# Patient Record
Sex: MALE | Race: WHITE | NOT HISPANIC OR LATINO | Employment: FULL TIME | URBAN - METROPOLITAN AREA
[De-identification: names, ages, dates, MRNs, and addresses within clinical notes are randomized per-mention and may not be internally consistent; named-entity substitution may affect disease eponyms.]

---

## 2018-04-05 ENCOUNTER — HOSPITAL ENCOUNTER (OUTPATIENT)
Facility: MEDICAL CENTER | Age: 23
End: 2018-04-05
Attending: PREVENTIVE MEDICINE
Payer: COMMERCIAL

## 2018-04-05 ENCOUNTER — EMPLOYEE HEALTH (OUTPATIENT)
Dept: OCCUPATIONAL MEDICINE | Facility: CLINIC | Age: 23
End: 2018-04-05

## 2018-04-05 ENCOUNTER — EH NON-PROVIDER (OUTPATIENT)
Dept: OCCUPATIONAL MEDICINE | Facility: CLINIC | Age: 23
End: 2018-04-05

## 2018-04-05 DIAGNOSIS — Z02.1 PRE-EMPLOYMENT DRUG SCREENING: ICD-10-CM

## 2018-04-05 DIAGNOSIS — Z02.1 PRE-EMPLOYMENT DRUG SCREENING: Primary | ICD-10-CM

## 2018-04-05 DIAGNOSIS — Z02.1 PRE-EMPLOYMENT EXAMINATION: ICD-10-CM

## 2018-04-05 DIAGNOSIS — Z02.89 VISIT FOR OCCUPATIONAL HEALTH EXAMINATION: ICD-10-CM

## 2018-04-05 LAB
AMP AMPHETAMINE: NORMAL
BAR BARBITURATES: NORMAL
BZO BENZODIAZEPINES: NORMAL
COC COCAINE: NORMAL
INT CON NEG: NORMAL
INT CON POS: NORMAL
MDMA ECSTASY: NORMAL
MET METHAMPHETAMINES: NORMAL
MTD METHADONE: NORMAL
OPI OPIATES: NORMAL
OXY OXYCODONE: NORMAL
PCP PHENCYCLIDINE: NORMAL
POC URINE DRUG SCREEN OCDRS: NEGATIVE
RUBV AB SER QL: 5.5 IU/ML
THC: NORMAL

## 2018-04-05 PROCEDURE — 86480 TB TEST CELL IMMUN MEASURE: CPT | Performed by: PREVENTIVE MEDICINE

## 2018-04-05 PROCEDURE — 8915 PR COMPREHENSIVE PHYSICAL: Performed by: PREVENTIVE MEDICINE

## 2018-04-05 PROCEDURE — 90715 TDAP VACCINE 7 YRS/> IM: CPT | Performed by: PREVENTIVE MEDICINE

## 2018-04-05 PROCEDURE — 94375 RESPIRATORY FLOW VOLUME LOOP: CPT | Performed by: PREVENTIVE MEDICINE

## 2018-04-05 PROCEDURE — 86735 MUMPS ANTIBODY: CPT | Performed by: PREVENTIVE MEDICINE

## 2018-04-05 PROCEDURE — 90716 VAR VACCINE LIVE SUBQ: CPT | Performed by: PREVENTIVE MEDICINE

## 2018-04-05 PROCEDURE — 86765 RUBEOLA ANTIBODY: CPT | Performed by: PREVENTIVE MEDICINE

## 2018-04-05 PROCEDURE — 90686 IIV4 VACC NO PRSV 0.5 ML IM: CPT | Performed by: PREVENTIVE MEDICINE

## 2018-04-05 PROCEDURE — 80305 DRUG TEST PRSMV DIR OPT OBS: CPT | Performed by: PREVENTIVE MEDICINE

## 2018-04-05 PROCEDURE — 86762 RUBELLA ANTIBODY: CPT | Performed by: PREVENTIVE MEDICINE

## 2018-04-06 LAB
MEV IGG SER IA-ACNC: NEGATIVE
MUV IGG SER IA-ACNC: POSITIVE

## 2018-04-09 LAB
M TB TUBERC IFN-G BLD QL: NEGATIVE
M TB TUBERC IFN-G/MITOGEN IGNF BLD: -0.02
M TB TUBERC IGNF/MITOGEN IGNF CONTROL: 52.23 [IU]/ML
MITOGEN IGNF BCKGRD COR BLD-ACNC: 0.06 [IU]/ML

## 2018-09-19 ENCOUNTER — DOCUMENTATION (OUTPATIENT)
Dept: OCCUPATIONAL MEDICINE | Facility: CLINIC | Age: 23
End: 2018-09-19

## 2018-09-20 NOTE — PROGRESS NOTES
Pre-employment medical surveillance reviewed. Rubella IgG AB result equivocal and Rubeola IgG AB result negative. MMR vaccine series required.   Employee to be contacted for appointment. MA informed. Quantiferon result documented in immunizations.  See scanned documents.     Clearance pending.

## 2018-09-22 ENCOUNTER — TELEPHONE (OUTPATIENT)
Dept: OCCUPATIONAL MEDICINE | Facility: CLINIC | Age: 23
End: 2018-09-22

## 2018-09-22 NOTE — TELEPHONE ENCOUNTER
Phone Number Called: [phone] (home)       Message: patient needs to get MMR vaccines.     Left Message for patient to call back: yes

## 2018-09-24 ENCOUNTER — IMMUNIZATION (OUTPATIENT)
Dept: OCCUPATIONAL MEDICINE | Facility: CLINIC | Age: 23
End: 2018-09-24
Payer: COMMERCIAL

## 2018-09-24 DIAGNOSIS — Z23 NEED FOR VACCINATION: ICD-10-CM

## 2018-09-24 PROCEDURE — 90686 IIV4 VACC NO PRSV 0.5 ML IM: CPT | Performed by: PREVENTIVE MEDICINE

## 2018-10-11 ENCOUNTER — HOSPITAL ENCOUNTER (EMERGENCY)
Facility: MEDICAL CENTER | Age: 23
End: 2018-10-11
Attending: EMERGENCY MEDICINE
Payer: COMMERCIAL

## 2018-10-11 ENCOUNTER — APPOINTMENT (OUTPATIENT)
Dept: RADIOLOGY | Facility: MEDICAL CENTER | Age: 23
End: 2018-10-11
Attending: EMERGENCY MEDICINE
Payer: COMMERCIAL

## 2018-10-11 VITALS
SYSTOLIC BLOOD PRESSURE: 110 MMHG | WEIGHT: 192 LBS | HEIGHT: 68 IN | TEMPERATURE: 97.7 F | HEART RATE: 83 BPM | BODY MASS INDEX: 29.1 KG/M2 | OXYGEN SATURATION: 98 % | RESPIRATION RATE: 17 BRPM | DIASTOLIC BLOOD PRESSURE: 79 MMHG

## 2018-10-11 DIAGNOSIS — N20.1 URETERAL STONE: ICD-10-CM

## 2018-10-11 LAB
ALBUMIN SERPL BCP-MCNC: 4.5 G/DL (ref 3.2–4.9)
ALBUMIN/GLOB SERPL: 1.7 G/DL
ALP SERPL-CCNC: 59 U/L (ref 30–99)
ALT SERPL-CCNC: 11 U/L (ref 2–50)
ANION GAP SERPL CALC-SCNC: 11 MMOL/L (ref 0–11.9)
APPEARANCE UR: ABNORMAL
AST SERPL-CCNC: 17 U/L (ref 12–45)
BACTERIA #/AREA URNS HPF: NEGATIVE /HPF
BASOPHILS # BLD AUTO: 0.2 % (ref 0–1.8)
BASOPHILS # BLD: 0.02 K/UL (ref 0–0.12)
BILIRUB SERPL-MCNC: 0.8 MG/DL (ref 0.1–1.5)
BILIRUB UR QL STRIP.AUTO: NEGATIVE
BUN SERPL-MCNC: 12 MG/DL (ref 8–22)
CALCIUM SERPL-MCNC: 9 MG/DL (ref 8.5–10.5)
CHLORIDE SERPL-SCNC: 107 MMOL/L (ref 96–112)
CO2 SERPL-SCNC: 20 MMOL/L (ref 20–33)
COLOR UR: ABNORMAL
CREAT SERPL-MCNC: 1.14 MG/DL (ref 0.5–1.4)
EOSINOPHIL # BLD AUTO: 0.03 K/UL (ref 0–0.51)
EOSINOPHIL NFR BLD: 0.3 % (ref 0–6.9)
EPI CELLS #/AREA URNS HPF: NEGATIVE /HPF
ERYTHROCYTE [DISTWIDTH] IN BLOOD BY AUTOMATED COUNT: 40 FL (ref 35.9–50)
GLOBULIN SER CALC-MCNC: 2.7 G/DL (ref 1.9–3.5)
GLUCOSE SERPL-MCNC: 128 MG/DL (ref 65–99)
GLUCOSE UR STRIP.AUTO-MCNC: NEGATIVE MG/DL
HCT VFR BLD AUTO: 48.5 % (ref 42–52)
HGB BLD-MCNC: 16.7 G/DL (ref 14–18)
HYALINE CASTS #/AREA URNS LPF: ABNORMAL /LPF
IMM GRANULOCYTES # BLD AUTO: 0.18 K/UL (ref 0–0.11)
IMM GRANULOCYTES NFR BLD AUTO: 1.9 % (ref 0–0.9)
KETONES UR STRIP.AUTO-MCNC: ABNORMAL MG/DL
LEUKOCYTE ESTERASE UR QL STRIP.AUTO: NEGATIVE
LIPASE SERPL-CCNC: 21 U/L (ref 11–82)
LYMPHOCYTES # BLD AUTO: 1.18 K/UL (ref 1–4.8)
LYMPHOCYTES NFR BLD: 12.7 % (ref 22–41)
MCH RBC QN AUTO: 30.6 PG (ref 27–33)
MCHC RBC AUTO-ENTMCNC: 34.4 G/DL (ref 33.7–35.3)
MCV RBC AUTO: 88.8 FL (ref 81.4–97.8)
MICRO URNS: ABNORMAL
MONOCYTES # BLD AUTO: 0.46 K/UL (ref 0–0.85)
MONOCYTES NFR BLD AUTO: 5 % (ref 0–13.4)
NEUTROPHILS # BLD AUTO: 7.39 K/UL (ref 1.82–7.42)
NEUTROPHILS NFR BLD: 79.9 % (ref 44–72)
NITRITE UR QL STRIP.AUTO: NEGATIVE
NRBC # BLD AUTO: 0 K/UL
NRBC BLD-RTO: 0 /100 WBC
PH UR STRIP.AUTO: 5 [PH]
PLATELET # BLD AUTO: 288 K/UL (ref 164–446)
PMV BLD AUTO: 10 FL (ref 9–12.9)
POTASSIUM SERPL-SCNC: 3.9 MMOL/L (ref 3.6–5.5)
PROT SERPL-MCNC: 7.2 G/DL (ref 6–8.2)
PROT UR QL STRIP: 30 MG/DL
RBC # BLD AUTO: 5.46 M/UL (ref 4.7–6.1)
RBC # URNS HPF: ABNORMAL /HPF
RBC UR QL AUTO: ABNORMAL
SODIUM SERPL-SCNC: 138 MMOL/L (ref 135–145)
SP GR UR STRIP.AUTO: 1.02
UROBILINOGEN UR STRIP.AUTO-MCNC: 0.2 MG/DL
WBC # BLD AUTO: 9.3 K/UL (ref 4.8–10.8)
WBC #/AREA URNS HPF: ABNORMAL /HPF

## 2018-10-11 PROCEDURE — 85025 COMPLETE CBC W/AUTO DIFF WBC: CPT

## 2018-10-11 PROCEDURE — 83690 ASSAY OF LIPASE: CPT

## 2018-10-11 PROCEDURE — 80053 COMPREHEN METABOLIC PANEL: CPT

## 2018-10-11 PROCEDURE — 99285 EMERGENCY DEPT VISIT HI MDM: CPT

## 2018-10-11 PROCEDURE — 700105 HCHG RX REV CODE 258: Performed by: EMERGENCY MEDICINE

## 2018-10-11 PROCEDURE — 96375 TX/PRO/DX INJ NEW DRUG ADDON: CPT

## 2018-10-11 PROCEDURE — 96374 THER/PROPH/DIAG INJ IV PUSH: CPT

## 2018-10-11 PROCEDURE — 700111 HCHG RX REV CODE 636 W/ 250 OVERRIDE (IP): Performed by: EMERGENCY MEDICINE

## 2018-10-11 PROCEDURE — 74176 CT ABD & PELVIS W/O CONTRAST: CPT

## 2018-10-11 PROCEDURE — 87086 URINE CULTURE/COLONY COUNT: CPT

## 2018-10-11 PROCEDURE — 81001 URINALYSIS AUTO W/SCOPE: CPT

## 2018-10-11 PROCEDURE — 36415 COLL VENOUS BLD VENIPUNCTURE: CPT

## 2018-10-11 RX ORDER — KETOROLAC TROMETHAMINE 30 MG/ML
30 INJECTION, SOLUTION INTRAMUSCULAR; INTRAVENOUS ONCE
Status: COMPLETED | OUTPATIENT
Start: 2018-10-11 | End: 2018-10-11

## 2018-10-11 RX ORDER — ONDANSETRON 2 MG/ML
4 INJECTION INTRAMUSCULAR; INTRAVENOUS ONCE
Status: COMPLETED | OUTPATIENT
Start: 2018-10-11 | End: 2018-10-11

## 2018-10-11 RX ORDER — SODIUM CHLORIDE, SODIUM LACTATE, POTASSIUM CHLORIDE, CALCIUM CHLORIDE 600; 310; 30; 20 MG/100ML; MG/100ML; MG/100ML; MG/100ML
1000 INJECTION, SOLUTION INTRAVENOUS ONCE
Status: COMPLETED | OUTPATIENT
Start: 2018-10-11 | End: 2018-10-11

## 2018-10-11 RX ORDER — HYDROCODONE BITARTRATE AND ACETAMINOPHEN 5; 325 MG/1; MG/1
1-2 TABLET ORAL EVERY 6 HOURS PRN
Qty: 15 TAB | Refills: 0 | Status: SHIPPED | OUTPATIENT
Start: 2018-10-11 | End: 2018-10-14

## 2018-10-11 RX ORDER — TAMSULOSIN HYDROCHLORIDE 0.4 MG/1
0.4 CAPSULE ORAL DAILY
Qty: 7 CAP | Refills: 0 | Status: SHIPPED | OUTPATIENT
Start: 2018-10-11 | End: 2020-05-14

## 2018-10-11 RX ORDER — ONDANSETRON 4 MG/1
4 TABLET, ORALLY DISINTEGRATING ORAL EVERY 6 HOURS PRN
Qty: 20 TAB | Refills: 0 | Status: SHIPPED | OUTPATIENT
Start: 2018-10-11 | End: 2020-05-14

## 2018-10-11 RX ADMIN — KETOROLAC TROMETHAMINE 30 MG: 30 INJECTION, SOLUTION INTRAMUSCULAR at 18:59

## 2018-10-11 RX ADMIN — SODIUM CHLORIDE, POTASSIUM CHLORIDE, SODIUM LACTATE AND CALCIUM CHLORIDE 1000 ML: 600; 310; 30; 20 INJECTION, SOLUTION INTRAVENOUS at 18:59

## 2018-10-11 RX ADMIN — ONDANSETRON 4 MG: 2 INJECTION INTRAMUSCULAR; INTRAVENOUS at 18:59

## 2018-10-11 ASSESSMENT — PAIN SCALES - GENERAL
PAINLEVEL_OUTOF10: 7
PAINLEVEL_OUTOF10: 7

## 2018-10-11 ASSESSMENT — PAIN DESCRIPTION - DESCRIPTORS: DESCRIPTORS: SHARP

## 2018-10-12 NOTE — ED PROVIDER NOTES
ED Provider Note      ER PROVIDER NOTE        CHIEF COMPLAINT  Chief Complaint   Patient presents with   • Flank Pain     Pt BIB EMS/ symptoms since 1500 today after waking/ hx of kidney stone/ ODT Zofran PTA   • Back Pain   • Nausea       HPI  Jayden Nielsen is a 23 y.o. male who presents to the emergency department complaining of right-sided flank pain.  Patient reports that today around 3 when he woke up he had acute onset of right flank pain, colicky, sharp, some radiation to the front, feels like his past kidney stone.  He denies any other radiation, no alleviating or aggravated factors.  He denies any dysuria or hematuria.  He has had some nausea and dry heaves from the pain as well.  He denies any other abdominal pain    REVIEW OF SYSTEMS  Pertinent positives include flank pain. Pertinent negatives include no fever or chills. See HPI for details. All other systems reviewed and are negative.    PAST MEDICAL HISTORY   has a past medical history of Kidney stone and Kidney stone.    SURGICAL HISTORY  patient denies any surgical history    FAMILY HISTORY  History reviewed. No pertinent family history.    SOCIAL HISTORY  Social History     Social History   • Marital status: Single     Spouse name: N/A   • Number of children: N/A   • Years of education: N/A     Social History Main Topics   • Smoking status: Never Smoker   • Smokeless tobacco: Never Used   • Alcohol use No   • Drug use: No   • Sexual activity: Not on file     Other Topics Concern   • Not on file     Social History Narrative   • No narrative on file      History   Drug Use No       CURRENT MEDICATIONS  Home Medications     Reviewed by Lisette Islas R.N. (Registered Nurse) on 10/11/18 at 1835  Med List Status: Complete   Medication Last Dose Status        Patient Alfonso Taking any Medications                       ALLERGIES  No Known Allergies    PHYSICAL EXAM  VITAL SIGNS: /79   Pulse 83   Temp 36.5 °C (97.7 °F)   Resp 17   Ht 1.727 m  "(5' 8\")   Wt 87.1 kg (192 lb)   SpO2 98%   BMI 29.19 kg/m²   Pulse ox interpretation: I interpret this pulse ox as normal.    Constitutional: Alert uncomfortable  HENT: No signs of trauma, Bilateral external ears normal, Nose normal.  Mucous membranes dry  Eyes: Pupils are equal and reactive, Conjunctiva normal, Non-icteric.   Neck: Normal range of motion, No tenderness, Supple, No stridor.   Lymphatic: No lymphadenopathy noted.   Cardiovascular: Regular rate and rhythm, no murmurs.   Thorax & Lungs: Normal breath sounds, No respiratory distress, No wheezing, No chest tenderness.   Abdomen: Bowel sounds normal, Soft, No tenderness, No masses, No pulsatile masses. No peritoneal signs.  Skin: Warm, Dry, No erythema, No rash.   Back: No bony tenderness, No CVA tenderness.   Extremities: Intact distal pulses, No edema, No tenderness, No cyanosis, Musculoskeletal: Good range of motion in all major joints. No tenderness to palpation or major deformities noted.   Neurologic: Alert , Normal motor function, Normal sensory function, No focal deficits noted.   Psychiatric: Affect normal, Judgment normal, Mood normal.     DIAGNOSTIC STUDIES / PROCEDURES        LABS  Labs Reviewed   CBC WITH DIFFERENTIAL - Abnormal; Notable for the following:        Result Value    Neutrophils-Polys 79.90 (*)     Lymphocytes 12.70 (*)     Immature Granulocytes 1.90 (*)     Immature Granulocytes (abs) 0.18 (*)     All other components within normal limits   COMP METABOLIC PANEL - Abnormal; Notable for the following:     Glucose 128 (*)     All other components within normal limits   URINALYSIS,CULTURE IF INDICATED - Abnormal; Notable for the following:     Character Cloudy (*)     Ketones Trace (*)     Protein 30 (*)     Occult Blood Large (*)     All other components within normal limits   URINE MICROSCOPIC (W/UA) - Abnormal; Notable for the following:     WBC 10-20 (*)     RBC  (*)     Hyaline Cast 6-10 (*)     All other components " within normal limits   LIPASE   URINE CULTURE(NEW)   ESTIMATED GFR       All labs reviewed by me.    RADIOLOGY  CT-RENAL COLIC EVALUATION(A/P W/O)   Final Result      1.  3 mm right-sided ureteral calculus at the ureterovesicular junction causing mild right-sided hydronephrosis and hydroureter.      2.  Bilateral nephrolithiasis.        The radiologist's interpretation of all radiological studies have been reviewed by me.    COURSE & MEDICAL DECISION MAKING  Nursing notes, VS, PMSFHx reviewed in chart.    6:39 PM Patient seen and examined at bedside. Patient will be treated with IV fluids, ketorolac, Zofran. Ordered for labs, CT to evaluate his symptoms.     HYDRATION: Based on the patient's presentation of Acute Vomiting and Dehydration the patient was given IV fluids. IV Hydration was used becasue oral hydration failed due to Patient not tolerating p.o.. Upon recheck following hydration, the patient was improved.     8:10 PM  Patient reevaluated, he reports pain is much improved, no CT, pending labs    8:55 PM  Patient reevaluated again, he states he is still feeling much better, updated on results we will plan for discharge      Decision Making:  This is a 23 y.o. male presenting with flank pain.  He does have a ureteral stone, no evidence of complication, significant obstruction or infectious process at this time.  He does have some white cells in his urine although no infectious symptoms leukoesterase nitrite either. Will trial conservative management as outpt with strict precautions to return if signs of obstruction or infection or complication occur.  I have prescribed Flomax, Zofran, Norco although advised him to start with ibuprofen  Also considered pyelonephritis, AAA, or intraabdominal pathology such as colitis, diverticulitis, appy but less likely given no abd pain or tenderness, non-toxic, urine results, young age suggest against AAA.    I discussed strict return precautions as well as instructed  patient on results and follow up which patient understood well and agreed with.          I reviewed prescription monitoring program for patient's narcotic use before prescribing a scheduled drug.The patient will not drink alcohol nor drive with prescribed medications. The patient will return for new or worsening symptoms and is stable at the time of discharge.    The patient is referred to a primary physician for blood pressure management, diabetic screening, and for all other preventative health concerns.    In prescribing controlled substances to this patient, I certify that I have obtained and reviewed the medical history of Jayden Nielsen. I have also made a good nader effort to obtain applicable records from other providers who have treated the patient and records did not demonstrate any increased risk of substance abuse that would prevent me from prescribing controlled substances.     I have conducted a physical exam and documented it. I have reviewed Mr. Nielsen’s prescription history as maintained by the [place] Prescription Monitoring Program.     I have assessed the patient’s risk for abuse, dependency, and addiction using the validated Opioid Risk Tool available at https://www.mdcalc.com/dwfyfr-yace-uukx-ort-narcotic-abuse.     Given the above, I believe the benefits of controlled substance therapy outweigh the risks. The reasons for prescribing controlled substances include non-narcotic, oral analgesic alternatives have been inadequate for pain control. Accordingly, I have discussed the risk and benefits, treatment plan, and alternative therapies with the patient.         DISPOSITION:  Patient will be discharged home in stable condition.    FOLLOW UP:  Ney Burton M.D.  [address] [facility] [place] [address]  [phone]    In 1 week        OUTPATIENT MEDICATIONS:  New Prescriptions    HYDROCODONE-ACETAMINOPHEN (NORCO) 5-325 MG TAB PER TABLET    Take 1-2 Tabs by mouth every 6 hours as needed (pain) for up  to 3 days.    ONDANSETRON (ZOFRAN ODT) 4 MG TABLET DISPERSIBLE    Take 1 Tab by mouth every 6 hours as needed for Nausea.    TAMSULOSIN (FLOMAX) 0.4 MG CAPSULE    Take 1 Cap by mouth every day.         FINAL IMPRESSION  1. Ureteral stone Acute        The note accurately reflects work and decisions made by me.  Gerry Marks  10/11/2018  9:14 PM

## 2018-10-12 NOTE — ED NOTES
Pt now resting on bed, respirations even/unlabored, skin warm/dry, NAD noted. In addition to triage note, pt states he is having waves of pain to R flank very similar to previous kidney stone. States he has not urinated since he awoke at 1545 this afternoon, but he relates this to not drinking anything. Reports vomiting ~300cc clear emesis. Endorses chills.

## 2018-10-12 NOTE — ED NOTES
Patient discharged home to self care, provided with paper copy of discharge instructions. Discussed discharge instructions and follow up appointments, patient verbalizes understanding. IV removed, bandage placed, Vital signs stable, escorted out to ED lobby.

## 2018-10-12 NOTE — ED TRIAGE NOTES
Chief Complaint   Patient presents with   • Flank Pain     Pt BIB EMS/ symptoms since 1500 today after waking/ hx of kidney stone/ ODT Zofran PTA   • Back Pain   • Nausea     Explained to pt triage process, made pt aware to tell this RN/staff of any changes/concerns, pt verbalized understanding of process and instructions given. Pt to ER lobby.

## 2018-10-13 LAB
BACTERIA UR CULT: NORMAL
SIGNIFICANT IND 70042: NORMAL
SITE SITE: NORMAL
SOURCE SOURCE: NORMAL

## 2018-12-18 ENCOUNTER — NON-PROVIDER VISIT (OUTPATIENT)
Dept: OCCUPATIONAL MEDICINE | Facility: CLINIC | Age: 23
End: 2018-12-18

## 2019-01-29 ENCOUNTER — EH NON-PROVIDER (OUTPATIENT)
Dept: OCCUPATIONAL MEDICINE | Facility: CLINIC | Age: 24
End: 2019-01-29

## 2019-01-29 DIAGNOSIS — Z23 NEED FOR VACCINATION: ICD-10-CM

## 2019-01-29 PROCEDURE — 90707 MMR VACCINE SC: CPT | Performed by: PREVENTIVE MEDICINE

## 2019-03-01 ENCOUNTER — EH NON-PROVIDER (OUTPATIENT)
Dept: OCCUPATIONAL MEDICINE | Facility: CLINIC | Age: 24
End: 2019-03-01

## 2019-03-01 DIAGNOSIS — Z71.85 IMMUNIZATION COUNSELING: ICD-10-CM

## 2019-09-30 ENCOUNTER — IMMUNIZATION (OUTPATIENT)
Dept: OCCUPATIONAL MEDICINE | Facility: CLINIC | Age: 24
End: 2019-09-30

## 2019-09-30 DIAGNOSIS — Z23 NEED FOR VACCINATION: ICD-10-CM

## 2019-09-30 PROCEDURE — 90686 IIV4 VACC NO PRSV 0.5 ML IM: CPT | Performed by: PREVENTIVE MEDICINE

## 2020-02-17 ENCOUNTER — OFFICE VISIT (OUTPATIENT)
Dept: URGENT CARE | Facility: CLINIC | Age: 25
End: 2020-02-17
Payer: COMMERCIAL

## 2020-02-17 VITALS
OXYGEN SATURATION: 96 % | WEIGHT: 209 LBS | TEMPERATURE: 98.1 F | HEIGHT: 69 IN | DIASTOLIC BLOOD PRESSURE: 72 MMHG | HEART RATE: 81 BPM | SYSTOLIC BLOOD PRESSURE: 126 MMHG | BODY MASS INDEX: 30.96 KG/M2 | RESPIRATION RATE: 16 BRPM

## 2020-02-17 DIAGNOSIS — J01.10 ACUTE FRONTAL SINUSITIS, RECURRENCE NOT SPECIFIED: ICD-10-CM

## 2020-02-17 DIAGNOSIS — R53.83 OTHER FATIGUE: ICD-10-CM

## 2020-02-17 DIAGNOSIS — M79.10 MYALGIA: ICD-10-CM

## 2020-02-17 PROCEDURE — 99203 OFFICE O/P NEW LOW 30 MIN: CPT | Performed by: NURSE PRACTITIONER

## 2020-02-17 RX ORDER — DOXYCYCLINE HYCLATE 100 MG
100 TABLET ORAL 2 TIMES DAILY
Qty: 14 TAB | Refills: 0 | Status: SHIPPED | OUTPATIENT
Start: 2020-02-17 | End: 2020-02-24

## 2020-02-17 ASSESSMENT — ENCOUNTER SYMPTOMS
FEVER: 0
CHILLS: 0
SINUS PAIN: 1
SINUS PRESSURE: 1

## 2020-02-17 NOTE — PROGRESS NOTES
Subjective:      Jayden Nielsen is a 25 y.o. male who presents with Sinus Problem (off/on x 1 year.  Now with Muscle pain, fatigue, sinus pain, some cough and a little bit of sore-throat x 4 days)    Past Medical History:   Diagnosis Date   • Kidney stone    • Kidney stone      Social History     Socioeconomic History   • Marital status: Single     Spouse name: Not on file   • Number of children: Not on file   • Years of education: Not on file   • Highest education level: Not on file   Occupational History   • Not on file   Social Needs   • Financial resource strain: Not on file   • Food insecurity     Worry: Not on file     Inability: Not on file   • Transportation needs     Medical: Not on file     Non-medical: Not on file   Tobacco Use   • Smoking status: Never Smoker   • Smokeless tobacco: Never Used   Substance and Sexual Activity   • Alcohol use: Yes     Comment: socially   • Drug use: No   • Sexual activity: Not on file   Lifestyle   • Physical activity     Days per week: Not on file     Minutes per session: Not on file   • Stress: Not on file   Relationships   • Social connections     Talks on phone: Not on file     Gets together: Not on file     Attends [employer] service: Not on file     Active member of club or organization: Not on file     Attends meetings of clubs or organizations: Not on file     Relationship status: Not on file   • Intimate partner violence     Fear of current or ex partner: Not on file     Emotionally abused: Not on file     Physically abused: Not on file     Forced sexual activity: Not on file   Other Topics Concern   • Not on file   Social History Narrative   • Not on file     History reviewed. No pertinent family history.    Allergies: Patient has no known allergies.    This patient is a 25-year-old male who presents today with complaint of frontal sinus pain and thick yellow postnasal drainage.  States he has had 3-4 episodes of sinus infections over the last year and is  "concerned about this.  Has had some fatigue and body aches with chills as well.  No shortness of breath or difficulty breathing.          Sinus Problem   This is a chronic problem. The problem is unchanged. There has been no fever. Associated symptoms include congestion and sinus pressure. Pertinent negatives include no chills. Past treatments include nothing. The treatment provided no relief.       Review of Systems   Constitutional: Positive for malaise/fatigue. Negative for chills and fever.   HENT: Positive for congestion, nosebleeds, sinus pressure and sinus pain.    Skin: Negative.    All other systems reviewed and are negative.         Objective:     /72 (BP Location: Left arm, Patient Position: Sitting, BP Cuff Size: Adult)   Pulse 81   Temp 36.7 °C (98.1 °F) (Temporal)   Resp 16   Ht 1.74 m (5' 8.5\")   Wt 94.8 kg (209 lb)   SpO2 96%   BMI 31.32 kg/m²      Physical Exam  Vitals signs reviewed.   Constitutional:       Appearance: Normal appearance.   HENT:      Head: Normocephalic.      Right Ear: Tympanic membrane, ear canal and external ear normal.      Left Ear: Tympanic membrane, ear canal and external ear normal.      Nose: Congestion present.      Comments: Tender over the frontal sinuses, thick yellow postnasal drainage noted.     Mouth/Throat:      Mouth: Mucous membranes are moist.   Eyes:      Extraocular Movements: Extraocular movements intact.      Conjunctiva/sclera: Conjunctivae normal.      Pupils: Pupils are equal, round, and reactive to light.   Neck:      Musculoskeletal: Normal range of motion and neck supple.   Cardiovascular:      Rate and Rhythm: Normal rate and regular rhythm.      Heart sounds: Normal heart sounds.   Pulmonary:      Effort: Pulmonary effort is normal.      Breath sounds: Normal breath sounds.   Musculoskeletal: Normal range of motion.   Skin:     General: Skin is warm and dry.      Capillary Refill: Capillary refill takes less than 2 seconds. "   Neurological:      Mental Status: He is alert and oriented to person, place, and time.   Psychiatric:         Mood and Affect: Mood normal.         Behavior: Behavior normal.         Thought Content: Thought content normal.         Judgment: Judgment normal.                 Assessment/Plan:     1. Other fatigue  2. Myalgia  3. Acute frontal sinusitis    Doxycycline  Referral given to ENT for follow-up  Humidifier  Nasal saline  Flonase as needed  Follow-up for persistent or worsening of symptoms  Note given for work

## 2020-02-17 NOTE — LETTER
February 17, 2020         Patient: Jayden Nielsen   YOB: 1995   Date of Visit: 2/17/2020           To Whom it May Concern:    Jayden Nielsen was seen in my clinic on 2/17/2020. He may return to work on 02/19/2020.    If you have any questions or concerns, please don't hesitate to call.        Sincerely,           Cathey J Hamman, A.P.N.  Electronically Signed

## 2020-04-22 ENCOUNTER — EH NON-PROVIDER (OUTPATIENT)
Dept: OCCUPATIONAL MEDICINE | Facility: CLINIC | Age: 25
End: 2020-04-22

## 2020-04-22 DIAGNOSIS — Z02.89 ENCOUNTER FOR OCCUPATIONAL HEALTH EXAMINATION INVOLVING RESPIRATOR: ICD-10-CM

## 2020-05-14 ENCOUNTER — TELEMEDICINE (OUTPATIENT)
Dept: MEDICAL GROUP | Facility: LAB | Age: 25
End: 2020-05-14
Payer: COMMERCIAL

## 2020-05-14 ENCOUNTER — TELEPHONE (OUTPATIENT)
Dept: SCHEDULING | Facility: IMAGING CENTER | Age: 25
End: 2020-05-14

## 2020-05-14 VITALS — WEIGHT: 204.37 LBS | HEIGHT: 69 IN | TEMPERATURE: 97.7 F | HEART RATE: 90 BPM | BODY MASS INDEX: 30.27 KG/M2

## 2020-05-14 DIAGNOSIS — R53.83 FATIGUE, UNSPECIFIED TYPE: ICD-10-CM

## 2020-05-14 PROCEDURE — 99214 OFFICE O/P EST MOD 30 MIN: CPT | Performed by: FAMILY MEDICINE

## 2020-05-14 SDOH — HEALTH STABILITY: MENTAL HEALTH: HOW OFTEN DO YOU HAVE 6 OR MORE DRINKS ON ONE OCCASION?: NEVER

## 2020-05-14 SDOH — HEALTH STABILITY: MENTAL HEALTH: HOW MANY STANDARD DRINKS CONTAINING ALCOHOL DO YOU HAVE ON A TYPICAL DAY?: 1 OR 2

## 2020-05-14 SDOH — HEALTH STABILITY: MENTAL HEALTH: HOW OFTEN DO YOU HAVE A DRINK CONTAINING ALCOHOL?: MONTHLY OR LESS

## 2020-05-14 ASSESSMENT — ENCOUNTER SYMPTOMS
DIZZINESS: 0
CONSTIPATION: 0
DIARRHEA: 0
HEADACHES: 0
PALPITATIONS: 0
ABDOMINAL PAIN: 0
NAUSEA: 0
VOMITING: 0
CHILLS: 0
DIAPHORESIS: 0
WHEEZING: 0
WEIGHT LOSS: 0
SHORTNESS OF BREATH: 0
FEVER: 0

## 2020-05-14 ASSESSMENT — PATIENT HEALTH QUESTIONNAIRE - PHQ9: CLINICAL INTERPRETATION OF PHQ2 SCORE: 0

## 2020-05-14 ASSESSMENT — FIBROSIS 4 INDEX: FIB4 SCORE: 0.44

## 2020-05-14 NOTE — PROGRESS NOTES
Jayden Nielsen is a 25 y.o. male here for   Chief Complaint   Patient presents with   • Establish Care   • Fatigue     loss of motivation, muscle fatigue, pretty progessive 6 month.        HPI:  Jayden is a very pleasant 25 y.o. male.   -Onset: Over 6 months   -Muscle fatigue, malasie  -Lack of energy creating lack to motivation   -Increase in sinus infections (4 sinus infections over the past year)   -Getting 6-8 hours of sleep a night. Feels rested but muscle weakness prevails.   -occasional instances of SOB.   -Denies any feelings of depression/anxiety, no history of depression/anxiety   -continues to workout, aerobic exercise daily.   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       Current medicines (including changes today)  Current Outpatient Medications   Medication Sig Dispense Refill   • HYDROcodone-Acetaminophen (NORCO PO) Take  by mouth.     • ondansetron (ZOFRAN ODT) 4 MG TABLET DISPERSIBLE Take 1 Tab by mouth every 6 hours as needed for Nausea. 20 Tab 0   • tamsulosin (FLOMAX) 0.4 MG capsule Take 1 Cap by mouth every day. 7 Cap 0     No current facility-administered medications for this visit.      He  has a past medical history of Kidney stone and Kidney stone.  He  has no past surgical history on file.  Social History     Tobacco Use   • Smoking status: Never Smoker   • Smokeless tobacco: Never Used   Substance Use Topics   • Alcohol use: Yes     Frequency: Monthly or less     Drinks per session: 1 or 2     Binge frequency: Never     Comment: socially   • Drug use: No     Social History     Social History Narrative   • Not on file     No family history on file.  No family status information on file.         ROS  Review of Systems   Constitutional: Positive for malaise/fatigue. Negative for chills, diaphoresis, fever and weight loss.   Respiratory: Negative for shortness of breath and wheezing.    Cardiovascular: Negative for chest pain and palpitations.   Gastrointestinal: Negative for  "abdominal pain, constipation, diarrhea, nausea and vomiting.   Skin: Negative for rash.   Neurological: Negative for dizziness and headaches.   All other systems reviewed and are negative.     Objective:     Pulse 90   Temp 36.5 °C (97.7 °F) (Oral)   Ht 1.74 m (5' 8.5\")   Wt 92.7 kg (204 lb 5.9 oz)  Body mass index is 30.62 kg/m².  Physical Exam:    Constitutional: Alert, no distress.  Skin: Warm, dry, good turgor, no rashes in visible areas.  Eye: Equal, round and reactive, conjunctiva clear, lids normal.  ENMT: Lips without lesions, good dentition, oropharynx clear. TM's pearly gray with normal light reflexes bilaterally  Neck: Trachea midline, no masses, no thyromegaly. No cervical or supraclavicular lymphadenopathy.  Respiratory: Unlabored respiratory effort, lungs clear to auscultation bilaterally, no wheezes, rales, or ronchi.  Cardiovascular: Normal S1, S2, RRR, no murmur, no edema.  Abdomen: Soft, non-tender, no masses, no hepatosplenomegaly.  Psych: Alert and oriented x3, normal affect and mood.  ***      Assessment and Plan:   The following treatment plan was discussed    There are no diagnoses linked to this encounter.    Records requested.  Followup: No follow-ups on file.         This note was created using voice recognition software. I have made every reasonable attempt to correct errors, however, I do anticipate some grammatical errors.   "

## 2020-05-14 NOTE — PROGRESS NOTES
Telemedicine Visit: Established Patient     This encounter was conducted via Zoom .   Verbal consent was obtained. Patient's identity was verified.    Subjective:   CC:   Chief Complaint   Patient presents with   • Establish Care   • Fatigue     loss of motivation, muscle fatigue, pretty progessive 6 month.        Jayden Nielsen is a 25 y.o. male presenting for evaluation and management of:    #Fatigue   -Onset: Over 6 months   -Muscle fatigue, malasie  -Lack of energy creating lack to motivation   -Increase in sinus infections (4 sinus infections over the past year)   -Getting 6-8 hours of sleep a night. Feels rested but muscle weakness prevails.   -occasional instances of SOB.   -Denies any feelings of depression/anxiety, no history of depression/anxiety   -continues to workout, aerobic exercise daily.   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     ROS   Denies any recent fevers or chills. No nausea or vomiting. No chest pains or shortness of breath.     No Known Allergies    Current medicines (including changes today)  No current outpatient medications on file.     No current facility-administered medications for this visit.        Patient Active Problem List    Diagnosis Date Noted   • Ureteral stone 10/11/2018       History reviewed. No pertinent family history.    He  has a past medical history of Kidney stone and Kidney stone.  He  has no past surgical history on file.       Objective:   Vitals obtained by patient:  Chief Complaint   Patient presents with   • Establish Care   • Fatigue     loss of motivation, muscle fatigue, pretty progessive 6 month.      Physical Exam:  Constitutional: Alert, no distress, well-groomed.  Skin: No rashes in visible areas.  Eye: Round. Conjunctiva clear, lids normal. No icterus.   ENMT: Lips pink without lesions, good dentition, moist mucous membranes. Phonation normal.  Neck: No masses, no thyromegaly. Moves freely without pain.  CV: Pulse as reported by  patient  Respiratory: Unlabored respiratory effort, no cough or audible wheeze  Psych: Alert and oriented x3, normal affect and mood.       Assessment and Plan:   The following treatment plan was discussed:     1. Fatigue, unspecified type  -Unknown etiology of fatigue at this time.  Does not appear to be psychological as PHQ 9 was negative, no concerns of depression.  Is currently not sexually active and denies any symptoms of low libido.  Patient is concerned it could be a vitamin D deficiency.  Given unknown etiology we will order a panel of labs at this time as below.  Encourage patient continue to work on appropriate sleep schedule, good diet, daily aerobic activity.  Will call patient with results.  Return precautions given.  - Comp Metabolic Panel; Future  - CBC WITHOUT DIFFERENTIAL; Future  - TSH WITH REFLEX TO FT4; Future  - VITAMIN D,25 HYDROXY; Future  - TESTOSTERONE F&T MALE ADULT; Future    Follow-up: Return if symptoms worsen or fail to improve.

## 2020-09-22 ENCOUNTER — IMMUNIZATION (OUTPATIENT)
Dept: OCCUPATIONAL MEDICINE | Facility: CLINIC | Age: 25
End: 2020-09-22

## 2020-09-22 DIAGNOSIS — Z23 NEED FOR VACCINATION: ICD-10-CM

## 2020-09-22 PROCEDURE — 90686 IIV4 VACC NO PRSV 0.5 ML IM: CPT | Performed by: PREVENTIVE MEDICINE

## 2020-12-15 ENCOUNTER — HOSPITAL ENCOUNTER (OUTPATIENT)
Dept: LAB | Facility: MEDICAL CENTER | Age: 25
End: 2020-12-15
Attending: EMERGENCY MEDICINE
Payer: COMMERCIAL

## 2020-12-15 LAB
COVID ORDER STATUS COVID19: NORMAL
SARS-COV-2 RNA RESP QL NAA+PROBE: NOTDETECTED
SPECIMEN SOURCE: NORMAL

## 2020-12-16 DIAGNOSIS — Z23 NEED FOR VACCINATION: ICD-10-CM

## 2020-12-19 ENCOUNTER — OFFICE VISIT (OUTPATIENT)
Dept: URGENT CARE | Facility: CLINIC | Age: 25
End: 2020-12-19
Payer: COMMERCIAL

## 2020-12-19 ENCOUNTER — HOSPITAL ENCOUNTER (OUTPATIENT)
Facility: MEDICAL CENTER | Age: 25
End: 2020-12-19
Attending: FAMILY MEDICINE
Payer: COMMERCIAL

## 2020-12-19 VITALS
WEIGHT: 230 LBS | RESPIRATION RATE: 16 BRPM | SYSTOLIC BLOOD PRESSURE: 128 MMHG | DIASTOLIC BLOOD PRESSURE: 88 MMHG | BODY MASS INDEX: 34.86 KG/M2 | HEART RATE: 100 BPM | TEMPERATURE: 97.3 F | HEIGHT: 68 IN | OXYGEN SATURATION: 96 %

## 2020-12-19 DIAGNOSIS — R53.83 FATIGUE, UNSPECIFIED TYPE: ICD-10-CM

## 2020-12-19 DIAGNOSIS — Z20.822 SUSPECTED COVID-19 VIRUS INFECTION: ICD-10-CM

## 2020-12-19 DIAGNOSIS — R06.00 DYSPNEA, UNSPECIFIED TYPE: ICD-10-CM

## 2020-12-19 PROCEDURE — U0003 INFECTIOUS AGENT DETECTION BY NUCLEIC ACID (DNA OR RNA); SEVERE ACUTE RESPIRATORY SYNDROME CORONAVIRUS 2 (SARS-COV-2) (CORONAVIRUS DISEASE [COVID-19]), AMPLIFIED PROBE TECHNIQUE, MAKING USE OF HIGH THROUGHPUT TECHNOLOGIES AS DESCRIBED BY CMS-2020-01-R: HCPCS

## 2020-12-19 PROCEDURE — 99214 OFFICE O/P EST MOD 30 MIN: CPT | Mod: CS | Performed by: FAMILY MEDICINE

## 2020-12-19 ASSESSMENT — ENCOUNTER SYMPTOMS
VOMITING: 0
FEVER: 0
MYALGIAS: 0
CHILLS: 0
SORE THROAT: 0
SHORTNESS OF BREATH: 1
COUGH: 1
HEADACHES: 1
NAUSEA: 0
DIZZINESS: 0

## 2020-12-19 NOTE — PATIENT INSTRUCTIONS
INSTRUCTIONS FOR COVID-19 OR ANY OTHER INFECTIOUS RESPIRATORY ILLNESSES    The Centers for Disease Control and Prevention (CDC) states that early indications for COVID-19 include cough, shortness of breath, difficulty breathing, or at least two of the following symptoms: chills, shaking with chills, muscle pain, headache, sore throat, and loss of taste or smell. Symptoms can range from mild to severe and may appear up to two weeks after exposure to the virus.    The practice of self-isolation and quarantine helps protect the public and your family by  preventing exposure to people who have or may have a contagious disease. Please follow the prevention steps below as based on CDC guidelines:    WHEN TO STOP ISOLATION: Persons with COVID-19 or any other infectious respiratory illness who have symptoms and were advised to care for themselves at home may discontinue home isolation under the following conditions:  · At least 24 hours have passed since recovery defined as resolution of fever without the use of fever-reducing medications; AND,  · Improvement in respiratory symptoms (e.g., cough, shortness of breath); AND,  · At least 10 days have passed since symptoms first appeared and have had no subsequent illness.    MONITOR YOUR SYMPTOMS: If your illness is worsening, seek prompt medical attention. If you have a medical emergency and need to call 911, notify the dispatch personnel that you have, or are being evaluated for confirmed or suspected COVID-19 or another infectious respiratory illness. Wear a facemask if possible.    ACTIVITY RESTRICTION: restrict activities outside your home, except for getting medical care. Do not go to work, school, or public areas. Avoid using public transportation, ride-sharing, or taxis.    SCHEDULED MEDICAL APPOINTMENTS: Notify your provider that you have, or are being evaluated for, confirmed or suspected COVID-19 or another infectious respiratory. This will help the healthcare  provider’s office safely take care of you and keep other people from getting exposed or infected.    FACEMASKS, when to wear: Anytime you are away from your home or around other people or pets. If you are unable to wear one, maintain a minimum of 6 feet distancing from others.    LIVING ENVIRONMENT: Stay in a separate room from other people and pets. If possible, use a separate bathroom, have someone else care for your pets and avoid sharing household items. Any items used should be washed thoroughly with soap and water. Clean all “high-touch” surfaces every day. Use a household cleaning spray or wipe, according to the label instructions. High touch surfaces include (but are not limited to) counters, tabletops, doorknobs, bathroom fixtures, toilets, phones, keyboards, tablets, and bedside tables.     HAND WASHING: Frequently wash hands with soap and water for at least 20 seconds,  especially after blowing your nose, coughing, or sneezing; going to the bathroom; before and after interacting with pets; and before and after eating or preparing food. If hands are visibly dirty use soap and water. If soap and water are not available, use an alcohol-based hand  with at least 60% alcohol. Avoid touching your eyes, nose, and mouth with unwashed hands. Cover your coughs and sneezes with a tissue. Throw used tissues in a lined trash can. Immediately wash your hands.    ACTIVE/FACILITATED SELF-MONITORING: Follow instructions provided by your local health department or health professionals, as appropriate. When working with your local health department check their available hours.    [facility]   Phone Number   [facility] [phone]   [facility] [place] [phone]   [place] [address]   [place] [phone]     IF YOU HAVE CONFIRMED POSITIVE COVID-19:    Those who have completely recovered from COVID-19 may have immune-boosting antibodies in their plasma--called “convalescent plasma”--that could be  used to treat critically ill COVID19 patients.    [facility] is excited to begin working with [employer] on collecting convalescent plasma from  people who have recovered from COVID-19 as part of a program to treat patients infected with the virus. This FDA-approved “emergency investigational new drug” is a special blood product containing antibodies that may give patients an extra boost to fight the virus.    To be eligible to donate convalescent plasma, you must have a prior COVID-19 diagnosis documented by a laboratory test (or a positive test result for SARS-CoV-2 antibodies) and meet additional eligibility requirements.    If you are interested in donating convalescent plasma or have any additional questions, please contact the [facility] Convalescent Plasma  at [phone] or via e-mail at [facility] [email or web address].

## 2020-12-19 NOTE — PROGRESS NOTES
"Subjective:   Jayden Nielsen is a 25 y.o. male who presents for Cough (Sob fatigue headache x 1 week)        Cough  This is a new problem. The current episode started in the past 7 days. The problem has been unchanged. The cough is non-productive. Associated symptoms include headaches and shortness of breath. Pertinent negatives include no chest pain, chills, fever, myalgias, rash or sore throat. Associated symptoms comments: There has been community-wide COVID-19 exposure, the patient denies known direct COVID-19 exposure  Initial SARS COV2 by PCR was not detected 12/15/2020. Nothing aggravates the symptoms. Treatments tried: NyQuil, DayQuil, ibuprofen. The treatment provided mild relief.     PMH:  has a past medical history of Kidney stone and Kidney stone.  MEDS: No current outpatient medications on file.  ALLERGIES: No Known Allergies  SURGHX: No past surgical history on file.  SOCHX:  reports that he has never smoked. He has never used smokeless tobacco. He reports current alcohol use. He reports that he does not use drugs.  FH: No family history on file.  Review of Systems   Constitutional: Positive for malaise/fatigue. Negative for chills and fever.   HENT: Negative for sore throat.    Respiratory: Positive for cough and shortness of breath.    Cardiovascular: Negative for chest pain.   Gastrointestinal: Negative for nausea and vomiting.   Genitourinary: Negative for dysuria.   Musculoskeletal: Negative for myalgias.   Skin: Negative for rash.   Neurological: Positive for headaches. Negative for dizziness.        Objective:   /88 (BP Location: Left arm, Patient Position: Sitting, BP Cuff Size: Adult)   Pulse 100   Temp 36.3 °C (97.3 °F) (Temporal)   Resp 16   Ht 1.727 m (5' 8\")   Wt 104.3 kg (230 lb)   SpO2 96%   BMI 34.97 kg/m²   Physical Exam  Vitals signs and nursing note reviewed.   Constitutional:       General: He is not in acute distress.     Appearance: He is well-developed.   HENT: "      Head: Normocephalic and atraumatic.      Right Ear: External ear normal.      Left Ear: External ear normal.      Nose: Nose normal.      Mouth/Throat:      Mouth: Mucous membranes are moist.      Pharynx: No oropharyngeal exudate or posterior oropharyngeal erythema.   Eyes:      Conjunctiva/sclera: Conjunctivae normal.   Cardiovascular:      Rate and Rhythm: Normal rate.   Pulmonary:      Effort: Pulmonary effort is normal. No respiratory distress.      Breath sounds: Normal breath sounds. No wheezing or rhonchi.   Abdominal:      General: There is no distension.   Musculoskeletal: Normal range of motion.   Skin:     General: Skin is warm and dry.   Neurological:      General: No focal deficit present.      Mental Status: He is alert and oriented to person, place, and time. Mental status is at baseline.      Gait: Gait (gait at baseline) normal.   Psychiatric:         Judgment: Judgment normal.           Assessment/Plan:   1. Suspected COVID-19 virus infection  - COVID/SARS COV-2 PCR; Future    2. Dyspnea, unspecified type    3. Fatigue, unspecified type      Medical decision-making/course: The patient remained afebrile, hemodynamically and neurologically stable with no evidence of respiratory compromise throughout the urgent care course.  There was no immediate clinical indication for the necessity of emergency department evaluation or inpatient admission and the patient requested a trial of outpatient management.        Advised routine CDC social distancing guidelines, symptomatic and supportive measures      Discussed close monitoring, return precautions, and supportive measures of maintaining adequate fluid hydration and caloric intake, relative rest and symptom management as needed for pain and/or fever.    Differential diagnosis, natural history, supportive care, and indications for immediate follow-up discussed.     Advised the patient to follow-up with the primary care physician for recheck,  reevaluation, and consideration of further management.    Please note that this dictation was created using voice recognition software. I have worked with consultants from the vendor as well as technical experts from [facility] to optimize the interface. I have made every reasonable attempt to correct obvious errors, but I expect that there are errors of grammar and possibly content that I did not discover before finalizing the note.

## 2020-12-20 DIAGNOSIS — Z20.822 SUSPECTED COVID-19 VIRUS INFECTION: ICD-10-CM

## 2021-02-06 ENCOUNTER — HOSPITAL ENCOUNTER (EMERGENCY)
Facility: MEDICAL CENTER | Age: 26
End: 2021-02-06
Attending: EMERGENCY MEDICINE
Payer: COMMERCIAL

## 2021-02-06 ENCOUNTER — APPOINTMENT (OUTPATIENT)
Dept: RADIOLOGY | Facility: MEDICAL CENTER | Age: 26
End: 2021-02-06
Attending: EMERGENCY MEDICINE
Payer: COMMERCIAL

## 2021-02-06 VITALS
HEART RATE: 80 BPM | OXYGEN SATURATION: 99 % | TEMPERATURE: 98.3 F | RESPIRATION RATE: 16 BRPM | WEIGHT: 225.31 LBS | DIASTOLIC BLOOD PRESSURE: 88 MMHG | BODY MASS INDEX: 34.15 KG/M2 | SYSTOLIC BLOOD PRESSURE: 124 MMHG | HEIGHT: 68 IN

## 2021-02-06 DIAGNOSIS — R10.9 FLANK PAIN: ICD-10-CM

## 2021-02-06 DIAGNOSIS — N23 RENAL COLIC: ICD-10-CM

## 2021-02-06 DIAGNOSIS — N20.0 KIDNEY STONE: ICD-10-CM

## 2021-02-06 LAB
ANION GAP SERPL CALC-SCNC: 11 MMOL/L (ref 7–16)
APPEARANCE UR: CLEAR
BACTERIA #/AREA URNS HPF: NEGATIVE /HPF
BASOPHILS # BLD AUTO: 0.6 % (ref 0–1.8)
BASOPHILS # BLD: 0.03 K/UL (ref 0–0.12)
BILIRUB UR QL STRIP.AUTO: NEGATIVE
BUN SERPL-MCNC: 12 MG/DL (ref 8–22)
CALCIUM SERPL-MCNC: 8.9 MG/DL (ref 8.5–10.5)
CHLORIDE SERPL-SCNC: 102 MMOL/L (ref 96–112)
CO2 SERPL-SCNC: 21 MMOL/L (ref 20–33)
COLOR UR: YELLOW
CREAT SERPL-MCNC: 0.99 MG/DL (ref 0.5–1.4)
EOSINOPHIL # BLD AUTO: 0.03 K/UL (ref 0–0.51)
EOSINOPHIL NFR BLD: 0.6 % (ref 0–6.9)
EPI CELLS #/AREA URNS HPF: ABNORMAL /HPF
ERYTHROCYTE [DISTWIDTH] IN BLOOD BY AUTOMATED COUNT: 40.8 FL (ref 35.9–50)
GLUCOSE SERPL-MCNC: 108 MG/DL (ref 65–99)
GLUCOSE UR STRIP.AUTO-MCNC: NEGATIVE MG/DL
HCT VFR BLD AUTO: 48.9 % (ref 42–52)
HGB BLD-MCNC: 16.6 G/DL (ref 14–18)
HYALINE CASTS #/AREA URNS LPF: ABNORMAL /LPF
IMM GRANULOCYTES # BLD AUTO: 0.02 K/UL (ref 0–0.11)
IMM GRANULOCYTES NFR BLD AUTO: 0.4 % (ref 0–0.9)
KETONES UR STRIP.AUTO-MCNC: NEGATIVE MG/DL
LEUKOCYTE ESTERASE UR QL STRIP.AUTO: NEGATIVE
LYMPHOCYTES # BLD AUTO: 1.43 K/UL (ref 1–4.8)
LYMPHOCYTES NFR BLD: 27.3 % (ref 22–41)
MCH RBC QN AUTO: 30.5 PG (ref 27–33)
MCHC RBC AUTO-ENTMCNC: 33.9 G/DL (ref 33.7–35.3)
MCV RBC AUTO: 89.7 FL (ref 81.4–97.8)
MICRO URNS: ABNORMAL
MONOCYTES # BLD AUTO: 0.32 K/UL (ref 0–0.85)
MONOCYTES NFR BLD AUTO: 6.1 % (ref 0–13.4)
NEUTROPHILS # BLD AUTO: 3.4 K/UL (ref 1.82–7.42)
NEUTROPHILS NFR BLD: 65 % (ref 44–72)
NITRITE UR QL STRIP.AUTO: NEGATIVE
NRBC # BLD AUTO: 0 K/UL
NRBC BLD-RTO: 0 /100 WBC
PH UR STRIP.AUTO: 6.5 [PH] (ref 5–8)
PLATELET # BLD AUTO: 313 K/UL (ref 164–446)
PMV BLD AUTO: 9.4 FL (ref 9–12.9)
POTASSIUM SERPL-SCNC: 3.8 MMOL/L (ref 3.6–5.5)
PROT UR QL STRIP: 30 MG/DL
RBC # BLD AUTO: 5.45 M/UL (ref 4.7–6.1)
RBC # URNS HPF: ABNORMAL /HPF
RBC UR QL AUTO: ABNORMAL
SODIUM SERPL-SCNC: 134 MMOL/L (ref 135–145)
SP GR UR STRIP.AUTO: 1.02
UROBILINOGEN UR STRIP.AUTO-MCNC: 1 MG/DL
WBC # BLD AUTO: 5.2 K/UL (ref 4.8–10.8)
WBC #/AREA URNS HPF: ABNORMAL /HPF

## 2021-02-06 PROCEDURE — 36415 COLL VENOUS BLD VENIPUNCTURE: CPT

## 2021-02-06 PROCEDURE — 80048 BASIC METABOLIC PNL TOTAL CA: CPT

## 2021-02-06 PROCEDURE — 81001 URINALYSIS AUTO W/SCOPE: CPT

## 2021-02-06 PROCEDURE — 700111 HCHG RX REV CODE 636 W/ 250 OVERRIDE (IP): Performed by: EMERGENCY MEDICINE

## 2021-02-06 PROCEDURE — 76775 US EXAM ABDO BACK WALL LIM: CPT

## 2021-02-06 PROCEDURE — 96374 THER/PROPH/DIAG INJ IV PUSH: CPT

## 2021-02-06 PROCEDURE — 85025 COMPLETE CBC W/AUTO DIFF WBC: CPT

## 2021-02-06 PROCEDURE — 99284 EMERGENCY DEPT VISIT MOD MDM: CPT

## 2021-02-06 RX ORDER — ONDANSETRON 4 MG/1
4 TABLET, ORALLY DISINTEGRATING ORAL EVERY 8 HOURS PRN
Qty: 10 TAB | Refills: 0 | Status: SHIPPED | OUTPATIENT
Start: 2021-02-06 | End: 2023-07-30

## 2021-02-06 RX ORDER — KETOROLAC TROMETHAMINE 30 MG/ML
15 INJECTION, SOLUTION INTRAMUSCULAR; INTRAVENOUS ONCE
Status: COMPLETED | OUTPATIENT
Start: 2021-02-06 | End: 2021-02-06

## 2021-02-06 RX ADMIN — KETOROLAC TROMETHAMINE 15 MG: 30 INJECTION, SOLUTION INTRAMUSCULAR at 20:44

## 2021-02-06 ASSESSMENT — PAIN DESCRIPTION - PAIN TYPE
TYPE: ACUTE PAIN
TYPE: ACUTE PAIN

## 2021-02-06 NOTE — Clinical Note
Jayden Nielsen was seen and treated in our emergency department on 2/6/2021.  He may return to work on 02/07/2021.  Please excuse from work today.      If you have any questions or concerns, please don't hesitate to call.      Jose Carlos Vera M.D.

## 2021-02-07 NOTE — ED PROVIDER NOTES
ED Provider Note    Scribed for Jose Carlos Vera M.D. by Minnie Rodriguez. 2/6/2021, 8:12 PM.    Primary care provider: Arik Martins M.D.  Means of arrival: Walk-in  History obtained from: Patient   History limited by: None    CHIEF COMPLAINT  Chief Complaint   Patient presents with   • Flank Pain     sharp pain left flank since 1810. Denies dysuria. + nausea, vomited x 1. Hx of kidney stones.      PPE Note: I personally donned full PPE for all patient encounters during this visit, including wearing an N95 respirator mask, gloves, and eye protection. Scribe remained outside the patient's room and did not have any contact with the patient for the duration of patient encounter.      AYALA Nielsen is a 26 y.o. male who presents to the Emergency Department for left flank pain sudden onset 6:00 PM. His pain is localized and is non radiating. He states he has had three kidney stones in the past and he says the pain is the same. He describes the pain as 6/10 in severity at the moment. He says it was a 10/10 in the waiting room. His last kidney stone was in March 2019. He was seen in the ER here. He had one episode of emesis in the waiting room and he is experiencing dysuria. He denies testicular pain, fever, cough, and chest pain. He works as a nurse here at [employer]. He says he visualized a stone in the urine sample he provided.     REVIEW OF SYSTEMS  Pertinent positives include flank pain, vomiting. Pertinent negatives include testicular pain, fever, cough, and chest pain. All other systems negative.    PAST MEDICAL HISTORY   has a past medical history of Kidney stone and Kidney stone.    SURGICAL HISTORY  patient denies any surgical history    SOCIAL HISTORY  Social History     Tobacco Use   • Smoking status: Never Smoker   • Smokeless tobacco: Never Used   Substance Use Topics   • Alcohol use: Yes     Frequency: Monthly or less     Drinks per session: 1 or 2     Binge frequency: Never     " Comment: socially   • Drug use: No      Social History     Substance and Sexual Activity   Drug Use No       FAMILY HISTORY  History reviewed. No pertinent family history.    CURRENT MEDICATIONS  No current outpatient medications    ALLERGIES  No Known Allergies    PHYSICAL EXAM  VITAL SIGNS: /94   Pulse 86   Temp 36.3 °C (97.4 °F) (Temporal)   Resp 16   Ht 1.727 m (5' 8\")   Wt 102 kg (225 lb 5 oz)   SpO2 99%   BMI 34.26 kg/m²     Constitutional: Well developed, Well nourished, no acute distress.   Eyes: Conjunctiva normal, No discharge.   Cardiovascular: Normal heart rate, Normal rhythm, No murmurs, equal pulses.   Pulmonary: Normal breath sounds, No respiratory distress, No wheezing, No rales, No rhonchi.  Abdomen:Soft, No tenderness, No masses, no rebound, no guarding.   Back: Left sided CVA tenderness  Musculoskeletal: No major deformities noted, No tenderness.   Neurologic: Alert & oriented x 3, Normal motor function,  No focal deficits noted.   Psychiatric: Affect normal, Judgment normal, Mood normal.     LABS  Results for orders placed or performed during the hospital encounter of 02/06/21   URINALYSIS (UA)    Specimen: Urine   Result Value Ref Range    Color Yellow     Character Clear     Specific Gravity 1.023 <1.035    Ph 6.5 5.0 - 8.0    Glucose Negative Negative mg/dL    Ketones Negative Negative mg/dL    Protein 30 (A) Negative mg/dL    Bilirubin Negative Negative    Urobilinogen, Urine 1.0 Negative    Nitrite Negative Negative    Leukocyte Esterase Negative Negative    Occult Blood Large (A) Negative    Micro Urine Req Microscopic    CBC WITH DIFFERENTIAL   Result Value Ref Range    WBC 5.2 4.8 - 10.8 K/uL    RBC 5.45 4.70 - 6.10 M/uL    Hemoglobin 16.6 14.0 - 18.0 g/dL    Hematocrit 48.9 42.0 - 52.0 %    MCV 89.7 81.4 - 97.8 fL    MCH 30.5 27.0 - 33.0 pg    MCHC 33.9 33.7 - 35.3 g/dL    RDW 40.8 35.9 - 50.0 fL    Platelet Count 313 164 - 446 K/uL    MPV 9.4 9.0 - 12.9 fL    " Neutrophils-Polys 65.00 44.00 - 72.00 %    Lymphocytes 27.30 22.00 - 41.00 %    Monocytes 6.10 0.00 - 13.40 %    Eosinophils 0.60 0.00 - 6.90 %    Basophils 0.60 0.00 - 1.80 %    Immature Granulocytes 0.40 0.00 - 0.90 %    Nucleated RBC 0.00 /100 WBC    Neutrophils (Absolute) 3.40 1.82 - 7.42 K/uL    Lymphs (Absolute) 1.43 1.00 - 4.80 K/uL    Monos (Absolute) 0.32 0.00 - 0.85 K/uL    Eos (Absolute) 0.03 0.00 - 0.51 K/uL    Baso (Absolute) 0.03 0.00 - 0.12 K/uL    Immature Granulocytes (abs) 0.02 0.00 - 0.11 K/uL    NRBC (Absolute) 0.00 K/uL   BASIC METABOLIC PANEL   Result Value Ref Range    Sodium 134 (L) 135 - 145 mmol/L    Potassium 3.8 3.6 - 5.5 mmol/L    Chloride 102 96 - 112 mmol/L    Co2 21 20 - 33 mmol/L    Glucose 108 (H) 65 - 99 mg/dL    Bun 12 8 - 22 mg/dL    Creatinine 0.99 0.50 - 1.40 mg/dL    Calcium 8.9 8.5 - 10.5 mg/dL    Anion Gap 11.0 7.0 - 16.0   URINE MICROSCOPIC (W/UA)   Result Value Ref Range    WBC 5-10 (A) /hpf    RBC  (A) /hpf    Bacteria Negative None /hpf    Epithelial Cells Few /hpf    Hyaline Cast 6-10 (A) /lpf   ESTIMATED GFR   Result Value Ref Range    GFR If African American >60 >60 mL/min/1.73 m 2    GFR If Non African American >60 >60 mL/min/1.73 m 2       All labs reviewed by me.    RADIOLOGY  US-RENAL   Final Result      Normal renal ultrasound.        The radiologist's interpretation of all radiological studies have been reviewed by me.    COURSE & MEDICAL DECISION MAKING  Pertinent Labs & Imaging studies reviewed. (See chart for details)    8:12 PM - Patient seen and examined at bedside. Patient will be treated with Toradol 15 mg. Ordered US-Renal, CBC with diff, BMP, Urine Microscopic, and Urinalysis to evaluate his symptoms. The differential diagnoses include but are not limited to: nephrolithiasis, pyelonephritis, atypical presentation of aortic dissection    Patient reports that he passed a stone while urinating.    8:58 PM - Patient was reevaluated at bedside.  Discussed lab and radiology results with the patient and informed them about the plan for discharge at this time. Patient verbalizes understanding and agreement to this plan of care.       Medical Decision Making: This point time patient's exam and urinalysis is consistent with a kidney stone.  Patient states that he passed the stone while urinating to give a urine sample.  At this point time he shows no signs of hydronephrosis or continued stone.  There is no signs of urinary tract infection.  Discussed following up with urology since he has had multiple stones now.     The patient will return for new or worsening symptoms and is stable at the time of discharge.    The patient is referred to a primary physician for blood pressure management, diabetic screening, and for all other preventative health concerns.    DISPOSITION:  Patient will be discharged home in stable condition.    FOLLOW UP:  Arik Martins M.D.  [address]  [place] [id number] [phone]    Schedule an appointment as soon as possible for a visit   As needed    Fan Santos M.D.  [address]  [place] [address]  [phone]    Schedule an appointment as soon as possible for a visit         OUTPATIENT MEDICATIONS:  New Prescriptions    ONDANSETRON (ZOFRAN ODT) 4 MG TABLET DISPERSIBLE    Take 1 Tab by mouth every 8 hours as needed.          FINAL IMPRESSION  1. Flank pain    2. Renal colic    3. Kidney stone          Minnie SADLER (Benji), am scribing for, and in the presence of, Jose Carlso Vera M.D.    Electronically signed by: Minnie Rodriguez (Benji), 2/6/2021    Jose Carlos SADLER M.D. personally performed the services described in this documentation, as scribed by Minnie Rodriguez in my presence, and it is both accurate and complete.    The note accurately reflects work and decisions made by me.  Jose Carlos Vera M.D.  2/6/2021  9:09 PM

## 2021-02-07 NOTE — DISCHARGE INSTRUCTIONS
Ibuprofen and Tylenol for pain. Return if you have increasing abdominal pain, fever, severe vomiting, or worsening pain.

## 2021-02-07 NOTE — ED TRIAGE NOTES
Chief Complaint   Patient presents with   • Flank Pain     sharp pain left flank since 1810. Denies dysuria. + nausea, vomited x 1. Hx of kidney stones.      Pt ambulatory to triage with above complaints. Instructed on clean catch urine collection. Pt calm at this time. Educated on triage process, encouraged to inform staff of any changes.

## 2021-07-30 ENCOUNTER — HOSPITAL ENCOUNTER (OUTPATIENT)
Facility: MEDICAL CENTER | Age: 26
End: 2021-07-30
Attending: PREVENTIVE MEDICINE
Payer: COMMERCIAL

## 2021-07-30 ENCOUNTER — EH NON-PROVIDER (OUTPATIENT)
Dept: OCCUPATIONAL MEDICINE | Facility: CLINIC | Age: 26
End: 2021-07-30

## 2021-07-30 DIAGNOSIS — Z11.59 ENCOUNTER FOR SCREENING FOR OTHER VIRAL DISEASES: ICD-10-CM

## 2021-07-30 LAB — COVID ORDER STATUS COVID19: NORMAL

## 2021-07-30 PROCEDURE — U0003 INFECTIOUS AGENT DETECTION BY NUCLEIC ACID (DNA OR RNA); SEVERE ACUTE RESPIRATORY SYNDROME CORONAVIRUS 2 (SARS-COV-2) (CORONAVIRUS DISEASE [COVID-19]), AMPLIFIED PROBE TECHNIQUE, MAKING USE OF HIGH THROUGHPUT TECHNOLOGIES AS DESCRIBED BY CMS-2020-01-R: HCPCS | Performed by: PREVENTIVE MEDICINE

## 2021-07-31 LAB
SARS-COV-2 RNA RESP QL NAA+PROBE: NOTDETECTED
SPECIMEN SOURCE: NORMAL

## 2021-08-02 ENCOUNTER — TELEPHONE (OUTPATIENT)
Dept: OCCUPATIONAL MEDICINE | Facility: CLINIC | Age: 26
End: 2021-08-02

## 2021-08-02 NOTE — TELEPHONE ENCOUNTER
Phone Number Called: [phone] (home)       Call outcome: Did not leave a detailed message. Requested patient to call back.    Message: COVID results are negative and patient is cleared form the COVID screening line. Patient is able to return to work for their next shift.

## 2021-09-24 ENCOUNTER — IMMUNIZATION (OUTPATIENT)
Dept: OCCUPATIONAL MEDICINE | Facility: CLINIC | Age: 26
End: 2021-09-24
Payer: COMMERCIAL

## 2021-09-24 DIAGNOSIS — Z23 NEED FOR VACCINATION: Primary | ICD-10-CM

## 2021-09-24 PROCEDURE — 90686 IIV4 VACC NO PRSV 0.5 ML IM: CPT | Performed by: NURSE PRACTITIONER

## 2022-01-28 ENCOUNTER — HOSPITAL ENCOUNTER (OUTPATIENT)
Facility: MEDICAL CENTER | Age: 27
End: 2022-01-28
Attending: PREVENTIVE MEDICINE
Payer: COMMERCIAL

## 2022-01-28 LAB — COVID ORDER STATUS COVID19: NORMAL

## 2022-01-29 LAB
SARS-COV-2 RNA RESP QL NAA+PROBE: DETECTED
SPECIMEN SOURCE: ABNORMAL

## 2022-09-29 ENCOUNTER — IMMUNIZATION (OUTPATIENT)
Dept: OCCUPATIONAL MEDICINE | Facility: CLINIC | Age: 27
End: 2022-09-29
Payer: COMMERCIAL

## 2022-09-29 DIAGNOSIS — Z23 NEED FOR VACCINATION: Primary | ICD-10-CM

## 2022-09-29 PROCEDURE — 90686 IIV4 VACC NO PRSV 0.5 ML IM: CPT | Performed by: PREVENTIVE MEDICINE

## 2023-02-10 ENCOUNTER — EH NON-PROVIDER (OUTPATIENT)
Dept: OCCUPATIONAL MEDICINE | Facility: CLINIC | Age: 28
End: 2023-02-10
Payer: COMMERCIAL

## 2023-07-30 ENCOUNTER — OFFICE VISIT (OUTPATIENT)
Dept: URGENT CARE | Facility: CLINIC | Age: 28
End: 2023-07-30
Payer: COMMERCIAL

## 2023-07-30 VITALS
RESPIRATION RATE: 12 BRPM | WEIGHT: 246 LBS | OXYGEN SATURATION: 98 % | HEIGHT: 68 IN | DIASTOLIC BLOOD PRESSURE: 76 MMHG | BODY MASS INDEX: 37.28 KG/M2 | HEART RATE: 106 BPM | SYSTOLIC BLOOD PRESSURE: 136 MMHG | TEMPERATURE: 99.1 F

## 2023-07-30 DIAGNOSIS — H66.91 ACUTE RIGHT OTITIS MEDIA: ICD-10-CM

## 2023-07-30 DIAGNOSIS — Z76.89 ENCOUNTER TO ESTABLISH CARE: ICD-10-CM

## 2023-07-30 DIAGNOSIS — R11.0 NAUSEA: ICD-10-CM

## 2023-07-30 DIAGNOSIS — J06.9 UPPER RESPIRATORY TRACT INFECTION, UNSPECIFIED TYPE: ICD-10-CM

## 2023-07-30 LAB
FLUAV RNA SPEC QL NAA+PROBE: NEGATIVE
FLUBV RNA SPEC QL NAA+PROBE: NEGATIVE
RSV RNA SPEC QL NAA+PROBE: NEGATIVE
SARS-COV-2 RNA RESP QL NAA+PROBE: NEGATIVE

## 2023-07-30 PROCEDURE — 3078F DIAST BP <80 MM HG: CPT | Performed by: STUDENT IN AN ORGANIZED HEALTH CARE EDUCATION/TRAINING PROGRAM

## 2023-07-30 PROCEDURE — 99213 OFFICE O/P EST LOW 20 MIN: CPT | Performed by: STUDENT IN AN ORGANIZED HEALTH CARE EDUCATION/TRAINING PROGRAM

## 2023-07-30 PROCEDURE — 0241U POCT CEPHEID COV-2, FLU A/B, RSV - PCR: CPT | Performed by: STUDENT IN AN ORGANIZED HEALTH CARE EDUCATION/TRAINING PROGRAM

## 2023-07-30 PROCEDURE — 3075F SYST BP GE 130 - 139MM HG: CPT | Performed by: STUDENT IN AN ORGANIZED HEALTH CARE EDUCATION/TRAINING PROGRAM

## 2023-07-30 RX ORDER — ONDANSETRON 4 MG/1
4 TABLET, FILM COATED ORAL EVERY 4 HOURS PRN
Qty: 15 TABLET | Refills: 0 | Status: SHIPPED | OUTPATIENT
Start: 2023-07-30

## 2023-07-30 RX ORDER — AMOXICILLIN AND CLAVULANATE POTASSIUM 875; 125 MG/1; MG/1
1 TABLET, FILM COATED ORAL 2 TIMES DAILY
Qty: 20 TABLET | Refills: 0 | Status: SHIPPED | OUTPATIENT
Start: 2023-07-30 | End: 2023-08-09

## 2023-07-30 NOTE — PROGRESS NOTES
"Subjective:   Jayden Nielsen is a 28 y.o. male who presents for Sinus Problem (X 2 days, started with throat irritation and post-nasal drip. Now pt having sinus closure on right side, nausea, stomach irritation, and headaches)      HPI:  This is a pleasant 28-year-old male who presents urgent care for 3 days of postnasal drip, sore throat, nasal congestion, nausea from his postnasal drip, fatigue, and intermittent headache.  Patient reports long history of sinus infections and states that he has been getting them more frequently.  Initially was only getting sinus infections 2-3 times a year but is now been getting them anywhere from 5-7 times per year.  He has been using and decongestion as well as Claritin.  Denies fever, chills, vomiting, current abdominal pain, diarrhea, constipation, blood in stool, melena, dizziness, chest pain, shortness of breath, cough, or ear pain.  He does report some muffled hearing bilaterally.      Medications:    amoxicillin-clavulanate Tabs  ondansetron Tbdp    Allergies: Patient has no known allergies.    Problem List: Jayden Nielsen does not have any pertinent problems on file.    Surgical History:  No past surgical history on file.    Past Social Hx: Jayden Nielsen  reports that he has never smoked. He has never used smokeless tobacco. He reports current alcohol use. He reports that he does not use drugs.     Past Family Hx:  Jayden Nielsen family history is not on file.     Problem list, medications, and allergies reviewed by myself today in Epic.     Objective:     /76   Pulse (!) 106   Temp 37.3 °C (99.1 °F) (Temporal)   Resp 12   Ht 1.732 m (5' 8.19\")   Wt 112 kg (246 lb)   SpO2 98%   BMI 37.20 kg/m²     Physical Exam  Vitals reviewed.   Constitutional:       General: He is not in acute distress.     Appearance: Normal appearance.   HENT:      Head: Normocephalic.      Right Ear: Hearing, ear canal and external ear normal. No middle ear effusion. No mastoid " tenderness. Tympanic membrane is erythematous and bulging. Tympanic membrane is not perforated.      Left Ear: Hearing, ear canal and external ear normal. A middle ear effusion is present. No mastoid tenderness. Tympanic membrane is not perforated, erythematous or bulging.      Nose: Congestion present.      Mouth/Throat:      Mouth: Mucous membranes are moist.      Pharynx: Uvula midline. Posterior oropharyngeal erythema present. No oropharyngeal exudate.      Tonsils: No tonsillar exudate or tonsillar abscesses. 0 on the right. 0 on the left.   Eyes:      Conjunctiva/sclera: Conjunctivae normal.      Pupils: Pupils are equal, round, and reactive to light.   Cardiovascular:      Rate and Rhythm: Normal rate and regular rhythm.      Pulses: Normal pulses.      Heart sounds: Normal heart sounds. No murmur heard.  Pulmonary:      Effort: Pulmonary effort is normal. No respiratory distress.      Breath sounds: Normal breath sounds. No stridor. No wheezing, rhonchi or rales.   Musculoskeletal:      Cervical back: Normal range of motion.   Neurological:      Mental Status: He is alert.         Assessment/Plan:     Diagnosis and associated orders:     1. Upper respiratory tract infection, unspecified type  POCT CoV-2, Flu A/B, RSV by PCR      2. Acute right otitis media  amoxicillin-clavulanate (AUGMENTIN) 875-125 MG Tab      3. Encounter to establish care  Referral to ENT      4. Nausea  ondansetron (ZOFRAN) 4 MG Tab tablet         Comments/MDM:     Patient's presentation physical exam findings are consistent with upper respiratory tract infection.  We will test for COVID-19, flu, and RSV via PCR.  He does appear to have secondary acute right otitis media which we will treat with Augmentin.  Patient denies any known allergies to this medication.  Advised on the use this medication and the possible side effects including allergic reaction.  Continue decongestions and daily allergy medication.  I do not suspect bacterial  sinus infection at this time given the length of his symptoms.  He does report some nausea which appears to be due to his postnasal drip.  Abdominal exam shows no tenderness, rebound, guarding, or rigidity.  No signs of acute abdomen.  Zofran given to better control his nausea.  Patient is tachycardic in clinic does report feeling feverish.  States his temperature has been elevated which would correlate with some tachycardia.  Denies any chest pain or palpitations.  No shortness of breath.  Discussed home supportive care with OTC cold medication.  Strict ED/return precautions given.  PCR COVID-19, influenza, and RSV negative.         Differential diagnosis, natural history, supportive care, and indications for immediate follow-up discussed.    Advised the patient to follow-up with the primary care physician for recheck, reevaluation, and consideration of further management.    Please note that this dictation was created using voice recognition software. I have made a reasonable attempt to correct obvious errors, but I expect that there are errors of grammar and possibly content that I did not discover before finalizing the note.    Electronically signed by Jayson Mcqueen PA-C.

## 2023-07-30 NOTE — LETTER
July 30, 2023    To Whom It May Concern:         This is confirmation that Jayden Beltránes attended his scheduled appointment with Jayson Mcqueen P.A.-C. on 7/30/23.  Patient is excuse from work on 7/30/2023 through 8/4/2023.  May return to work early if feeling better.         If you have any questions please do not hesitate to call me at the phone number listed below.    Sincerely,          Jayson Mcqueen P.A.-C.  [phone]

## 2023-10-08 ENCOUNTER — OFFICE VISIT (OUTPATIENT)
Dept: URGENT CARE | Facility: CLINIC | Age: 28
End: 2023-10-08
Payer: COMMERCIAL

## 2023-10-08 VITALS
RESPIRATION RATE: 16 BRPM | HEART RATE: 76 BPM | WEIGHT: 246 LBS | TEMPERATURE: 97.3 F | OXYGEN SATURATION: 95 % | SYSTOLIC BLOOD PRESSURE: 118 MMHG | HEIGHT: 69 IN | BODY MASS INDEX: 36.43 KG/M2 | DIASTOLIC BLOOD PRESSURE: 72 MMHG

## 2023-10-08 DIAGNOSIS — J06.9 VIRAL URI WITH COUGH: ICD-10-CM

## 2023-10-08 DIAGNOSIS — R05.1 ACUTE COUGH: ICD-10-CM

## 2023-10-08 DIAGNOSIS — R50.9 FEVER, UNSPECIFIED FEVER CAUSE: ICD-10-CM

## 2023-10-08 PROCEDURE — 3074F SYST BP LT 130 MM HG: CPT | Performed by: PHYSICIAN ASSISTANT

## 2023-10-08 PROCEDURE — 99213 OFFICE O/P EST LOW 20 MIN: CPT | Performed by: PHYSICIAN ASSISTANT

## 2023-10-08 PROCEDURE — 0241U POCT CEPHEID COV-2, FLU A/B, RSV - PCR: CPT | Performed by: PHYSICIAN ASSISTANT

## 2023-10-08 PROCEDURE — 3078F DIAST BP <80 MM HG: CPT | Performed by: PHYSICIAN ASSISTANT

## 2023-10-08 RX ORDER — BENZONATATE 100 MG/1
100 CAPSULE ORAL 3 TIMES DAILY PRN
Qty: 20 CAPSULE | Refills: 0 | Status: SHIPPED | OUTPATIENT
Start: 2023-10-08

## 2023-10-08 ASSESSMENT — ENCOUNTER SYMPTOMS: COUGH: 1

## 2023-10-08 NOTE — LETTER
October 8, 2023    To Whom It May Concern:         This is confirmation that Jayden K Nielsen attended his scheduled appointment with Ashia Srinivasan P.A.-C. on 10/08/23.  Please excuse patient from work 10/5-10/7.  Further testing pending.         If you have any questions please do not hesitate to call me at the phone number listed below.    Sincerely,          Ashia Srinivasan P.A.-C.  [phone]

## 2023-10-08 NOTE — PROGRESS NOTES
"Subjective:   Jayden Nielsen is a 28 y.o. male who presents for Cough (X 2 months, nasal congestion )     Nasal congestion, cough, fatigue, myalgias, fever, nausea since Monday  Treated with augmentin for om abuot one month ago  Has has onging cough and intermittent ear pressure  Denies significant sinus pressure currently  Denies shortness of breath  Cough primarily nonproductive other than mornings  Fevers and body aches since Monday with associated fatigue  No underlying respiratory illnesses  Does get frequent sinus infections has pending referral to ENT  Has missed work the last 3 days        Review of Systems   Respiratory:  Positive for cough.        Medications:  ondansetron Tabs    Allergies:             Patient has no known allergies.    Surgical History:       No past surgical history on file.    Past Social Hx:  Jayden Nielsen  reports that he has never smoked. He has never used smokeless tobacco. He reports current alcohol use. He reports that he does not use drugs.     Past Family Hx:   Jayden Nielsen family history is not on file.       Problem list, medications, and allergies reviewed by myself today in Epic.     Objective:     /72   Pulse 76   Temp 36.3 °C (97.3 °F)   Resp 16   Ht 1.753 m (5' 9\")   Wt 112 kg (246 lb)   SpO2 95%   BMI 36.33 kg/m²     Physical Exam  Vitals and nursing note reviewed.   Constitutional:       General: He is not in acute distress.     Appearance: Normal appearance. He is not ill-appearing or toxic-appearing.   HENT:      Head: Normocephalic.      Jaw: No trismus.      Right Ear: External ear normal. No drainage. A middle ear effusion is present. No mastoid tenderness. Tympanic membrane is not erythematous or bulging.      Left Ear: External ear normal. No drainage. A middle ear effusion is present. No mastoid tenderness. Tympanic membrane is not erythematous or bulging.      Nose: Congestion and rhinorrhea present.      Right Turbinates: Enlarged and " swollen.      Left Turbinates: Enlarged and swollen.      Mouth/Throat:      Mouth: Mucous membranes are moist.      Tongue: No lesions. Tongue does not deviate from midline.      Palate: No lesions.      Pharynx: Uvula midline. Posterior oropharyngeal erythema present. No oropharyngeal exudate or uvula swelling.      Tonsils: No tonsillar exudate or tonsillar abscesses.   Eyes:      General:         Right eye: No discharge.         Left eye: No discharge.      Extraocular Movements: Extraocular movements intact.   Cardiovascular:      Rate and Rhythm: Normal rate and regular rhythm.      Pulses: Normal pulses.      Heart sounds: Normal heart sounds. No murmur heard.  Pulmonary:      Effort: Pulmonary effort is normal. No accessory muscle usage or respiratory distress.      Breath sounds: Normal breath sounds and air entry. No stridor or decreased air movement. No wheezing, rhonchi or rales.      Comments: Lungs CTA b/l  Musculoskeletal:      Cervical back: Normal range of motion. No rigidity.   Lymphadenopathy:      Head:      Right side of head: No tonsillar adenopathy.      Left side of head: No tonsillar adenopathy.      Cervical: No cervical adenopathy.   Skin:     General: Skin is warm.      Findings: No rash.   Neurological:      Mental Status: He is alert.   Psychiatric:         Behavior: Behavior is cooperative.       Results for orders placed or performed in visit on 10/08/23   POCT CEPHEID COV-2, FLU A/B, RSV - PCR   Result Value Ref Range    SARS-CoV-2 by PCR Negative Negative, Invalid    Influenza virus A RNA Negative Negative, Invalid    Influenza virus B, PCR Negative Negative, Invalid    RSV, PCR Negative Negative, Invalid         Assessment/Plan:     Diagnosis and Associated Orders:     1. Fever, unspecified fever cause  - POCT CEPHEID COV-2, FLU A/B, RSV - PCR    2. Acute cough  - POCT CEPHEID COV-2, FLU A/B, RSV - PCR  - benzonatate (TESSALON) 100 MG Cap; Take 1 Capsule by mouth 3 times a day as  needed for Cough.  Dispense: 20 Capsule; Refill: 0    3. Viral URI with cough        Comments/MDM:  Viral PCR negative.  Suspect viral etiology.  Vital signs stable and reassuring.  Lungs clear to auscultation bilaterally.  Patient is not hypoxic.  Recommend conservative measures as below.      Symptomatic and supportive care:   Plenty of oral hydration and rest   Over the counter cough suppressant as directed.  Tylenol or ibuprofen for pain and fever as directed.   Warm salt water gargles for sore throat, soft foods, cool liquids.   Saline nasal spray, Flonase, and/or otc sudafed (if no history of hypertension) as a decongestant.   Infection control measures at home. Stay away from people, Hand washing, covering sneeze/cough, disinfect surfaces.   Remain home from work, school, and other populated environments while ill.  Overall, the patient is well-appearing. They are not hypoxic, afebrile, and a normal pulmonary exam.      Patient should to proceed to ED for development of symptoms including but not limited to shortness of breath breath, respiratory distress, increased fever, persistent symptoms, or worsening symptoms not manageable at home.    I personally reviewed prior external notes and test results pertinent to today's visit. Supportive care, natural history, differential diagnoses, and indications for immediate follow-up discussed. Return to clinic or go to ED if symptoms worsen or persist.  Red flag symptoms discussed.  Patient/Parent/Guardian voices understanding. Follow-up with your primary care provider in 3-5 days.  All side effects of medication discussed including allergic response, GI upset, tendon injury, rash, sedation etc    Please note that this dictation was created using voice recognition software. I have made a reasonable attempt to correct obvious errors, but I expect that there are errors of grammar and possibly content that I did not discover before finalizing the note.    This note was  electronically signed by Ashia Srinivasan PA-C

## 2024-03-12 ENCOUNTER — EH NON-PROVIDER (OUTPATIENT)
Dept: OCCUPATIONAL MEDICINE | Facility: CLINIC | Age: 29
End: 2024-03-12

## 2024-03-17 ENCOUNTER — APPOINTMENT (OUTPATIENT)
Dept: RADIOLOGY | Facility: IMAGING CENTER | Age: 29
End: 2024-03-17
Payer: COMMERCIAL

## 2024-03-17 ENCOUNTER — OFFICE VISIT (OUTPATIENT)
Dept: URGENT CARE | Facility: CLINIC | Age: 29
End: 2024-03-17
Payer: COMMERCIAL

## 2024-03-17 VITALS
TEMPERATURE: 97.7 F | WEIGHT: 245.5 LBS | SYSTOLIC BLOOD PRESSURE: 128 MMHG | BODY MASS INDEX: 36.36 KG/M2 | RESPIRATION RATE: 12 BRPM | DIASTOLIC BLOOD PRESSURE: 80 MMHG | OXYGEN SATURATION: 96 % | HEART RATE: 101 BPM | HEIGHT: 69 IN

## 2024-03-17 DIAGNOSIS — R19.7 DIARRHEA, UNSPECIFIED TYPE: ICD-10-CM

## 2024-03-17 DIAGNOSIS — R11.2 NAUSEA AND VOMITING, UNSPECIFIED VOMITING TYPE: ICD-10-CM

## 2024-03-17 DIAGNOSIS — J18.9 COMMUNITY ACQUIRED PNEUMONIA OF LEFT LOWER LOBE OF LUNG: ICD-10-CM

## 2024-03-17 PROCEDURE — 99213 OFFICE O/P EST LOW 20 MIN: CPT

## 2024-03-17 PROCEDURE — 3079F DIAST BP 80-89 MM HG: CPT

## 2024-03-17 PROCEDURE — 3074F SYST BP LT 130 MM HG: CPT

## 2024-03-17 PROCEDURE — 74019 RADEX ABDOMEN 2 VIEWS: CPT | Mod: TC | Performed by: RADIOLOGY

## 2024-03-17 RX ORDER — AZITHROMYCIN 250 MG/1
TABLET, FILM COATED ORAL
Qty: 6 TABLET | Refills: 0 | Status: SHIPPED | OUTPATIENT
Start: 2024-03-17

## 2024-03-17 RX ORDER — ALBUTEROL SULFATE 90 UG/1
2 AEROSOL, METERED RESPIRATORY (INHALATION) EVERY 6 HOURS PRN
Qty: 8.5 G | Refills: 0 | Status: SHIPPED | OUTPATIENT
Start: 2024-03-17

## 2024-03-17 RX ORDER — PREDNISONE 10 MG/1
40 TABLET ORAL DAILY
Qty: 20 TABLET | Refills: 0 | Status: SHIPPED | OUTPATIENT
Start: 2024-03-17 | End: 2024-03-22

## 2024-03-17 RX ORDER — AMOXICILLIN 875 MG/1
875 TABLET, COATED ORAL 2 TIMES DAILY
Qty: 10 TABLET | Refills: 0 | Status: SHIPPED | OUTPATIENT
Start: 2024-03-17 | End: 2024-03-22

## 2024-03-17 NOTE — LETTER
March 17, 2024    To Whom It May Concern:         This is confirmation that Jaydenzelalem Nielsen attended his scheduled appointment with ARMANI Sethi on 3/17/24.    He may return to work when he is free of fever for 24 hours without the use of Tylenol or Ibuprofen.          If you have any questions please do not hesitate to call me at the phone number listed below.    Sincerely,      ANA PAULA Sethi.  [phone]

## 2024-03-17 NOTE — PROGRESS NOTES
Subjective:   Jayden Nielsen is a 29 y.o. male who presents for Abdominal Pain (X2days only able to drink liquids or soft foods.), Emesis, and Nausea      HPI:    Presents urgent care with concerns of abdominal pain, nausea, vomiting, diarrhea x 2 days.   He states he is concerned he has an abdominal obstruction.    He reports increased belching, decreased flatus, increased distention.  He denies flulike symptoms.  He denies smoking, history of asthma.   Denies chest pain, shortness of breath, palpitations, dizziness, leg swelling, orthopnea, cough.  He reports his abdominal pain is mild like cramping sensation and generalized across his abdomen.  He denies fever, chills.  Denies history of abdominal surgeries, congenital abdominal issues.  He is concerned that he has an abdominal obstruction.  Denies history of ileus or SBO.  He states he is only able to eat liquids or soft foods.  He has not tried anything over-the-counter for his symptoms.  Reports history of kidney stones.  Denies dysuria, hematuria.  Denies presence of blood in his stool.  Reports stool is brown and soft to watery.  He is requesting abdominal x-ray for evaluation of obstruction.  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  Has had contact with influenza, COVID occupationally.   Denies history of IBS, IBD.      ROS As above in HPI    Medications:    Current Outpatient Medications on File Prior to Visit   Medication Sig Dispense Refill    ondansetron (ZOFRAN) 4 MG Tab tablet Take 1 Tablet by mouth every four hours as needed for Nausea/Vomiting. 15 Tablet 0    benzonatate (TESSALON) 100 MG Cap Take 1 Capsule by mouth 3 times a day as needed for Cough. 20 Capsule 0     No current facility-administered medications on file prior to visit.        Allergies:   Patient has no known allergies.    Problem List:   Patient Active Problem List   Diagnosis    Ureteral stone        Surgical History:  No past surgical history on file.    Past Social  "Hx:   Social History     Tobacco Use    Smoking status: Never    Smokeless tobacco: Never   Vaping Use    Vaping Use: Never used   Substance Use Topics    Alcohol use: Yes     Comment: socially, maybe 2 drinks a year    Drug use: No          Problem list, medications, and allergies reviewed by myself today in Epic.     Objective:     /80 (BP Location: Left arm, Patient Position: Sitting, BP Cuff Size: Large adult)   Pulse (!) 101   Temp 36.5 °C (97.7 °F) (Temporal)   Resp 12   Ht 1.753 m (5' 9\")   Wt 111 kg (245 lb 8 oz)   SpO2 96%   BMI 36.25 kg/m²     Physical Exam  Vitals and nursing note reviewed.   Constitutional:       General: He is not in acute distress.     Appearance: Normal appearance. He is not ill-appearing or diaphoretic.   HENT:      Head: Normocephalic and atraumatic.      Right Ear: Tympanic membrane and ear canal normal.      Left Ear: Tympanic membrane and ear canal normal.      Nose: Nose normal.      Mouth/Throat:      Mouth: Mucous membranes are moist.      Pharynx: Oropharynx is clear.   Cardiovascular:      Rate and Rhythm: Normal rate and regular rhythm.      Heart sounds: Normal heart sounds. No murmur heard.     No friction rub. No gallop.   Pulmonary:      Effort: Pulmonary effort is normal. No respiratory distress.      Breath sounds: Normal breath sounds. No stridor. No wheezing, rhonchi or rales.   Chest:      Chest wall: No tenderness.   Abdominal:      General: Bowel sounds are decreased. There is distension.      Palpations: Abdomen is soft. There is no hepatomegaly, splenomegaly, mass or pulsatile mass.      Tenderness: There is no abdominal tenderness. There is no right CVA tenderness, left CVA tenderness, guarding or rebound.      Hernia: No hernia is present.   Musculoskeletal:      Cervical back: No rigidity or tenderness.   Lymphadenopathy:      Cervical: No cervical adenopathy.   Skin:     General: Skin is warm and dry.      Capillary Refill: Capillary refill " "takes less than 2 seconds.      Findings: No rash.   Neurological:      Mental Status: He is alert and oriented to person, place, and time.         Assessment/Plan:     UX-MBDAMEI-2 VIEWS 03/17/2024    Narrative  3/17/2024 9:10 AM    HISTORY/REASON FOR EXAM:  Vomiting; nausea, vomiting, distended, diarrhea x 2 days.    TECHNIQUE/EXAM DESCRIPTION AND NUMBER OF VIEWS:  2 view(s) of the abdomen.    COMPARISON: CT abdomen and pelvis 10/11/2018    FINDINGS:  No evidence for small bowel obstruction.  No gross mass or abnormal calcification.  No evidence for pneumoperitoneum.  No major bony abnormality is seen.  Patchy opacity LEFT lung base.    Impression  1.  No evidence of bowel obstruction or perforation.  2.  Patchy LEFT lung base opacity concerning for pneumonia.       Diagnosis and associated orders:   1. Community acquired pneumonia of left lower lobe of lung  - amoxicillin (AMOXIL) 875 MG tablet; Take 1 Tablet by mouth 2 times a day for 5 days.  Dispense: 10 Tablet; Refill: 0  - azithromycin (ZITHROMAX) 250 MG Tab; Take 2 tablets by mouth on day one. Take one tablet by mouth the remaining days until gone  Dispense: 6 Tablet; Refill: 0  - albuterol 108 (90 Base) MCG/ACT Aero Soln inhalation aerosol; Inhale 2 Puffs every 6 hours as needed for Shortness of Breath.  Dispense: 8.5 g; Refill: 0  - predniSONE (DELTASONE) 10 MG Tab; Take 4 Tablets by mouth every day for 5 days.  Dispense: 20 Tablet; Refill: 0    2. Nausea and vomiting, unspecified vomiting type  - NO-JMACCFB-6 VIEWS; Future    3. Diarrhea, unspecified type  - LL-WBEBSFB-5 VIEWS; Future        Comments/MDM:     Patient presents to urgent care with concerns of SBO after two  days of nausea, vomiting, generalized abdominal pain. Abdominal xray obtained, per radiology impression, there is 1.  No evidence of bowel obstruction or perforation. 2.  Patchy LEFT lung base opacity concerning for pneumonia. After discussing results with patient, he states he \"passed " "out\" by this toilet yesterday while vomiting. He denies flu-like symptoms. Possible aspiration pneumonia?   Patient started on dual therapy antibiotics for CAP of left lower lung. Albuterol and prednisone also ordered.   Reviewed deep breathing exercises, use of an incentive spirometer.   Follow up with PCP advised.  Strict return to ER precautions reviewed.   Patient verbalized understanding and consented to plan of care.       Return to clinic or go to ED if symptoms worsen or persist. Indications for ED discussed at length. Patient/Parent/Guardian voices understanding. Follow-up with your primary care provider in 3-5 days. Red flag symptoms discussed. All side effects of medication discussed including allergic response, GI upset, tendon injury, rash, sedation etc.    Please note that this dictation was created using voice recognition software. I have made a reasonable attempt to correct obvious errors, but I expect that there are errors of grammar and possibly content that I did not discover before finalizing the note.    This note was electronically signed by NARCISA Edwards    "

## 2024-05-06 ENCOUNTER — HOSPITAL ENCOUNTER (EMERGENCY)
Facility: MEDICAL CENTER | Age: 29
End: 2024-05-06
Attending: STUDENT IN AN ORGANIZED HEALTH CARE EDUCATION/TRAINING PROGRAM
Payer: COMMERCIAL

## 2024-05-06 ENCOUNTER — APPOINTMENT (OUTPATIENT)
Dept: RADIOLOGY | Facility: MEDICAL CENTER | Age: 29
End: 2024-05-06
Attending: STUDENT IN AN ORGANIZED HEALTH CARE EDUCATION/TRAINING PROGRAM
Payer: COMMERCIAL

## 2024-05-06 VITALS
DIASTOLIC BLOOD PRESSURE: 81 MMHG | TEMPERATURE: 97.3 F | WEIGHT: 247.36 LBS | BODY MASS INDEX: 37.49 KG/M2 | HEART RATE: 99 BPM | HEIGHT: 68 IN | SYSTOLIC BLOOD PRESSURE: 127 MMHG | OXYGEN SATURATION: 98 % | RESPIRATION RATE: 18 BRPM

## 2024-05-06 DIAGNOSIS — R10.12 LEFT UPPER QUADRANT ABDOMINAL PAIN: ICD-10-CM

## 2024-05-06 LAB
ALBUMIN SERPL BCP-MCNC: 4.6 G/DL (ref 3.2–4.9)
ALBUMIN/GLOB SERPL: 1.8 G/DL
ALP SERPL-CCNC: 88 U/L (ref 30–99)
ALT SERPL-CCNC: 41 U/L (ref 2–50)
ANION GAP SERPL CALC-SCNC: 15 MMOL/L (ref 7–16)
AST SERPL-CCNC: 25 U/L (ref 12–45)
BASOPHILS # BLD AUTO: 0.6 % (ref 0–1.8)
BASOPHILS # BLD: 0.06 K/UL (ref 0–0.12)
BILIRUB SERPL-MCNC: 0.7 MG/DL (ref 0.1–1.5)
BUN SERPL-MCNC: 16 MG/DL (ref 8–22)
CALCIUM ALBUM COR SERPL-MCNC: 8.7 MG/DL (ref 8.5–10.5)
CALCIUM SERPL-MCNC: 9.2 MG/DL (ref 8.5–10.5)
CHLORIDE SERPL-SCNC: 108 MMOL/L (ref 96–112)
CO2 SERPL-SCNC: 21 MMOL/L (ref 20–33)
CREAT SERPL-MCNC: 1.19 MG/DL (ref 0.5–1.4)
EKG IMPRESSION: NORMAL
EOSINOPHIL # BLD AUTO: 0.09 K/UL (ref 0–0.51)
EOSINOPHIL NFR BLD: 0.9 % (ref 0–6.9)
ERYTHROCYTE [DISTWIDTH] IN BLOOD BY AUTOMATED COUNT: 41.8 FL (ref 35.9–50)
GFR SERPLBLD CREATININE-BSD FMLA CKD-EPI: 85 ML/MIN/1.73 M 2
GLOBULIN SER CALC-MCNC: 2.6 G/DL (ref 1.9–3.5)
GLUCOSE SERPL-MCNC: 110 MG/DL (ref 65–99)
HCT VFR BLD AUTO: 50 % (ref 42–52)
HGB BLD-MCNC: 16.6 G/DL (ref 14–18)
IMM GRANULOCYTES # BLD AUTO: 0.03 K/UL (ref 0–0.11)
IMM GRANULOCYTES NFR BLD AUTO: 0.3 % (ref 0–0.9)
LIPASE SERPL-CCNC: 24 U/L (ref 11–82)
LYMPHOCYTES # BLD AUTO: 1.85 K/UL (ref 1–4.8)
LYMPHOCYTES NFR BLD: 18.8 % (ref 22–41)
MCH RBC QN AUTO: 29.6 PG (ref 27–33)
MCHC RBC AUTO-ENTMCNC: 33.2 G/DL (ref 32.3–36.5)
MCV RBC AUTO: 89.1 FL (ref 81.4–97.8)
MONOCYTES # BLD AUTO: 0.59 K/UL (ref 0–0.85)
MONOCYTES NFR BLD AUTO: 6 % (ref 0–13.4)
NEUTROPHILS # BLD AUTO: 7.22 K/UL (ref 1.82–7.42)
NEUTROPHILS NFR BLD: 73.4 % (ref 44–72)
NRBC # BLD AUTO: 0 K/UL
NRBC BLD-RTO: 0 /100 WBC (ref 0–0.2)
PLATELET # BLD AUTO: 344 K/UL (ref 164–446)
PMV BLD AUTO: 9.3 FL (ref 9–12.9)
POTASSIUM SERPL-SCNC: 3.6 MMOL/L (ref 3.6–5.5)
PROT SERPL-MCNC: 7.2 G/DL (ref 6–8.2)
RBC # BLD AUTO: 5.61 M/UL (ref 4.7–6.1)
SODIUM SERPL-SCNC: 144 MMOL/L (ref 135–145)
WBC # BLD AUTO: 9.8 K/UL (ref 4.8–10.8)

## 2024-05-06 RX ADMIN — IOHEXOL 100 ML: 350 INJECTION, SOLUTION INTRAVENOUS at 19:50

## 2024-05-06 ASSESSMENT — PAIN DESCRIPTION - PAIN TYPE: TYPE: ACUTE PAIN

## 2024-05-07 ENCOUNTER — OFFICE VISIT (OUTPATIENT)
Dept: MEDICAL GROUP | Facility: LAB | Age: 29
End: 2024-05-07
Payer: COMMERCIAL

## 2024-05-07 VITALS
WEIGHT: 246.91 LBS | HEART RATE: 95 BPM | DIASTOLIC BLOOD PRESSURE: 68 MMHG | OXYGEN SATURATION: 98 % | HEIGHT: 68 IN | RESPIRATION RATE: 16 BRPM | TEMPERATURE: 96.9 F | SYSTOLIC BLOOD PRESSURE: 120 MMHG | BODY MASS INDEX: 37.42 KG/M2

## 2024-05-07 DIAGNOSIS — E55.9 VITAMIN D DEFICIENCY: ICD-10-CM

## 2024-05-07 DIAGNOSIS — Z13.29 SCREENING FOR THYROID DISORDER: ICD-10-CM

## 2024-05-07 DIAGNOSIS — R10.12 LUQ PAIN: ICD-10-CM

## 2024-05-07 DIAGNOSIS — Z23 NEED FOR VACCINATION: ICD-10-CM

## 2024-05-07 DIAGNOSIS — Z13.220 LIPID SCREENING: ICD-10-CM

## 2024-05-07 DIAGNOSIS — Z76.89 ENCOUNTER TO ESTABLISH CARE: ICD-10-CM

## 2024-05-07 DIAGNOSIS — Z11.59 NEED FOR HEPATITIS C SCREENING TEST: ICD-10-CM

## 2024-05-07 DIAGNOSIS — Z13.1 DIABETES MELLITUS SCREENING: ICD-10-CM

## 2024-05-07 DIAGNOSIS — R10.13 EPIGASTRIC BURNING SENSATION: ICD-10-CM

## 2024-05-07 PROCEDURE — 90471 IMMUNIZATION ADMIN: CPT | Performed by: PHYSICIAN ASSISTANT

## 2024-05-07 PROCEDURE — 90632 HEPA VACCINE ADULT IM: CPT | Performed by: PHYSICIAN ASSISTANT

## 2024-05-07 PROCEDURE — 3078F DIAST BP <80 MM HG: CPT | Performed by: PHYSICIAN ASSISTANT

## 2024-05-07 PROCEDURE — 99214 OFFICE O/P EST MOD 30 MIN: CPT | Mod: 25 | Performed by: PHYSICIAN ASSISTANT

## 2024-05-07 PROCEDURE — 3074F SYST BP LT 130 MM HG: CPT | Performed by: PHYSICIAN ASSISTANT

## 2024-05-07 RX ORDER — OMEPRAZOLE 20 MG/1
20 CAPSULE, DELAYED RELEASE ORAL DAILY
Qty: 14 CAPSULE | Refills: 0 | Status: SHIPPED | OUTPATIENT
Start: 2024-05-07 | End: 2024-05-21

## 2024-05-07 SDOH — ECONOMIC STABILITY: HOUSING INSECURITY
IN THE LAST 12 MONTHS, WAS THERE A TIME WHEN YOU DID NOT HAVE A STEADY PLACE TO SLEEP OR SLEPT IN A SHELTER (INCLUDING NOW)?: NO

## 2024-05-07 SDOH — HEALTH STABILITY: PHYSICAL HEALTH: ON AVERAGE, HOW MANY MINUTES DO YOU ENGAGE IN EXERCISE AT THIS LEVEL?: 30 MIN

## 2024-05-07 SDOH — HEALTH STABILITY: PHYSICAL HEALTH: ON AVERAGE, HOW MANY DAYS PER WEEK DO YOU ENGAGE IN MODERATE TO STRENUOUS EXERCISE (LIKE A BRISK WALK)?: 4 DAYS

## 2024-05-07 SDOH — ECONOMIC STABILITY: INCOME INSECURITY: HOW HARD IS IT FOR YOU TO PAY FOR THE VERY BASICS LIKE FOOD, HOUSING, MEDICAL CARE, AND HEATING?: NOT HARD AT ALL

## 2024-05-07 SDOH — HEALTH STABILITY: MENTAL HEALTH
STRESS IS WHEN SOMEONE FEELS TENSE, NERVOUS, ANXIOUS, OR CAN'T SLEEP AT NIGHT BECAUSE THEIR MIND IS TROUBLED. HOW STRESSED ARE YOU?: TO SOME EXTENT

## 2024-05-07 SDOH — ECONOMIC STABILITY: FOOD INSECURITY: WITHIN THE PAST 12 MONTHS, YOU WORRIED THAT YOUR FOOD WOULD RUN OUT BEFORE YOU GOT MONEY TO BUY MORE.: NEVER TRUE

## 2024-05-07 SDOH — ECONOMIC STABILITY: HOUSING INSECURITY: IN THE LAST 12 MONTHS, HOW MANY PLACES HAVE YOU LIVED?: 1

## 2024-05-07 SDOH — ECONOMIC STABILITY: TRANSPORTATION INSECURITY
IN THE PAST 12 MONTHS, HAS THE LACK OF TRANSPORTATION KEPT YOU FROM MEDICAL APPOINTMENTS OR FROM GETTING MEDICATIONS?: NO

## 2024-05-07 SDOH — ECONOMIC STABILITY: FOOD INSECURITY: WITHIN THE PAST 12 MONTHS, THE FOOD YOU BOUGHT JUST DIDN'T LAST AND YOU DIDN'T HAVE MONEY TO GET MORE.: NEVER TRUE

## 2024-05-07 SDOH — ECONOMIC STABILITY: INCOME INSECURITY: IN THE LAST 12 MONTHS, WAS THERE A TIME WHEN YOU WERE NOT ABLE TO PAY THE MORTGAGE OR RENT ON TIME?: NO

## 2024-05-07 SDOH — ECONOMIC STABILITY: TRANSPORTATION INSECURITY
IN THE PAST 12 MONTHS, HAS LACK OF RELIABLE TRANSPORTATION KEPT YOU FROM MEDICAL APPOINTMENTS, MEETINGS, WORK OR FROM GETTING THINGS NEEDED FOR DAILY LIVING?: NO

## 2024-05-07 SDOH — ECONOMIC STABILITY: TRANSPORTATION INSECURITY
IN THE PAST 12 MONTHS, HAS LACK OF TRANSPORTATION KEPT YOU FROM MEETINGS, WORK, OR FROM GETTING THINGS NEEDED FOR DAILY LIVING?: NO

## 2024-05-07 ASSESSMENT — LIFESTYLE VARIABLES
HOW OFTEN DO YOU HAVE A DRINK CONTAINING ALCOHOL: MONTHLY OR LESS
HOW MANY STANDARD DRINKS CONTAINING ALCOHOL DO YOU HAVE ON A TYPICAL DAY: 1 OR 2
SKIP TO QUESTIONS 9-10: 1
AUDIT-C TOTAL SCORE: 1
HOW OFTEN DO YOU HAVE SIX OR MORE DRINKS ON ONE OCCASION: NEVER

## 2024-05-07 ASSESSMENT — PATIENT HEALTH QUESTIONNAIRE - PHQ9: CLINICAL INTERPRETATION OF PHQ2 SCORE: 0

## 2024-05-07 ASSESSMENT — SOCIAL DETERMINANTS OF HEALTH (SDOH)
WITHIN THE PAST 12 MONTHS, YOU WORRIED THAT YOUR FOOD WOULD RUN OUT BEFORE YOU GOT THE MONEY TO BUY MORE: NEVER TRUE
IN A TYPICAL WEEK, HOW MANY TIMES DO YOU TALK ON THE PHONE WITH FAMILY, FRIENDS, OR NEIGHBORS?: THREE TIMES A WEEK
HOW HARD IS IT FOR YOU TO PAY FOR THE VERY BASICS LIKE FOOD, HOUSING, MEDICAL CARE, AND HEATING?: NOT HARD AT ALL
HOW OFTEN DO YOU HAVE A DRINK CONTAINING ALCOHOL: MONTHLY OR LESS
DO YOU BELONG TO ANY CLUBS OR ORGANIZATIONS SUCH AS CHURCH GROUPS UNIONS, FRATERNAL OR ATHLETIC GROUPS, OR SCHOOL GROUPS?: NO
IN A TYPICAL WEEK, HOW MANY TIMES DO YOU TALK ON THE PHONE WITH FAMILY, FRIENDS, OR NEIGHBORS?: THREE TIMES A WEEK
HOW OFTEN DO YOU ATTEND CHURCH OR RELIGIOUS SERVICES?: NEVER
ARE YOU MARRIED, WIDOWED, DIVORCED, SEPARATED, NEVER MARRIED, OR LIVING WITH A PARTNER?: NEVER MARRIED
DO YOU BELONG TO ANY CLUBS OR ORGANIZATIONS SUCH AS CHURCH GROUPS UNIONS, FRATERNAL OR ATHLETIC GROUPS, OR SCHOOL GROUPS?: NO
HOW MANY DRINKS CONTAINING ALCOHOL DO YOU HAVE ON A TYPICAL DAY WHEN YOU ARE DRINKING: 1 OR 2
HOW OFTEN DO YOU GET TOGETHER WITH FRIENDS OR RELATIVES?: THREE TIMES A WEEK
HOW OFTEN DO YOU GET TOGETHER WITH FRIENDS OR RELATIVES?: THREE TIMES A WEEK
HOW OFTEN DO YOU HAVE SIX OR MORE DRINKS ON ONE OCCASION: NEVER
ARE YOU MARRIED, WIDOWED, DIVORCED, SEPARATED, NEVER MARRIED, OR LIVING WITH A PARTNER?: NEVER MARRIED
HOW OFTEN DO YOU ATTEND CHURCH OR RELIGIOUS SERVICES?: NEVER
HOW OFTEN DO YOU ATTENT MEETINGS OF THE CLUB OR ORGANIZATION YOU BELONG TO?: PATIENT DECLINED
HOW OFTEN DO YOU ATTENT MEETINGS OF THE CLUB OR ORGANIZATION YOU BELONG TO?: PATIENT DECLINED

## 2024-05-07 ASSESSMENT — FIBROSIS 4 INDEX: FIB4 SCORE: 0.33

## 2024-05-07 NOTE — ED NOTES
Bedside report received from off going RN: Nitza, assumed care of patient.  POC discussed with patient. Call light within reach, all needs addressed at this time.       Fall risk interventions in place: Not Applicable (all applicable per [facility] Fall risk assessment)   Continuous monitoring: Pulse Ox or Blood Pressure  IVF/IV medications: Not Applicable   Oxygen: Room Air  Bedside sitter: Not Applicable   Isolation: Not Applicable

## 2024-05-07 NOTE — ED NOTES
Pt understands DC instructions and follow-up, DC paperwork provided, IV removed, pt ambulated with steady gait to [facility] for DC

## 2024-05-07 NOTE — PROGRESS NOTES
Subjective:     CC:  There were no encounter diagnoses.    HISTORY OF THE PRESENT ILLNESS: Patient is a 29 y.o. male. This pleasant patient is here today to establish care    LUQ  -starting 2-3 weeks ago, worse after eating, especially at night  -1 episode of nausea, vomiting with trace blood attributed to straining  -no specific food triggers  -denies changes in bowel movements, denies dark or bloody stools  -no prev hx of abd surgeries  -has tried NSAIDs, tylenol, Norco with slighty improvement with the norco  -tried reducing portion sizes, possibly attributed to fatty foods   -no personal or family hx of h pylori  -does endorse family hx of IBS but notes this was after his mother had chemo  -not associated with physical activity, position changes  -Requesting FMLA due to work missed 04/26 through 04/28    Chronic Sinusitis  -recurrent URIs  -not currently using antihistamines, nasal steroid sprays  -does use saline nasal spray    R knee pain  -locking sensation, especially with laying down   -no prev injury  -no swelling, discoloration  -no issues with ambulation  -no prev imaging    Health Maintenance     - Dyslipidemia (30-45): Ordered  - Diabetes (HTN, HLD, BMI >25): Ordered  - Depression screening (PHQ-2 and/or PHQ-9): neg  - Dental: due  - Eye: UTD  Diet: texture issues with vegetables - eats fruits, grains, meats, occ dairy  Exercise: active at work   Substance Use: denies  Tobacco Use/counseling: denies       Cancer screening  - Skin cancer screening: denies abnormal skin lesions, works night shift, avoids sun     Infectious disease screening/Immunizations  --STI/HIV Screening: Previously completed  --Hepatitis C Screening (18 to 80 yo): Ordered  --Immunizations: Hep A #1 given today      Current Outpatient Medications Ordered in Epic   Medication Sig Dispense Refill    albuterol 108 (90 Base) MCG/ACT Aero Soln inhalation aerosol Inhale 2 Puffs every 6 hours as needed for Shortness of Breath. 8.5 g 0     "ondansetron (ZOFRAN) 4 MG Tab tablet Take 1 Tablet by mouth every four hours as needed for Nausea/Vomiting. 15 Tablet 0    azithromycin (ZITHROMAX) 250 MG Tab Take 2 tablets by mouth on day one. Take one tablet by mouth the remaining days until gone 6 Tablet 0    benzonatate (TESSALON) 100 MG Cap Take 1 Capsule by mouth 3 times a day as needed for Cough. 20 Capsule 0     No current Epic-ordered facility-administered medications on file.       Health Maintenance: Completed    ROS:   Gen: no fevers/chills, no changes in weight  Eyes: no changes in vision  ENT: no sore throat, no hearing loss, no bloody nose  Pulm: no sob, no cough  CV: no chest pain, no palpitations  GI: no nausea/vomiting, no diarrhea  : no dysuria  MSk: no myalgias  Skin: no rash  Neuro: no headaches, no numbness/tingling  Heme/Lymph: no easy bruising      Objective:       Exam: /68 (BP Location: Right arm, Patient Position: Sitting, BP Cuff Size: Adult)   Pulse 95   Temp 36.1 °C (96.9 °F) (Temporal)   Resp 16   Ht 1.727 m (5' 8\")   Wt 112 kg (246 lb 14.6 oz)   SpO2 98%  Body mass index is 37.54 kg/m².    General: Normal appearing. No distress.  HEENT: Normocephalic. Eyes conjunctiva clear lids without ptosis, pupils equal and reactive to light accommodation, ears normal shape and contour, canals are clear bilaterally, tympanic membranes are benign, nasal mucosa benign, oropharynx is without erythema, edema or exudates.   Neck: Supple without JVD or bruit. Thyroid is not enlarged.  Pulmonary: Clear to ausculation.  Normal effort. No rales, ronchi, or wheezing.  Cardiovascular: Regular rate and rhythm without murmur. Carotid and radial pulses are intact and equal bilaterally.  Abdomen: Soft, nontender, nondistended. Normal bowel sounds. Liver and spleen are not palpable  Neurologic: Grossly nonfocal  Lymph: No cervical or supraclavicular lymph nodes are palpable  Skin: Warm and dry.  No obvious lesions.  Musculoskeletal: Normal gait. No " extremity cyanosis, clubbing, or edema.  Psych: Normal mood and affect. Alert and oriented x3. Judgment and insight is normal.      Assessment & Plan:   29 y.o. male with the following -    1. Encounter to establish care  Labs per orders  Vaccinations per orders  Screenings per orders      2. LUQ pain  Chronic condition, new to me  Reviewed ER records, lab results, imaging.  Patient questions addressed.  No significant findings on ER labs or CT abdomen pelvis.  I do suspect there may be a gastritis or even gastric ulcer component to patient's pain as it appears to be elicited/associated with eating.  Will proceed with H. pylori testing as well as trial of PPI medication.  Patient also been referred to gastroenterology for evaluation of possible endoscopy  - HELICOBACTER PYLORI BREATH TEST; Future  - Referral to Gastroenterology  - omeprazole (PRILOSEC) 20 MG delayed-release capsule; Take 1 Capsule by mouth every day for 14 days.  Dispense: 14 Capsule; Refill: 0  - CELIAC DISEASE AB SCREEN W/RFX    3. Epigastric burning sensation  - NM-CARDIAC TREADMILL ONLY-NO IMAGING; Future    4. Lipid screening  - Lipid Profile; Future    5. Screening for thyroid disorder  - TSH WITH REFLEX TO FT4; Future    6. Diabetes mellitus screening  - HEMOGLOBIN A1C; Future    7. Vitamin D deficiency  - VITAMIN D,25 HYDROXY (DEFICIENCY); Future    8. Need for hepatitis C screening test  - HEP C VIRUS ANTIBODY; Future    9. Need for vaccination  - Hep A Adult 19+      I spent a total of 35 minutes with record review, exam, communication with the patient, communication with other providers, and documentation of this encounter.    No follow-ups on file.    Please note that this dictation was created using voice recognition software. I have made every reasonable attempt to correct obvious errors, but I expect that there are errors of grammar and possibly content that I did not discover before finalizing the note.

## 2024-05-07 NOTE — ED PROVIDER NOTES
ED Provider Note    CHIEF COMPLAINT  Chief Complaint   Patient presents with    Abdominal Pain      Pt states that the pain radiates from mid upper epigastric area, to the left upper quadrant starting at 1300       EXTERNAL RECORDS REVIEWED  Outpatient Notes He was seen in urgent care 3/2024 for community acquired pneumonia    HPI/ROS  LIMITATION TO HISTORY   Select: : None  OUTSIDE HISTORIAN(S):  None    Jayden Nielsen is a 29 y.o. male who presents with epigastric and left upper quadrant abdominal pain.  He says he first noticed it about 11 days ago.  It has been intermittent since then.  It seems to be worse after he eats.  Today it awoke him from sleep and started to become more severe to the point that he could not wait to see his doctor tomorrow.  He does not have any associated nausea but he did have an episode of vomiting today due to the pain.  He says pain is not worse with palpation or movement.  He denies any diarrhea or constipation.  No fevers or chills.  No dysuria, hematuria or urgency.  Patient says pain is currently mild about a 2 out of 10 in severity.  Patient denies any chest pain or shortness of breath.  He denies any prior abdominal surgical history. He denies any symptoms of acid reflux.     PAST MEDICAL HISTORY   has a past medical history of Kidney stone and Kidney stone.    SURGICAL HISTORY  patient denies any surgical history    FAMILY HISTORY  No family history on file.    SOCIAL HISTORY  Social History     Tobacco Use    Smoking status: Never    Smokeless tobacco: Never   Vaping Use    Vaping Use: Never used   Substance and Sexual Activity    Alcohol use: Yes     Comment: socially, maybe 2 drinks a year    Drug use: No    Sexual activity: Not Currently       CURRENT MEDICATIONS  Home Medications       Reviewed by Aida Keenan R.N. (Registered Nurse) on 05/06/24 at 4668  Med List Status: Not Addressed     Medication Last Dose Status   albuterol 108 (90 Base) MCG/ACT Aero Soln  "inhalation aerosol  Active   azithromycin (ZITHROMAX) 250 MG Tab  Active   benzonatate (TESSALON) 100 MG Cap  Active   ondansetron (ZOFRAN) 4 MG Tab tablet  Active                    ALLERGIES  No Known Allergies    PHYSICAL EXAM  VITAL SIGNS: /81   Pulse 99   Temp 36.3 °C (97.3 °F) (Temporal)   Resp 18   Ht 1.727 m (5' 8\")   Wt 112 kg (247 lb 5.7 oz)   SpO2 98%   BMI 37.61 kg/m²    Constitutional: Awake and alert . Non toxic  HENT: Normal inspection  Moist mucous membranes  Eyes: Normal inspection  Neck: Grossly normal range of motion.  Cardiovascular: Normal heart rate, Normal rhythm.  Thorax & Lungs: No respiratory distress  Abdomen: Soft, non-distended, nontender to palpation in all 4 quadrants, no mass  Skin: No obvious rash.  Extremities: Warm, well perfused. No clubbing, cyanosis, edema   Neurologic: Grossly normal   Psychiatric: Normal for situation      EKG/LABS  Results for orders placed or performed during the hospital encounter of 05/06/24   CBC WITH DIFFERENTIAL   Result Value Ref Range    WBC 9.8 4.8 - 10.8 K/uL    RBC 5.61 4.70 - 6.10 M/uL    Hemoglobin 16.6 14.0 - 18.0 g/dL    Hematocrit 50.0 42.0 - 52.0 %    MCV 89.1 81.4 - 97.8 fL    MCH 29.6 27.0 - 33.0 pg    MCHC 33.2 32.3 - 36.5 g/dL    RDW 41.8 35.9 - 50.0 fL    Platelet Count 344 164 - 446 K/uL    MPV 9.3 9.0 - 12.9 fL    Neutrophils-Polys 73.40 (H) 44.00 - 72.00 %    Lymphocytes 18.80 (L) 22.00 - 41.00 %    Monocytes 6.00 0.00 - 13.40 %    Eosinophils 0.90 0.00 - 6.90 %    Basophils 0.60 0.00 - 1.80 %    Immature Granulocytes 0.30 0.00 - 0.90 %    Nucleated RBC 0.00 0.00 - 0.20 /100 WBC    Neutrophils (Absolute) 7.22 1.82 - 7.42 K/uL    Lymphs (Absolute) 1.85 1.00 - 4.80 K/uL    Monos (Absolute) 0.59 0.00 - 0.85 K/uL    Eos (Absolute) 0.09 0.00 - 0.51 K/uL    Baso (Absolute) 0.06 0.00 - 0.12 K/uL    Immature Granulocytes (abs) 0.03 0.00 - 0.11 K/uL    NRBC (Absolute) 0.00 K/uL   COMP METABOLIC PANEL   Result Value Ref Range    " Sodium 144 135 - 145 mmol/L    Potassium 3.6 3.6 - 5.5 mmol/L    Chloride 108 96 - 112 mmol/L    Co2 21 20 - 33 mmol/L    Anion Gap 15.0 7.0 - 16.0    Glucose 110 (H) 65 - 99 mg/dL    Bun 16 8 - 22 mg/dL    Creatinine 1.19 0.50 - 1.40 mg/dL    Calcium 9.2 8.5 - 10.5 mg/dL    Correct Calcium 8.7 8.5 - 10.5 mg/dL    AST(SGOT) 25 12 - 45 U/L    ALT(SGPT) 41 2 - 50 U/L    Alkaline Phosphatase 88 30 - 99 U/L    Total Bilirubin 0.7 0.1 - 1.5 mg/dL    Albumin 4.6 3.2 - 4.9 g/dL    Total Protein 7.2 6.0 - 8.2 g/dL    Globulin 2.6 1.9 - 3.5 g/dL    A-G Ratio 1.8 g/dL   LIPASE   Result Value Ref Range    Lipase 24 11 - 82 U/L   ESTIMATED GFR   Result Value Ref Range    GFR (CKD-EPI) 85 >60 mL/min/1.73 m 2   EKG   Result Value Ref Range    Report       [facility] Emergency Dept.    Test Date:  2024  Pt Name:    KELI WALTERS                Department: ER  MRN:        [id number]                      Room:  Gender:     Male                         Technician: [id number]  :        1995                   Requested By:ER TRIAGE PROTOCOL  Order #:    [id number]                    Reading MD: Haley Brooks    Measurements  Intervals                                Axis  Rate:       92                           P:          74  MI:         143                          QRS:        76  QRSD:       93                           T:          24  QT:         351  QTc:        435    Interpretive Statements  Sinus rhythm  No previous ECG available for comparison  Electronically Signed On 2024 18:59:56 PDT by Haley Broosk         I have independently interpreted this EKG    RADIOLOGY/PROCEDURES   I have independently interpreted the diagnostic imaging associated with this visit and am waiting the final reading from the radiologist.   My preliminary interpretation is as follows:  No large bowel obstruction    Radiologist interpretation:  CT-ABDOMEN-PELVIS WITH   Final Result      1. Hepatic steatosis.   2. The  remainder of the CT of the abdomen and pelvis with IV contrast is unremarkable.          COURSE & MEDICAL DECISION MAKING    ASSESSMENT, COURSE AND PLAN  Care Narrative: This is a healthy 29 yr old male who presents with epigastric and LUQ pain, intermittent for about 10 days.  He arrives afebrile, mildly tachycardic but is overall well appearing.  He has a benign abdominal exam without tenderness, no rebound or guarding.   Labs are overall reassuring, he has no leukocytosis, he has normal renal function, normal liver function testing.  His lipase is normal and he does not have pancreatitis. He has no RUQ pain, normal LFTs and I doubt acute cholecystitis or other obstructive biliary process.  CT obtained without findings of colitis, bowel obstruction, mass or other focal inflammatory or surgical process.  He does have hepatic steatosis, he was informed of this finding and will follow-up as an outpatient   Unclear exactly the etiology of the patient's pain.  He declined pain medication in the ER. I stressed the importance of close outpatient follow-up, especially in the setting of diagnostic uncertainty, and he expresses understanding.  He continues to look well, normal vitals and repeat assessment benign and he was discharged home in good condition.         DISPOSITION AND DISCUSSIONS  I have discussed management of the patient with the following physicians and BEN's:  None    Discussion of management with other \[facility]\"" or appropriate source(s): None     Escalation of care considered, and ultimately not performed:IV fluids, he is able to tolerate PO    Barriers to care at this time, including but not limited to:  None .     Decision tools and prescription drugs considered including, but not limited to: Pain Medications non opiate over the counter medications .    FINAL DIAGNOSIS  1. Left upper quadrant abdominal pain Acute          Electronically signed by: Haley Brooks M.D., 5/6/2024 6:43 PM

## 2024-05-07 NOTE — ED TRIAGE NOTES
"Chief Complaint   Patient presents with    Abdominal Pain      Pt states that the pain radiates from mid upper epigastric area, to the left upper quadrant starting at 1300     /88   Pulse (!) 109   Temp 36.3 °C (97.3 °F) (Temporal)   Resp 18   Ht 1.727 m (5' 8\")   Wt 112 kg (247 lb 5.7 oz)   SpO2 99%   BMI 37.61 kg/m²     Pt states that he has had episodes similar in the past but has not been seen for them yet. Pt educated on triage process and thanked for patience.     "

## 2024-05-13 ENCOUNTER — APPOINTMENT (OUTPATIENT)
Dept: RADIOLOGY | Facility: MEDICAL CENTER | Age: 29
End: 2024-05-13
Attending: EMERGENCY MEDICINE
Payer: COMMERCIAL

## 2024-05-13 ENCOUNTER — HOSPITAL ENCOUNTER (EMERGENCY)
Facility: MEDICAL CENTER | Age: 29
End: 2024-05-13
Attending: EMERGENCY MEDICINE
Payer: COMMERCIAL

## 2024-05-13 ENCOUNTER — PHARMACY VISIT (OUTPATIENT)
Dept: PHARMACY | Facility: MEDICAL CENTER | Age: 29
End: 2024-05-13
Payer: COMMERCIAL

## 2024-05-13 VITALS
RESPIRATION RATE: 16 BRPM | DIASTOLIC BLOOD PRESSURE: 74 MMHG | OXYGEN SATURATION: 100 % | TEMPERATURE: 97.6 F | HEART RATE: 57 BPM | SYSTOLIC BLOOD PRESSURE: 130 MMHG

## 2024-05-13 DIAGNOSIS — E86.0 DEHYDRATION: ICD-10-CM

## 2024-05-13 DIAGNOSIS — R10.13 EPIGASTRIC ABDOMINAL PAIN: ICD-10-CM

## 2024-05-13 LAB
ALBUMIN SERPL BCP-MCNC: 4 G/DL (ref 3.2–4.9)
ALBUMIN/GLOB SERPL: 1.3 G/DL
ALP SERPL-CCNC: 80 U/L (ref 30–99)
ALT SERPL-CCNC: 30 U/L (ref 2–50)
ANION GAP SERPL CALC-SCNC: 18 MMOL/L (ref 7–16)
AST SERPL-CCNC: 34 U/L (ref 12–45)
BASOPHILS # BLD AUTO: 0.4 % (ref 0–1.8)
BASOPHILS # BLD: 0.05 K/UL (ref 0–0.12)
BILIRUB SERPL-MCNC: 0.4 MG/DL (ref 0.1–1.5)
BUN SERPL-MCNC: 10 MG/DL (ref 8–22)
CALCIUM ALBUM COR SERPL-MCNC: 9 MG/DL (ref 8.5–10.5)
CALCIUM SERPL-MCNC: 9 MG/DL (ref 8.5–10.5)
CHLORIDE SERPL-SCNC: 109 MMOL/L (ref 96–112)
CO2 SERPL-SCNC: 14 MMOL/L (ref 20–33)
CREAT SERPL-MCNC: 0.87 MG/DL (ref 0.5–1.4)
EKG IMPRESSION: NORMAL
EOSINOPHIL # BLD AUTO: 0.31 K/UL (ref 0–0.51)
EOSINOPHIL NFR BLD: 2.4 % (ref 0–6.9)
ERYTHROCYTE [DISTWIDTH] IN BLOOD BY AUTOMATED COUNT: 40.4 FL (ref 35.9–50)
GFR SERPLBLD CREATININE-BSD FMLA CKD-EPI: 120 ML/MIN/1.73 M 2
GLOBULIN SER CALC-MCNC: 3.1 G/DL (ref 1.9–3.5)
GLUCOSE SERPL-MCNC: 182 MG/DL (ref 65–99)
HCT VFR BLD AUTO: 46.5 % (ref 42–52)
HGB BLD-MCNC: 15.9 G/DL (ref 14–18)
IMM GRANULOCYTES # BLD AUTO: 0.08 K/UL (ref 0–0.11)
IMM GRANULOCYTES NFR BLD AUTO: 0.6 % (ref 0–0.9)
LIPASE SERPL-CCNC: 29 U/L (ref 11–82)
LYMPHOCYTES # BLD AUTO: 2.06 K/UL (ref 1–4.8)
LYMPHOCYTES NFR BLD: 16.1 % (ref 22–41)
MCH RBC QN AUTO: 29.9 PG (ref 27–33)
MCHC RBC AUTO-ENTMCNC: 34.2 G/DL (ref 32.3–36.5)
MCV RBC AUTO: 87.6 FL (ref 81.4–97.8)
MONOCYTES # BLD AUTO: 0.59 K/UL (ref 0–0.85)
MONOCYTES NFR BLD AUTO: 4.6 % (ref 0–13.4)
NEUTROPHILS # BLD AUTO: 9.72 K/UL (ref 1.82–7.42)
NEUTROPHILS NFR BLD: 75.9 % (ref 44–72)
NRBC # BLD AUTO: 0 K/UL
NRBC BLD-RTO: 0 /100 WBC (ref 0–0.2)
PLATELET # BLD AUTO: 354 K/UL (ref 164–446)
PMV BLD AUTO: 10 FL (ref 9–12.9)
POTASSIUM SERPL-SCNC: 3.6 MMOL/L (ref 3.6–5.5)
PROT SERPL-MCNC: 7.1 G/DL (ref 6–8.2)
RBC # BLD AUTO: 5.31 M/UL (ref 4.7–6.1)
SODIUM SERPL-SCNC: 141 MMOL/L (ref 135–145)
WBC # BLD AUTO: 12.8 K/UL (ref 4.8–10.8)

## 2024-05-13 PROCEDURE — RXMED WILLOW AMBULATORY MEDICATION CHARGE: Performed by: EMERGENCY MEDICINE

## 2024-05-13 RX ORDER — HYDROCODONE BITARTRATE AND ACETAMINOPHEN 5; 325 MG/1; MG/1
1 TABLET ORAL EVERY 4 HOURS PRN
Qty: 8 TABLET | Refills: 0 | Status: SHIPPED | OUTPATIENT
Start: 2024-05-13 | End: 2024-05-16

## 2024-05-13 RX ORDER — HYDROMORPHONE HYDROCHLORIDE 1 MG/ML
0.5 INJECTION, SOLUTION INTRAMUSCULAR; INTRAVENOUS; SUBCUTANEOUS ONCE
Status: COMPLETED | OUTPATIENT
Start: 2024-05-13 | End: 2024-05-13

## 2024-05-13 RX ORDER — SUCRALFATE ORAL 1 G/10ML
1 SUSPENSION ORAL EVERY 6 HOURS PRN
Qty: 420 ML | Refills: 0 | Status: SHIPPED | OUTPATIENT
Start: 2024-05-13

## 2024-05-13 RX ORDER — HALOPERIDOL 5 MG/ML
2.5 INJECTION INTRAMUSCULAR ONCE
Status: COMPLETED | OUTPATIENT
Start: 2024-05-13 | End: 2024-05-13

## 2024-05-13 RX ORDER — OMEPRAZOLE 40 MG/1
40 CAPSULE, DELAYED RELEASE ORAL DAILY
Qty: 30 CAPSULE | Refills: 3 | Status: SHIPPED | OUTPATIENT
Start: 2024-05-13

## 2024-05-13 RX ORDER — SODIUM CHLORIDE 9 MG/ML
1000 INJECTION, SOLUTION INTRAVENOUS ONCE
Status: COMPLETED | OUTPATIENT
Start: 2024-05-13 | End: 2024-05-13

## 2024-05-13 RX ORDER — HYDROMORPHONE HYDROCHLORIDE 1 MG/ML
1 INJECTION, SOLUTION INTRAMUSCULAR; INTRAVENOUS; SUBCUTANEOUS ONCE
Status: COMPLETED | OUTPATIENT
Start: 2024-05-13 | End: 2024-05-13

## 2024-05-13 RX ADMIN — SODIUM CHLORIDE 1000 ML: 9 INJECTION, SOLUTION INTRAVENOUS at 16:49

## 2024-05-13 RX ADMIN — HALOPERIDOL LACTATE 2.5 MG: 5 INJECTION, SOLUTION INTRAMUSCULAR at 18:05

## 2024-05-13 RX ADMIN — HYDROMORPHONE HYDROCHLORIDE 0.5 MG: 1 INJECTION, SOLUTION INTRAMUSCULAR; INTRAVENOUS; SUBCUTANEOUS at 16:27

## 2024-05-13 RX ADMIN — HYDROMORPHONE HYDROCHLORIDE 1 MG: 1 INJECTION, SOLUTION INTRAMUSCULAR; INTRAVENOUS; SUBCUTANEOUS at 19:19

## 2024-05-13 RX ADMIN — SODIUM CHLORIDE 1000 ML: 9 INJECTION, SOLUTION INTRAVENOUS at 16:48

## 2024-05-13 RX ADMIN — LIDOCAINE HYDROCHLORIDE 30 ML: 20 SOLUTION ORAL; TOPICAL at 16:29

## 2024-05-13 RX ADMIN — FAMOTIDINE 20 MG: 10 INJECTION, SOLUTION INTRAVENOUS at 16:30

## 2024-05-13 ASSESSMENT — PAIN DESCRIPTION - PAIN TYPE: TYPE: ACUTE PAIN

## 2024-05-13 NOTE — ED PROVIDER NOTES
ED Provider Note    CHIEF COMPLAINT  Chief Complaint   Patient presents with    Abdominal Pain    Epigastric Pain            EXTERNAL RECORDS REVIEWED  Seen here last week 5/6/24 with left upper quadrant abdominal pain. Labs and CT were unremarkable. 5/7/24 seen by new PCP. He was started on omeprazole and had H pylori test and GI referral placed.    HPI/ROS  LIMITATION TO HISTORY   Select: : None  OUTSIDE HISTORIAN(S):  None    Jayden Stanford Nielsen is a 29 y.o. male who presents to the Emergency Department with ongoing epigastric abdominal pain onset three weeks ago, but acutely worsening today.  Pain is mainly in the epigastric and left upper quadrant area, increased with deep inspiration he has a recent history of epigastric pain, and has been seen here and by PCP for them. He was seen one week ago for the pain, and since then he was doing mildly better. Today he began enduring acute associated nausea and vomiting.  He has not started taking omeprazole by his PCP as he was supposed to have an H. pylori test today.  He has been using Zofran at home as needed.  He thinks he may have aspirated as well. He has no prior abdominal surgeries and does not drink alcohol or use marijuana. He denies any other alleviating methods. He takes no daily medications. At this time he rates his pain at a 7/10 following Fentanyl administration by EMS    PAST MEDICAL HISTORY  Past Medical History:   Diagnosis Date    Asthma     Kidney stone     Kidney stone         SURGICAL HISTORY  History reviewed. No pertinent surgical history.     FAMILY HISTORY  Family History   Problem Relation Age of Onset    Cancer Mother         ?breast       SOCIAL HISTORY   reports that he has never smoked. He has never used smokeless tobacco. He reports that he does not currently use alcohol. He reports that he does not use drugs.    CURRENT MEDICATIONS  Previous Medications    ALBUTEROL 108 (90 BASE) MCG/ACT AERO SOLN INHALATION AEROSOL    Inhale 2 Puffs  every 6 hours as needed for Shortness of Breath.    OMEPRAZOLE (PRILOSEC) 20 MG DELAYED-RELEASE CAPSULE    Take 1 Capsule by mouth every day for 14 days.    ONDANSETRON (ZOFRAN) 4 MG TAB TABLET    Take 1 Tablet by mouth every four hours as needed for Nausea/Vomiting.       ALLERGIES  Patient has no known allergies.    PHYSICAL EXAM  /80   Pulse 75   Temp 36.6 °C (97.8 °F) (Temporal)   Resp 20   SpO2 96%    Constitutional: Pale, uncomfortable in mild distress.  Nontoxic appearing  HENT: Normocephalic, Atraumatic. Bilateral external ears normal. Nose normal.  Moist mucous membranes.  Oropharynx clear.  Eyes: Pupils are equal and reactive. Conjunctiva normal.   Neck: Supple, full range of motion  Heart: Regular rate and rhythm.  No murmurs.    Lungs: No respiratory distress, normal work of breathing. Lungs clear to auscultation bilaterally.  Abdomen Soft, no distention.  Epigastric and LUQ tenderness to palpation  Musculoskeletal: Atraumatic. No obvious deformities noted.  No lower extremity edema.  Skin: Warm, Dry.  No erythema, No rash.   Neurologic: Alert and oriented x3. Moving all extremities spontaneously without focal deficits.  Psychiatric: Affect normal, Mood normal, Appears appropriate and not intoxicated.      DIAGNOSTIC STUDIES / PROCEDURES    EKG  I have independently interpreted this EKG  Results for orders placed or performed during the hospital encounter of 24   EKG   Result Value Ref Range    Report       [facility] Emergency Dept.    Test Date:  2024  Pt Name:    KELI WALTERS                Department: ER  MRN:        [id number]                      Room:       [facility]  Gender:     Male                         Technician: [id number]  :        1995                   Requested By:ER TRIAGE PROTOCOL  Order #:    [id number]                    Osmany MD: Sue Brooks MD    Measurements  Intervals                                Axis  Rate:       59                            P:          17  ND:         156                          QRS:        64  QRSD:       102                          T:          25  QT:         431  QTc:        427    Interpretive Statements  Sinus bradycardia  Normal intervals, no ectopy  No ST or T wave change  Compared to ECG 05/06/2024 17:21:43  Sinus rhythm no longer present  Electronically Signed On 05- 16:12:25 PDT by Sue Brooks MD         LABS  Labs Reviewed   CBC WITH DIFFERENTIAL - Abnormal; Notable for the following components:       Result Value    WBC 12.8 (*)     Neutrophils-Polys 75.90 (*)     Lymphocytes 16.10 (*)     Neutrophils (Absolute) 9.72 (*)     All other components within normal limits   COMP METABOLIC PANEL - Abnormal; Notable for the following components:    Co2 14 (*)     Anion Gap 18.0 (*)     Glucose 182 (*)     All other components within normal limits   LIPASE   ESTIMATED GFR         RADIOLOGY  I have independently interpreted the diagnostic imaging associated with this visit and am waiting the final reading from the radiologist.   My preliminary interpretation is as follows: no infiltrate    Radiologist interpretation:  DX-CHEST-PORTABLE (1 VIEW)   Final Result      No evidence of acute cardiopulmonary process.            COURSE & MEDICAL DECISION MAKING    3:58 PM - Patient seen and examined at bedside. Discussed plan of care, including labs and imaging to rule out concerning etiology of disease. Patient agrees to the plan of care. The patient will be resuscitated with 1L NS IV and medicated with GI cocktail, pepcid 20 mg, and Dilaudid 0.5 mg. Ordered for DX-chest, urine drug screen, CBC with diff, CMP, Estimated GFR, Lipase, and UA to evaluate his symptoms.       ASSESSMENT, COURSE AND PLAN  Care Narrative: Patient with recent history of ongoing epigastric and left upper quadrant pain as well as vomiting.  He was seen here 1 week ago with unremarkable labs and a CT scan.  He is afebrile with normal vital signs  on arrival however is very pale and uncomfortable appearing.  EKG shows sinus bradycardia without evidence of ischemia or arrhythmia.  Labs show mild leukocytosis of 12.  He has no renal dysfunction or electrolyte abnormality however does have acidosis that may be related to dehydration.  Blood sugar is only minimally elevated at 182, do not feel this is consistent with DKA.  I did consider repeat imaging however the patient just recently had a normal CT scan 1 week ago.  Chest x-ray was performed without any signs of infiltrate or aspiration as well as no evidence of free air to suggest perforated ulcer. He does not have any tenderness over his gallbladder to suggest cholecystitis and no evidence of transaminitis or elevated lipase concerning for pancreatitis.  He is presenting somewhat like cannabis hyperemesis syndrome however vehemently denies any marijuana abuse.  He was unable to provide a urine sample for us throughout his stay despite receiving 2 L of fluid.  He does not have any urinary symptoms to suggest infection or stone.  I suspect that symptoms are likely due to ongoing gastritis or peptic ulcer disease.    Upon reassessment, patient is resting comfortably with normal vital signs.  No new complaints at this time.  He has some improvement of his pain although still is ongoing.  He was able to tolerate water without difficulty.  Discussed results with patient and/or family as well as importance of primary care follow up.  Patient understands plan of care and strict return precautions for new or changing symptoms    ADDITIONAL PROBLEM LIST  Problem #1: Epigastric abdominal pain -discharged with omeprazole, Carafate, short course of pain medication, discussed diet recommendation and refraining from alcohol or marijuana    Problem #2: Dehydration -given IV fluids      DISPOSITION AND DISCUSSIONS    Escalation of care considered, and ultimately not performed:diagnostic imaging and acute inpatient care  management, however at this time, the patient is most appropriate for outpatient management      The patient will return for new or worsening symptoms and is stable at the time of discharge.    The patient is referred to a primary physician for blood pressure management, diabetic screening, and for all other preventative health concerns.    DISPOSITION:  Patient will be discharged home in stable condition.    FOLLOW UP:  Susan Obando P.A.-C.  [address]  [facility] [id number] [phone]    Schedule an appointment as soon as possible for a visit       [facility], Emergency Dept  [address] [facility] [id number] [phone]    If symptoms worsen      OUTPATIENT MEDICATIONS:  Discharge Medication List as of 5/13/2024  9:55 PM        START taking these medications    Details   !! omeprazole (PRILOSEC) 40 MG delayed-release capsule Take 1 Capsule by mouth every day., Disp-30 Capsule, R-3, Normal      sucralfate (CARAFATE) 1 GM/10ML Suspension Take 10 mL by mouth every 6 hours as needed (abdominal pain)., Disp-414 mL, R-0, Normal      HYDROcodone-acetaminophen (NORCO) 5-325 MG Tab per tablet Take 1 Tablet by mouth every four hours as needed (severe pain) for up to 3 days., Disp-8 Tablet, R-0, Normal       !! - Potential duplicate medications found. Please discuss with provider.          FINAL DIAGNOSIS  1. Epigastric abdominal pain    2. Dehydration      In prescribing controlled substances to this patient, I certify that I have obtained and reviewed the medical history of Jayden Nielsen. I have also made a good nader effort to obtain applicable records from other providers who have treated the patient and records did not demonstrate any increased risk of substance abuse that would prevent me from prescribing controlled substances.     I have conducted a physical exam and documented it. I have reviewed Mr. Nielsen’s prescription history as maintained by the [place]  Prescription Monitoring Program.     I have assessed the patient’s risk for abuse, dependency, and addiction using the validated Opioid Risk Tool available at https://www.mdcalc.com/zbybfk-fcvb-fwzq-ort-narcotic-abuse.     Given the above, I believe the benefits of controlled substance therapy outweigh the risks. The reasons for prescribing controlled substances include non-narcotic, oral analgesic alternatives have been inadequate for pain control. Accordingly, I have discussed the risk and benefits, treatment plan, and alternative therapies with the patient.         The note accurately reflects work and decisions made by me.  Sue Brooks M.D.  5/13/2024  10:29 PM     Lucia SADLER (Benji), am scribing for, and in the presence of, Sue Brooks M.D..    Electronically signed by: Lucia Richardson (Benji), 5/13/2024    Sue SADLER M.D. personally performed the services described in this documentation, as scribed by Lucia Richardson in my presence, and it is both accurate and complete.

## 2024-05-13 NOTE — ED TRIAGE NOTES
"Pt comes in complaining of epigastric pain starting when he woke up today. Pt given ASA and fentanyl PTA. Pt stating N/V. Worse with inspiration. Pt stating \"I potentially aspirated when I vomited\"  "

## 2024-05-14 NOTE — DISCHARGE INSTRUCTIONS
You were seen in the Emergency Department for abdominal pain.    EKG, labs, chest xray were completed without significant acute abnormalities other than signs of dehydration.    Please use 1,000mg of tylenol every 6 hours as needed for pain.  Take omeprazole daily, use Carafate as needed for pain.  Eat a bland diet without spicy or acidic foods.  Avoid alcohol and marijuana.  Avoid NSAIDs such as ibuprofen Motrin, Aleve    Please follow up with your primary care physician.    Return to the Emergency Department with uncontrolled pain, persistent vomiting, or other concerns.

## 2024-05-14 NOTE — ED NOTES
Pt understands DC instructions and followup, DC paperwork provided, IV removed, pt ambulated with steady gait to [facility] canales for DC

## 2024-05-20 ENCOUNTER — OFFICE VISIT (OUTPATIENT)
Dept: MEDICAL GROUP | Facility: LAB | Age: 29
End: 2024-05-20
Payer: COMMERCIAL

## 2024-05-20 VITALS
HEART RATE: 100 BPM | TEMPERATURE: 97.3 F | HEIGHT: 68 IN | BODY MASS INDEX: 36.62 KG/M2 | DIASTOLIC BLOOD PRESSURE: 70 MMHG | RESPIRATION RATE: 18 BRPM | OXYGEN SATURATION: 96 % | SYSTOLIC BLOOD PRESSURE: 128 MMHG | WEIGHT: 241.62 LBS

## 2024-05-20 DIAGNOSIS — R10.13 EPIGASTRIC BURNING SENSATION: ICD-10-CM

## 2024-05-20 PROCEDURE — 3078F DIAST BP <80 MM HG: CPT | Performed by: PHYSICIAN ASSISTANT

## 2024-05-20 PROCEDURE — 3074F SYST BP LT 130 MM HG: CPT | Performed by: PHYSICIAN ASSISTANT

## 2024-05-20 PROCEDURE — 99213 OFFICE O/P EST LOW 20 MIN: CPT | Performed by: PHYSICIAN ASSISTANT

## 2024-05-20 RX ORDER — DICYCLOMINE HYDROCHLORIDE 10 MG/1
10 CAPSULE ORAL
Qty: 120 CAPSULE | Refills: 0 | Status: SHIPPED | OUTPATIENT
Start: 2024-05-20 | End: 2024-05-31 | Stop reason: SDUPTHER

## 2024-05-20 ASSESSMENT — FIBROSIS 4 INDEX: FIB4 SCORE: 0.51

## 2024-05-20 NOTE — PROGRESS NOTES
"Subjective:     CC: f/u ER, epigastric pain    HPI:   Jayden here today with     Evaluated in ER 05/13/2024 for epigastric pain.  Labs including CBC, CMP, lipase within normal limits.  Chest x-ray within normal limits.  CT abdomen pelvis was completed 05/06/2024 with no acute findings.  Discharged treated for dehydration.  Medications as directed.  With rx for omeprazole, zofran, sucralfate, which pt has found helpful. He has been taking He has been following a bland diet.  Patient notes he still having some bloating and stomach cramping.  Denies dark or bloody stools.  Denies vomiting or bloody emesis.    Planning to contact GI to schedule appt for evaluation    Needs FMLA covered 05/11/24-05/12 and 05/17/05/19/2024    Does endorse family hx of IBS/gastritis    ROS:  All ROS negative except for pertinent positives listed above.       Current Outpatient Medications Ordered in Epic   Medication Sig Dispense Refill    dicyclomine (BENTYL) 10 MG Cap Take 1 Capsule by mouth 4 Times a Day,Before Meals and at Bedtime. 120 Capsule 0    omeprazole (PRILOSEC) 40 MG delayed-release capsule Take 1 Capsule by mouth every day. 30 Capsule 3    sucralfate (CARAFATE) 1 GM/10ML Suspension Take 10 mL by mouth every 6 hours as needed (abdominal pain). 420 mL 0    albuterol 108 (90 Base) MCG/ACT Aero Soln inhalation aerosol Inhale 2 Puffs every 6 hours as needed for Shortness of Breath. 8.5 g 0    ondansetron (ZOFRAN) 4 MG Tab tablet Take 1 Tablet by mouth every four hours as needed for Nausea/Vomiting. 15 Tablet 0    omeprazole (PRILOSEC) 20 MG delayed-release capsule Take 1 Capsule by mouth every day for 14 days. (Patient not taking: Reported on 5/20/2024) 14 Capsule 0     No current Epic-ordered facility-administered medications on file.         Objective:     Exam:  /70 (BP Location: Left arm, Patient Position: Sitting, BP Cuff Size: Adult)   Pulse 100   Temp 36.3 °C (97.3 °F) (Temporal)   Resp 18   Ht 1.727 m (5' 8\")   " Wt 110 kg (241 lb 10 oz)   SpO2 96%   BMI 36.74 kg/m²  Body mass index is 36.74 kg/m².    Gen: Alert and oriented, No apparent distress.  Neck: Neck is supple without lymphadenopathy.  Lungs: Normal effort, CTA bilaterally, no wheezes, rhonchi, or rales  CV: Regular rate and rhythm. No murmurs, rubs, or gallops.  Ext: No clubbing, cyanosis, edema.  ABD: Soft, nontender, nondistended, bowel sounds present in all 4 quadrants, Liu sign negative, no palpable masses, no rebound tenderness      Assessment & Plan:     29 y.o. male with the following -     1. Epigastric burning sensation  Chronic condition  ER records reviewed with patient.  Labs and imaging also reviewed.  Patient questions addressed.  Patient was previously referred to gastroenterology, this referral has been approved he will contact them to get an appointment set up.  Plan to continue with PPI, Zofran, sucralfate for management of symptoms as patient has seen some improvement with initiation of these.  Will also trial dicyclomine 10 mg 4 times daily as needed.  Patient does need his FMLA for work updated.  This form was completed today and returned to the patient.  Will proceed with H. pylori stool testing for further evaluation.  Discussed red flags and indications for close follow-up  - H.PYLORI STOOL ANTIGEN; Future  - dicyclomine (BENTYL) 10 MG Cap; Take 1 Capsule by mouth 4 Times a Day,Before Meals and at Bedtime.  Dispense: 120 Capsule; Refill: 0        I spent a total of 25 minutes with record review, exam, communication with the patient, communication with other providers, and documentation of this encounter.      No follow-ups on file.    Please note that this dictation was created using voice recognition software. I have made every reasonable attempt to correct obvious errors, but there may be errors of grammar and possibly content that I did not discover before finalizing the note.

## 2024-05-25 ENCOUNTER — APPOINTMENT (OUTPATIENT)
Dept: RADIOLOGY | Facility: MEDICAL CENTER | Age: 29
End: 2024-05-25
Attending: STUDENT IN AN ORGANIZED HEALTH CARE EDUCATION/TRAINING PROGRAM
Payer: COMMERCIAL

## 2024-05-25 ENCOUNTER — ANESTHESIA EVENT (OUTPATIENT)
Dept: SURGERY | Facility: MEDICAL CENTER | Age: 29
End: 2024-05-25
Payer: COMMERCIAL

## 2024-05-25 ENCOUNTER — HOSPITAL ENCOUNTER (INPATIENT)
Facility: MEDICAL CENTER | Age: 29
LOS: 3 days | End: 2024-05-29
Attending: STUDENT IN AN ORGANIZED HEALTH CARE EDUCATION/TRAINING PROGRAM | Admitting: SURGERY
Payer: COMMERCIAL

## 2024-05-25 ENCOUNTER — ANESTHESIA (OUTPATIENT)
Dept: SURGERY | Facility: MEDICAL CENTER | Age: 29
End: 2024-05-25
Payer: COMMERCIAL

## 2024-05-25 DIAGNOSIS — K81.0 ACUTE GANGRENOUS CHOLECYSTITIS: ICD-10-CM

## 2024-05-25 DIAGNOSIS — K83.8 POSTOPERATIVE BILE LEAK: ICD-10-CM

## 2024-05-25 DIAGNOSIS — K81.0 ACUTE CHOLECYSTITIS: ICD-10-CM

## 2024-05-25 DIAGNOSIS — K91.89 POSTOPERATIVE BILE LEAK: ICD-10-CM

## 2024-05-25 LAB
ALBUMIN SERPL BCP-MCNC: 3.8 G/DL (ref 3.2–4.9)
ALBUMIN/GLOB SERPL: 1.1 G/DL
ALP SERPL-CCNC: 129 U/L (ref 30–99)
ALT SERPL-CCNC: 142 U/L (ref 2–50)
ANION GAP SERPL CALC-SCNC: 16 MMOL/L (ref 7–16)
APPEARANCE UR: CLEAR
AST SERPL-CCNC: 201 U/L (ref 12–45)
BACTERIA #/AREA URNS HPF: NEGATIVE /HPF
BASOPHILS # BLD AUTO: 0.4 % (ref 0–1.8)
BASOPHILS # BLD: 0.05 K/UL (ref 0–0.12)
BILIRUB SERPL-MCNC: 1.3 MG/DL (ref 0.1–1.5)
BILIRUB UR QL STRIP.AUTO: NEGATIVE
BUN SERPL-MCNC: 9 MG/DL (ref 8–22)
CALCIUM ALBUM COR SERPL-MCNC: 9.2 MG/DL (ref 8.5–10.5)
CALCIUM SERPL-MCNC: 9 MG/DL (ref 8.5–10.5)
CHLORIDE SERPL-SCNC: 102 MMOL/L (ref 96–112)
CO2 SERPL-SCNC: 21 MMOL/L (ref 20–33)
COLOR UR: ABNORMAL
CREAT SERPL-MCNC: 0.84 MG/DL (ref 0.5–1.4)
EKG IMPRESSION: NORMAL
EOSINOPHIL # BLD AUTO: 0.06 K/UL (ref 0–0.51)
EOSINOPHIL NFR BLD: 0.5 % (ref 0–6.9)
EPI CELLS #/AREA URNS HPF: NEGATIVE /HPF
ERYTHROCYTE [DISTWIDTH] IN BLOOD BY AUTOMATED COUNT: 42 FL (ref 35.9–50)
FUNGUS SPEC FUNGUS STN: NORMAL
GFR SERPLBLD CREATININE-BSD FMLA CKD-EPI: 121 ML/MIN/1.73 M 2
GLOBULIN SER CALC-MCNC: 3.4 G/DL (ref 1.9–3.5)
GLUCOSE SERPL-MCNC: 100 MG/DL (ref 65–99)
GLUCOSE UR STRIP.AUTO-MCNC: NEGATIVE MG/DL
GRAM STN SPEC: NORMAL
HCT VFR BLD AUTO: 47.7 % (ref 42–52)
HGB BLD-MCNC: 15.8 G/DL (ref 14–18)
HYALINE CASTS #/AREA URNS LPF: ABNORMAL /LPF
IMM GRANULOCYTES # BLD AUTO: 0.16 K/UL (ref 0–0.11)
IMM GRANULOCYTES NFR BLD AUTO: 1.4 % (ref 0–0.9)
KETONES UR STRIP.AUTO-MCNC: ABNORMAL MG/DL
LEUKOCYTE ESTERASE UR QL STRIP.AUTO: NEGATIVE
LIPASE SERPL-CCNC: 46 U/L (ref 11–82)
LYMPHOCYTES # BLD AUTO: 2.72 K/UL (ref 1–4.8)
LYMPHOCYTES NFR BLD: 23.5 % (ref 22–41)
MCH RBC QN AUTO: 29.6 PG (ref 27–33)
MCHC RBC AUTO-ENTMCNC: 33.1 G/DL (ref 32.3–36.5)
MCV RBC AUTO: 89.5 FL (ref 81.4–97.8)
MICRO URNS: ABNORMAL
MONOCYTES # BLD AUTO: 0.59 K/UL (ref 0–0.85)
MONOCYTES NFR BLD AUTO: 5.1 % (ref 0–13.4)
MUCOUS THREADS #/AREA URNS HPF: ABNORMAL /HPF
NEUTROPHILS # BLD AUTO: 8 K/UL (ref 1.82–7.42)
NEUTROPHILS NFR BLD: 69.1 % (ref 44–72)
NITRITE UR QL STRIP.AUTO: NEGATIVE
NRBC # BLD AUTO: 0 K/UL
NRBC BLD-RTO: 0 /100 WBC (ref 0–0.2)
PH UR STRIP.AUTO: 6 [PH] (ref 5–8)
PLATELET # BLD AUTO: 637 K/UL (ref 164–446)
PMV BLD AUTO: 9 FL (ref 9–12.9)
POTASSIUM SERPL-SCNC: 3.8 MMOL/L (ref 3.6–5.5)
PROT SERPL-MCNC: 7.2 G/DL (ref 6–8.2)
PROT UR QL STRIP: 30 MG/DL
RBC # BLD AUTO: 5.33 M/UL (ref 4.7–6.1)
RBC # URNS HPF: ABNORMAL /HPF
RBC UR QL AUTO: ABNORMAL
SIGNIFICANT IND 70042: NORMAL
SIGNIFICANT IND 70042: NORMAL
SITE SITE: NORMAL
SITE SITE: NORMAL
SODIUM SERPL-SCNC: 139 MMOL/L (ref 135–145)
SOURCE SOURCE: NORMAL
SOURCE SOURCE: NORMAL
SP GR UR STRIP.AUTO: >=1.045
UROBILINOGEN UR STRIP.AUTO-MCNC: 1 MG/DL
WBC # BLD AUTO: 11.6 K/UL (ref 4.8–10.8)
WBC #/AREA URNS HPF: ABNORMAL /HPF

## 2024-05-25 PROCEDURE — 80053 COMPREHEN METABOLIC PANEL: CPT

## 2024-05-25 PROCEDURE — 96376 TX/PRO/DX INJ SAME DRUG ADON: CPT

## 2024-05-25 PROCEDURE — G0378 HOSPITAL OBSERVATION PER HR: HCPCS

## 2024-05-25 PROCEDURE — 99222 1ST HOSP IP/OBS MODERATE 55: CPT | Mod: 57 | Performed by: SURGERY

## 2024-05-25 PROCEDURE — 82962 GLUCOSE BLOOD TEST: CPT

## 2024-05-25 PROCEDURE — 700111 HCHG RX REV CODE 636 W/ 250 OVERRIDE (IP): Performed by: SURGERY

## 2024-05-25 PROCEDURE — 700101 HCHG RX REV CODE 250: Performed by: SURGERY

## 2024-05-25 PROCEDURE — 85025 COMPLETE CBC W/AUTO DIFF WBC: CPT

## 2024-05-25 PROCEDURE — 47562 LAPAROSCOPIC CHOLECYSTECTOMY: CPT | Performed by: SURGERY

## 2024-05-25 PROCEDURE — 87116 MYCOBACTERIA CULTURE: CPT

## 2024-05-25 PROCEDURE — 700117 HCHG RX CONTRAST REV CODE 255: Performed by: STUDENT IN AN ORGANIZED HEALTH CARE EDUCATION/TRAINING PROGRAM

## 2024-05-25 PROCEDURE — 47010 HEPATOT OPN DRG ABSC/CST 1/2: CPT | Performed by: SURGERY

## 2024-05-25 PROCEDURE — 93005 ELECTROCARDIOGRAM TRACING: CPT | Performed by: STUDENT IN AN ORGANIZED HEALTH CARE EDUCATION/TRAINING PROGRAM

## 2024-05-25 PROCEDURE — 88304 TISSUE EXAM BY PATHOLOGIST: CPT

## 2024-05-25 PROCEDURE — 76705 ECHO EXAM OF ABDOMEN: CPT

## 2024-05-25 PROCEDURE — 700105 HCHG RX REV CODE 258: Performed by: STUDENT IN AN ORGANIZED HEALTH CARE EDUCATION/TRAINING PROGRAM

## 2024-05-25 PROCEDURE — 96375 TX/PRO/DX INJ NEW DRUG ADDON: CPT

## 2024-05-25 PROCEDURE — 36415 COLL VENOUS BLD VENIPUNCTURE: CPT

## 2024-05-25 PROCEDURE — 87015 SPECIMEN INFECT AGNT CONCNTJ: CPT

## 2024-05-25 PROCEDURE — 87075 CULTR BACTERIA EXCEPT BLOOD: CPT

## 2024-05-25 PROCEDURE — A9270 NON-COVERED ITEM OR SERVICE: HCPCS | Performed by: SURGERY

## 2024-05-25 PROCEDURE — 47562 LAPAROSCOPIC CHOLECYSTECTOMY: CPT | Mod: AS | Performed by: REGISTERED NURSE

## 2024-05-25 PROCEDURE — 81001 URINALYSIS AUTO W/SCOPE: CPT

## 2024-05-25 PROCEDURE — 700102 HCHG RX REV CODE 250 W/ 637 OVERRIDE(OP): Performed by: SURGERY

## 2024-05-25 PROCEDURE — 700111 HCHG RX REV CODE 636 W/ 250 OVERRIDE (IP): Mod: JZ | Performed by: REGISTERED NURSE

## 2024-05-25 PROCEDURE — 700111 HCHG RX REV CODE 636 W/ 250 OVERRIDE (IP): Mod: JZ | Performed by: STUDENT IN AN ORGANIZED HEALTH CARE EDUCATION/TRAINING PROGRAM

## 2024-05-25 PROCEDURE — 0F904ZZ DRAINAGE OF LIVER, PERCUTANEOUS ENDOSCOPIC APPROACH: ICD-10-PCS | Performed by: SURGERY

## 2024-05-25 PROCEDURE — 74177 CT ABD & PELVIS W/CONTRAST: CPT

## 2024-05-25 PROCEDURE — 160029 HCHG SURGERY MINUTES - 1ST 30 MINS LEVEL 4: Performed by: SURGERY

## 2024-05-25 PROCEDURE — 160048 HCHG OR STATISTICAL LEVEL 1-5: Performed by: SURGERY

## 2024-05-25 PROCEDURE — 87102 FUNGUS ISOLATION CULTURE: CPT

## 2024-05-25 PROCEDURE — 110371 HCHG SHELL REV 272: Performed by: SURGERY

## 2024-05-25 PROCEDURE — 99291 CRITICAL CARE FIRST HOUR: CPT

## 2024-05-25 PROCEDURE — 0FT44ZZ RESECTION OF GALLBLADDER, PERCUTANEOUS ENDOSCOPIC APPROACH: ICD-10-PCS | Performed by: SURGERY

## 2024-05-25 PROCEDURE — 160002 HCHG RECOVERY MINUTES (STAT): Performed by: SURGERY

## 2024-05-25 PROCEDURE — 160035 HCHG PACU - 1ST 60 MINS PHASE I: Performed by: SURGERY

## 2024-05-25 PROCEDURE — 160041 HCHG SURGERY MINUTES - EA ADDL 1 MIN LEVEL 4: Performed by: SURGERY

## 2024-05-25 PROCEDURE — 87205 SMEAR GRAM STAIN: CPT | Mod: 91

## 2024-05-25 PROCEDURE — 87070 CULTURE OTHR SPECIMN AEROBIC: CPT

## 2024-05-25 PROCEDURE — 87206 SMEAR FLUORESCENT/ACID STAI: CPT

## 2024-05-25 PROCEDURE — 700105 HCHG RX REV CODE 258: Performed by: SURGERY

## 2024-05-25 PROCEDURE — 83690 ASSAY OF LIPASE: CPT

## 2024-05-25 PROCEDURE — 160009 HCHG ANES TIME/MIN: Performed by: SURGERY

## 2024-05-25 PROCEDURE — 96365 THER/PROPH/DIAG IV INF INIT: CPT

## 2024-05-25 RX ORDER — SODIUM CHLORIDE, SODIUM LACTATE, POTASSIUM CHLORIDE, CALCIUM CHLORIDE 600; 310; 30; 20 MG/100ML; MG/100ML; MG/100ML; MG/100ML
INJECTION, SOLUTION INTRAVENOUS CONTINUOUS
Status: DISCONTINUED | OUTPATIENT
Start: 2024-05-25 | End: 2024-05-25 | Stop reason: HOSPADM

## 2024-05-25 RX ORDER — ONDANSETRON 2 MG/ML
INJECTION INTRAMUSCULAR; INTRAVENOUS PRN
Status: DISCONTINUED | OUTPATIENT
Start: 2024-05-25 | End: 2024-05-25 | Stop reason: SURG

## 2024-05-25 RX ORDER — DEXAMETHASONE SODIUM PHOSPHATE 4 MG/ML
INJECTION, SOLUTION INTRA-ARTICULAR; INTRALESIONAL; INTRAMUSCULAR; INTRAVENOUS; SOFT TISSUE PRN
Status: DISCONTINUED | OUTPATIENT
Start: 2024-05-25 | End: 2024-05-25 | Stop reason: SURG

## 2024-05-25 RX ORDER — ENEMA 19; 7 G/133ML; G/133ML
1 ENEMA RECTAL
Status: DISCONTINUED | OUTPATIENT
Start: 2024-05-25 | End: 2024-05-29 | Stop reason: HOSPADM

## 2024-05-25 RX ORDER — ONDANSETRON 2 MG/ML
4 INJECTION INTRAMUSCULAR; INTRAVENOUS ONCE
Status: COMPLETED | OUTPATIENT
Start: 2024-05-25 | End: 2024-05-25

## 2024-05-25 RX ORDER — HYDROMORPHONE HYDROCHLORIDE 1 MG/ML
0.5 INJECTION, SOLUTION INTRAMUSCULAR; INTRAVENOUS; SUBCUTANEOUS
Status: DISCONTINUED | OUTPATIENT
Start: 2024-05-25 | End: 2024-05-29 | Stop reason: HOSPADM

## 2024-05-25 RX ORDER — POLYETHYLENE GLYCOL 3350 17 G/17G
1 POWDER, FOR SOLUTION ORAL 2 TIMES DAILY
Status: DISCONTINUED | OUTPATIENT
Start: 2024-05-25 | End: 2024-05-29 | Stop reason: HOSPADM

## 2024-05-25 RX ORDER — OXYCODONE HCL 5 MG/5 ML
5 SOLUTION, ORAL ORAL
Status: DISCONTINUED | OUTPATIENT
Start: 2024-05-25 | End: 2024-05-25 | Stop reason: HOSPADM

## 2024-05-25 RX ORDER — METRONIDAZOLE 500 MG/100ML
500 INJECTION, SOLUTION INTRAVENOUS ONCE
Status: COMPLETED | OUTPATIENT
Start: 2024-05-25 | End: 2024-05-25

## 2024-05-25 RX ORDER — MAGNESIUM SULFATE HEPTAHYDRATE 40 MG/ML
INJECTION, SOLUTION INTRAVENOUS PRN
Status: DISCONTINUED | OUTPATIENT
Start: 2024-05-25 | End: 2024-05-25 | Stop reason: SURG

## 2024-05-25 RX ORDER — HYDROMORPHONE HYDROCHLORIDE 1 MG/ML
0.5 INJECTION, SOLUTION INTRAMUSCULAR; INTRAVENOUS; SUBCUTANEOUS ONCE
Status: COMPLETED | OUTPATIENT
Start: 2024-05-25 | End: 2024-05-25

## 2024-05-25 RX ORDER — AMOXICILLIN 250 MG
1 CAPSULE ORAL NIGHTLY
Status: DISCONTINUED | OUTPATIENT
Start: 2024-05-25 | End: 2024-05-29 | Stop reason: HOSPADM

## 2024-05-25 RX ORDER — DIPHENHYDRAMINE HYDROCHLORIDE 50 MG/ML
12.5 INJECTION INTRAMUSCULAR; INTRAVENOUS
Status: DISCONTINUED | OUTPATIENT
Start: 2024-05-25 | End: 2024-05-25 | Stop reason: HOSPADM

## 2024-05-25 RX ORDER — OXYCODONE HYDROCHLORIDE 10 MG/1
10 TABLET ORAL
Status: DISCONTINUED | OUTPATIENT
Start: 2024-05-25 | End: 2024-05-29 | Stop reason: HOSPADM

## 2024-05-25 RX ORDER — BUPIVACAINE HYDROCHLORIDE AND EPINEPHRINE 5; 5 MG/ML; UG/ML
INJECTION, SOLUTION EPIDURAL; INTRACAUDAL; PERINEURAL
Status: DISCONTINUED | OUTPATIENT
Start: 2024-05-25 | End: 2024-05-25 | Stop reason: HOSPADM

## 2024-05-25 RX ORDER — LABETALOL HYDROCHLORIDE 5 MG/ML
5 INJECTION, SOLUTION INTRAVENOUS
Status: DISCONTINUED | OUTPATIENT
Start: 2024-05-25 | End: 2024-05-25 | Stop reason: HOSPADM

## 2024-05-25 RX ORDER — IBUPROFEN 200 MG
400 TABLET ORAL
COMMUNITY

## 2024-05-25 RX ORDER — HYDROMORPHONE HYDROCHLORIDE 2 MG/ML
INJECTION, SOLUTION INTRAMUSCULAR; INTRAVENOUS; SUBCUTANEOUS PRN
Status: DISCONTINUED | OUTPATIENT
Start: 2024-05-25 | End: 2024-05-25 | Stop reason: SURG

## 2024-05-25 RX ORDER — SODIUM CHLORIDE, SODIUM LACTATE, POTASSIUM CHLORIDE, CALCIUM CHLORIDE 600; 310; 30; 20 MG/100ML; MG/100ML; MG/100ML; MG/100ML
INJECTION, SOLUTION INTRAVENOUS CONTINUOUS
Status: DISCONTINUED | OUTPATIENT
Start: 2024-05-25 | End: 2024-05-29 | Stop reason: HOSPADM

## 2024-05-25 RX ORDER — DOCUSATE SODIUM 100 MG/1
100 CAPSULE, LIQUID FILLED ORAL 2 TIMES DAILY
Status: DISCONTINUED | OUTPATIENT
Start: 2024-05-25 | End: 2024-05-29 | Stop reason: HOSPADM

## 2024-05-25 RX ORDER — CELECOXIB 200 MG/1
200 CAPSULE ORAL ONCE
Status: ACTIVE | OUTPATIENT
Start: 2024-05-25 | End: 2024-05-26

## 2024-05-25 RX ORDER — ACETAMINOPHEN 500 MG
1000 TABLET ORAL EVERY 6 HOURS
Status: DISCONTINUED | OUTPATIENT
Start: 2024-05-25 | End: 2024-05-29 | Stop reason: HOSPADM

## 2024-05-25 RX ORDER — BISACODYL 10 MG
10 SUPPOSITORY, RECTAL RECTAL
Status: DISCONTINUED | OUTPATIENT
Start: 2024-05-25 | End: 2024-05-29 | Stop reason: HOSPADM

## 2024-05-25 RX ORDER — ALBUTEROL SULFATE 90 UG/1
2 AEROSOL, METERED RESPIRATORY (INHALATION) EVERY 4 HOURS PRN
Status: DISCONTINUED | OUTPATIENT
Start: 2024-05-25 | End: 2024-05-29 | Stop reason: HOSPADM

## 2024-05-25 RX ORDER — SODIUM CHLORIDE, SODIUM LACTATE, POTASSIUM CHLORIDE, CALCIUM CHLORIDE 600; 310; 30; 20 MG/100ML; MG/100ML; MG/100ML; MG/100ML
INJECTION, SOLUTION INTRAVENOUS
Status: DISCONTINUED | OUTPATIENT
Start: 2024-05-25 | End: 2024-05-25 | Stop reason: SURG

## 2024-05-25 RX ORDER — MIDAZOLAM HYDROCHLORIDE 1 MG/ML
INJECTION INTRAMUSCULAR; INTRAVENOUS PRN
Status: DISCONTINUED | OUTPATIENT
Start: 2024-05-25 | End: 2024-05-25 | Stop reason: SURG

## 2024-05-25 RX ORDER — AMOXICILLIN 250 MG
1 CAPSULE ORAL
Status: DISCONTINUED | OUTPATIENT
Start: 2024-05-25 | End: 2024-05-29 | Stop reason: HOSPADM

## 2024-05-25 RX ORDER — LIDOCAINE HYDROCHLORIDE 20 MG/ML
INJECTION, SOLUTION EPIDURAL; INFILTRATION; INTRACAUDAL; PERINEURAL PRN
Status: DISCONTINUED | OUTPATIENT
Start: 2024-05-25 | End: 2024-05-25 | Stop reason: SURG

## 2024-05-25 RX ORDER — FAMOTIDINE 20 MG/1
20 TABLET, FILM COATED ORAL 2 TIMES DAILY
Status: DISCONTINUED | OUTPATIENT
Start: 2024-05-25 | End: 2024-05-29 | Stop reason: HOSPADM

## 2024-05-25 RX ORDER — CEFAZOLIN 2 G/1
2 INJECTION, POWDER, FOR SOLUTION INTRAMUSCULAR; INTRAVENOUS ONCE
Status: COMPLETED | OUTPATIENT
Start: 2024-05-25 | End: 2024-05-25

## 2024-05-25 RX ORDER — HALOPERIDOL 5 MG/ML
1 INJECTION INTRAMUSCULAR
Status: DISCONTINUED | OUTPATIENT
Start: 2024-05-25 | End: 2024-05-25 | Stop reason: HOSPADM

## 2024-05-25 RX ORDER — OXYCODONE HCL 5 MG/5 ML
10 SOLUTION, ORAL ORAL
Status: DISCONTINUED | OUTPATIENT
Start: 2024-05-25 | End: 2024-05-25 | Stop reason: HOSPADM

## 2024-05-25 RX ORDER — HYDRALAZINE HYDROCHLORIDE 20 MG/ML
5 INJECTION INTRAMUSCULAR; INTRAVENOUS
Status: DISCONTINUED | OUTPATIENT
Start: 2024-05-25 | End: 2024-05-25 | Stop reason: HOSPADM

## 2024-05-25 RX ORDER — ONDANSETRON 2 MG/ML
4 INJECTION INTRAMUSCULAR; INTRAVENOUS EVERY 4 HOURS PRN
Status: DISCONTINUED | OUTPATIENT
Start: 2024-05-25 | End: 2024-05-29 | Stop reason: HOSPADM

## 2024-05-25 RX ORDER — MEPERIDINE HYDROCHLORIDE 25 MG/ML
12.5 INJECTION INTRAMUSCULAR; INTRAVENOUS; SUBCUTANEOUS
Status: DISCONTINUED | OUTPATIENT
Start: 2024-05-25 | End: 2024-05-25 | Stop reason: HOSPADM

## 2024-05-25 RX ORDER — ACETAMINOPHEN 500 MG
1000 TABLET ORAL EVERY 6 HOURS PRN
Status: DISCONTINUED | OUTPATIENT
Start: 2024-05-30 | End: 2024-05-29 | Stop reason: HOSPADM

## 2024-05-25 RX ORDER — SODIUM CHLORIDE, SODIUM LACTATE, POTASSIUM CHLORIDE, CALCIUM CHLORIDE 600; 310; 30; 20 MG/100ML; MG/100ML; MG/100ML; MG/100ML
1000 INJECTION, SOLUTION INTRAVENOUS ONCE
Status: COMPLETED | OUTPATIENT
Start: 2024-05-25 | End: 2024-05-25

## 2024-05-25 RX ORDER — HYDROCODONE BITARTRATE AND ACETAMINOPHEN 5; 325 MG/1; MG/1
1 TABLET ORAL 3 TIMES DAILY PRN
COMMUNITY

## 2024-05-25 RX ORDER — HYDROMORPHONE HYDROCHLORIDE 1 MG/ML
0.4 INJECTION, SOLUTION INTRAMUSCULAR; INTRAVENOUS; SUBCUTANEOUS
Status: DISCONTINUED | OUTPATIENT
Start: 2024-05-25 | End: 2024-05-25 | Stop reason: HOSPADM

## 2024-05-25 RX ORDER — HYDROMORPHONE HYDROCHLORIDE 1 MG/ML
0.1 INJECTION, SOLUTION INTRAMUSCULAR; INTRAVENOUS; SUBCUTANEOUS
Status: DISCONTINUED | OUTPATIENT
Start: 2024-05-25 | End: 2024-05-25 | Stop reason: HOSPADM

## 2024-05-25 RX ORDER — KETOROLAC TROMETHAMINE 30 MG/ML
INJECTION, SOLUTION INTRAMUSCULAR; INTRAVENOUS PRN
Status: DISCONTINUED | OUTPATIENT
Start: 2024-05-25 | End: 2024-05-25 | Stop reason: SURG

## 2024-05-25 RX ORDER — ENOXAPARIN SODIUM 100 MG/ML
40 INJECTION SUBCUTANEOUS DAILY
Status: DISCONTINUED | OUTPATIENT
Start: 2024-05-26 | End: 2024-05-29 | Stop reason: HOSPADM

## 2024-05-25 RX ORDER — OXYCODONE HYDROCHLORIDE 5 MG/1
5 TABLET ORAL
Status: DISCONTINUED | OUTPATIENT
Start: 2024-05-25 | End: 2024-05-29 | Stop reason: HOSPADM

## 2024-05-25 RX ORDER — HYDROMORPHONE HYDROCHLORIDE 1 MG/ML
0.2 INJECTION, SOLUTION INTRAMUSCULAR; INTRAVENOUS; SUBCUTANEOUS
Status: DISCONTINUED | OUTPATIENT
Start: 2024-05-25 | End: 2024-05-25 | Stop reason: HOSPADM

## 2024-05-25 RX ORDER — METRONIDAZOLE 500 MG/100ML
500 INJECTION, SOLUTION INTRAVENOUS EVERY 8 HOURS
Qty: 600 ML | Refills: 0 | Status: DISCONTINUED | OUTPATIENT
Start: 2024-05-25 | End: 2024-05-28

## 2024-05-25 RX ADMIN — DEXAMETHASONE SODIUM PHOSPHATE 8 MG: 4 INJECTION INTRA-ARTICULAR; INTRALESIONAL; INTRAMUSCULAR; INTRAVENOUS; SOFT TISSUE at 11:37

## 2024-05-25 RX ADMIN — FAMOTIDINE 20 MG: 20 TABLET, FILM COATED ORAL at 17:27

## 2024-05-25 RX ADMIN — CEFTRIAXONE SODIUM 2000 MG: 10 INJECTION, POWDER, FOR SOLUTION INTRAVENOUS at 08:57

## 2024-05-25 RX ADMIN — SODIUM CHLORIDE, POTASSIUM CHLORIDE, SODIUM LACTATE AND CALCIUM CHLORIDE: 600; 310; 30; 20 INJECTION, SOLUTION INTRAVENOUS at 09:00

## 2024-05-25 RX ADMIN — LIDOCAINE HYDROCHLORIDE 100 MG: 20 INJECTION, SOLUTION EPIDURAL; INFILTRATION; INTRACAUDAL at 11:37

## 2024-05-25 RX ADMIN — MIDAZOLAM HYDROCHLORIDE 2 MG: 1 INJECTION, SOLUTION INTRAMUSCULAR; INTRAVENOUS at 11:33

## 2024-05-25 RX ADMIN — ACETAMINOPHEN 1000 MG: 500 TABLET, FILM COATED ORAL at 09:34

## 2024-05-25 RX ADMIN — HYDROMORPHONE HYDROCHLORIDE 0.5 MG: 1 INJECTION, SOLUTION INTRAMUSCULAR; INTRAVENOUS; SUBCUTANEOUS at 05:04

## 2024-05-25 RX ADMIN — FENTANYL CITRATE 50 MCG: 50 INJECTION, SOLUTION INTRAMUSCULAR; INTRAVENOUS at 12:49

## 2024-05-25 RX ADMIN — SODIUM CHLORIDE, POTASSIUM CHLORIDE, SODIUM LACTATE AND CALCIUM CHLORIDE: 600; 310; 30; 20 INJECTION, SOLUTION INTRAVENOUS at 15:46

## 2024-05-25 RX ADMIN — METRONIDAZOLE 500 MG: 500 INJECTION, SOLUTION INTRAVENOUS at 06:30

## 2024-05-25 RX ADMIN — HYDROMORPHONE HYDROCHLORIDE 0.8 MG: 2 INJECTION INTRAMUSCULAR; INTRAVENOUS; SUBCUTANEOUS at 14:20

## 2024-05-25 RX ADMIN — ACETAMINOPHEN 1000 MG: 500 TABLET, FILM COATED ORAL at 23:42

## 2024-05-25 RX ADMIN — ONDANSETRON 8 MG: 2 INJECTION INTRAMUSCULAR; INTRAVENOUS at 14:02

## 2024-05-25 RX ADMIN — HYDROMORPHONE HYDROCHLORIDE 0.4 MG: 2 INJECTION INTRAMUSCULAR; INTRAVENOUS; SUBCUTANEOUS at 14:17

## 2024-05-25 RX ADMIN — METRONIDAZOLE 500 MG: 500 INJECTION, SOLUTION INTRAVENOUS at 22:05

## 2024-05-25 RX ADMIN — FENTANYL CITRATE 50 MCG: 50 INJECTION, SOLUTION INTRAMUSCULAR; INTRAVENOUS at 11:33

## 2024-05-25 RX ADMIN — ROCURONIUM BROMIDE 20 MG: 10 INJECTION, SOLUTION INTRAVENOUS at 12:26

## 2024-05-25 RX ADMIN — ROCURONIUM BROMIDE 20 MG: 10 INJECTION, SOLUTION INTRAVENOUS at 11:58

## 2024-05-25 RX ADMIN — CEFAZOLIN 2 G: 2 INJECTION, POWDER, FOR SOLUTION INTRAMUSCULAR; INTRAVENOUS at 06:45

## 2024-05-25 RX ADMIN — ONDANSETRON 4 MG: 2 INJECTION INTRAMUSCULAR; INTRAVENOUS at 05:04

## 2024-05-25 RX ADMIN — PROPOFOL 200 MG: 10 INJECTION, EMULSION INTRAVENOUS at 11:37

## 2024-05-25 RX ADMIN — SUGAMMADEX 200 MG: 100 INJECTION, SOLUTION INTRAVENOUS at 14:14

## 2024-05-25 RX ADMIN — ROCURONIUM BROMIDE 50 MG: 10 INJECTION, SOLUTION INTRAVENOUS at 11:37

## 2024-05-25 RX ADMIN — ACETAMINOPHEN 1000 MG: 500 TABLET, FILM COATED ORAL at 17:27

## 2024-05-25 RX ADMIN — HYDROMORPHONE HYDROCHLORIDE 0.5 MG: 1 INJECTION, SOLUTION INTRAMUSCULAR; INTRAVENOUS; SUBCUTANEOUS at 07:49

## 2024-05-25 RX ADMIN — FENTANYL CITRATE 50 MCG: 50 INJECTION, SOLUTION INTRAMUSCULAR; INTRAVENOUS at 13:19

## 2024-05-25 RX ADMIN — ROCURONIUM BROMIDE 10 MG: 10 INJECTION, SOLUTION INTRAVENOUS at 12:49

## 2024-05-25 RX ADMIN — FENTANYL CITRATE 50 MCG: 50 INJECTION, SOLUTION INTRAMUSCULAR; INTRAVENOUS at 12:17

## 2024-05-25 RX ADMIN — FENTANYL CITRATE 50 MCG: 50 INJECTION, SOLUTION INTRAMUSCULAR; INTRAVENOUS at 12:03

## 2024-05-25 RX ADMIN — FENTANYL CITRATE 50 MCG: 50 INJECTION, SOLUTION INTRAMUSCULAR; INTRAVENOUS at 12:57

## 2024-05-25 RX ADMIN — SODIUM CHLORIDE, POTASSIUM CHLORIDE, SODIUM LACTATE AND CALCIUM CHLORIDE 1000 ML: 600; 310; 30; 20 INJECTION, SOLUTION INTRAVENOUS at 05:07

## 2024-05-25 RX ADMIN — MAGNESIUM SULFATE HEPTAHYDRATE 2 G: 4 INJECTION, SOLUTION INTRAVENOUS at 11:47

## 2024-05-25 RX ADMIN — KETOROLAC TROMETHAMINE 30 MG: 30 INJECTION, SOLUTION INTRAMUSCULAR; INTRAVENOUS at 14:02

## 2024-05-25 RX ADMIN — FENTANYL CITRATE 50 MCG: 50 INJECTION, SOLUTION INTRAMUSCULAR; INTRAVENOUS at 11:46

## 2024-05-25 RX ADMIN — SODIUM CHLORIDE, POTASSIUM CHLORIDE, SODIUM LACTATE AND CALCIUM CHLORIDE: 600; 310; 30; 20 INJECTION, SOLUTION INTRAVENOUS at 11:33

## 2024-05-25 RX ADMIN — IOHEXOL 100 ML: 350 INJECTION, SOLUTION INTRAVENOUS at 05:25

## 2024-05-25 RX ADMIN — HYDROMORPHONE HYDROCHLORIDE 0.4 MG: 2 INJECTION INTRAMUSCULAR; INTRAVENOUS; SUBCUTANEOUS at 14:19

## 2024-05-25 RX ADMIN — HYDROMORPHONE HYDROCHLORIDE 0.4 MG: 2 INJECTION INTRAMUSCULAR; INTRAVENOUS; SUBCUTANEOUS at 14:10

## 2024-05-25 ASSESSMENT — COGNITIVE AND FUNCTIONAL STATUS - GENERAL
MOBILITY SCORE: 24
SUGGESTED CMS G CODE MODIFIER DAILY ACTIVITY: CH
SUGGESTED CMS G CODE MODIFIER MOBILITY: CH
DAILY ACTIVITIY SCORE: 24

## 2024-05-25 ASSESSMENT — PATIENT HEALTH QUESTIONNAIRE - PHQ9
2. FEELING DOWN, DEPRESSED, IRRITABLE, OR HOPELESS: NOT AT ALL
SUM OF ALL RESPONSES TO PHQ9 QUESTIONS 1 AND 2: 0
1. LITTLE INTEREST OR PLEASURE IN DOING THINGS: NOT AT ALL

## 2024-05-25 ASSESSMENT — COPD QUESTIONNAIRES
COPD SCREENING SCORE: 0
DURING THE PAST 4 WEEKS HOW MUCH DID YOU FEEL SHORT OF BREATH: NONE/LITTLE OF THE TIME
DO YOU EVER COUGH UP ANY MUCUS OR PHLEGM?: NO/ONLY WITH OCCASIONAL COLDS OR INFECTIONS
HAVE YOU SMOKED AT LEAST 100 CIGARETTES IN YOUR ENTIRE LIFE: NO/DON'T KNOW

## 2024-05-25 ASSESSMENT — LIFESTYLE VARIABLES
TOTAL SCORE: 0
ALCOHOL_USE: NO
EVER HAD A DRINK FIRST THING IN THE MORNING TO STEADY YOUR NERVES TO GET RID OF A HANGOVER: NO
TOTAL SCORE: 0
HAVE PEOPLE ANNOYED YOU BY CRITICIZING YOUR DRINKING: NO
DOES PATIENT WANT TO STOP DRINKING: NO
AVERAGE NUMBER OF DAYS PER WEEK YOU HAVE A DRINK CONTAINING ALCOHOL: 0
ON A TYPICAL DAY WHEN YOU DRINK ALCOHOL HOW MANY DRINKS DO YOU HAVE: 0
EVER FELT BAD OR GUILTY ABOUT YOUR DRINKING: NO
CONSUMPTION TOTAL: NEGATIVE
HAVE YOU EVER FELT YOU SHOULD CUT DOWN ON YOUR DRINKING: NO
TOTAL SCORE: 0
HOW MANY TIMES IN THE PAST YEAR HAVE YOU HAD 5 OR MORE DRINKS IN A DAY: 0

## 2024-05-25 ASSESSMENT — SOCIAL DETERMINANTS OF HEALTH (SDOH)
WITHIN THE LAST YEAR, HAVE YOU BEEN HUMILIATED OR EMOTIONALLY ABUSED IN OTHER WAYS BY YOUR PARTNER OR EX-PARTNER?: NO
WITHIN THE LAST YEAR, HAVE TO BEEN RAPED OR FORCED TO HAVE ANY KIND OF SEXUAL ACTIVITY BY YOUR PARTNER OR EX-PARTNER?: NO
WITHIN THE PAST 12 MONTHS, YOU WORRIED THAT YOUR FOOD WOULD RUN OUT BEFORE YOU GOT THE MONEY TO BUY MORE: NEVER TRUE
WITHIN THE LAST YEAR, HAVE YOU BEEN KICKED, HIT, SLAPPED, OR OTHERWISE PHYSICALLY HURT BY YOUR PARTNER OR EX-PARTNER?: NO
WITHIN THE PAST 12 MONTHS, THE FOOD YOU BOUGHT JUST DIDN'T LAST AND YOU DIDN'T HAVE MONEY TO GET MORE: NEVER TRUE
WITHIN THE LAST YEAR, HAVE YOU BEEN AFRAID OF YOUR PARTNER OR EX-PARTNER?: NO
IN THE PAST 12 MONTHS, HAS THE ELECTRIC, GAS, OIL, OR WATER COMPANY THREATENED TO SHUT OFF SERVICE IN YOUR HOME?: NO

## 2024-05-25 ASSESSMENT — PAIN DESCRIPTION - PAIN TYPE
TYPE: ACUTE PAIN
TYPE: SURGICAL PAIN
TYPE: ACUTE PAIN
TYPE: SURGICAL PAIN
TYPE: ACUTE PAIN
TYPE: SURGICAL PAIN

## 2024-05-25 ASSESSMENT — FIBROSIS 4 INDEX: FIB4 SCORE: 0.51

## 2024-05-25 NOTE — ANESTHESIA POSTPROCEDURE EVALUATION
Patient: Jayden Nielsen    Procedure Summary       Date: 05/25/24 Room / Location: [facility] 08 / SURGERY [facility]    Anesthesia Start: 1133 Anesthesia Stop: 1426    Procedure: CHOLECYSTECTOMY, LAPAROSCOPIC (Abdomen) Diagnosis: (Acute Cholecystitis)    Surgeons: Gerry Barry M.D. Responsible Provider: Jasen Eugene M.D.    Anesthesia Type: general ASA Status: 2            Final Anesthesia Type: general  Last vitals  BP   Blood Pressure: 134/85    Temp   36 °C (96.8 °F)    Pulse   (!) 111   Resp   20    SpO2   96 %      Anesthesia Post Evaluation    Patient location during evaluation: PACU  Level of consciousness: awake and alert    Airway patency: patent  Anesthetic complications: no  Cardiovascular status: hemodynamically stable  Respiratory status: acceptable  Hydration status: euvolemic    PONV: none          No notable events documented.     Nurse Pain Score: 0 (NPRS)

## 2024-05-25 NOTE — OR SURGEON
Immediate Post OP Note    PreOp Diagnosis: Acute cholecystitis      PostOp Diagnosis: Acute cholecystitis with necrotic wall and liver abscess      Procedure(s):  CHOLECYSTECTOMY, LAPAROSCOPIC - Wound Class: Dirty or Infected    Surgeon(s):  Gerry Barry M.D.    Anesthesiologist/Type of Anesthesia:  Anesthesiologist: Jasen Eugene M.D./General    Surgical Staff:  Circulator: Deidra Burkett R.N.  Relief Circulator: Damari Peterson R.N.  Relief Scrub: Austin Wilson  Scrub Person: Karey Grimes  First Assist: ARMANI Win    Specimens removed if any:  ID Type Source Tests Collected by Time Destination   1 : Gallbladder Aspirate Body Fluid Other AFB CULTURE, FUNGAL CULTURE, AEROBIC/ANAEROBIC CULTURE (SURGERY) Gerry Barry M.D. 5/25/2024 12:09 PM    A :  Tissue Gallbladder PATHOLOGY SPECIMEN Gerry Barry M.D. 5/25/2024  2:05 PM        Estimated Blood Loss: 100 cc    Findings: Gallbladder completely walled off by omentum with RUQ bile staining   Multiple areas of GB wall necrosis.  Necrosis of posterior GB wall with abscess into liver    Complications: none    Drain: RUQ subhepatic 10 mm channel drain    5/25/2024 2:28 PM Gerry Barry M.D.

## 2024-05-25 NOTE — PROGRESS NOTES
PREOPERATIVE NOTE: I have seen and examined the patient today.  Critical physical examination findings, laboratory indices, and radiographic studies reviewed.  I recommend laparoscopic cholecystectomy, possible open cholecystectomy.    The operative strategy was explained and reviewed. Alternatives discussed. Potential complications including, but not limited to, infection, bleeding, damage to adjacent structures, and anesthetic complications were discussed. Operative consent signed.        ____________________________________     Gerry Barry M.D.    DD: 5/25/2024  11:33 AM

## 2024-05-25 NOTE — ANESTHESIA TIME REPORT
Anesthesia Start and Stop Event Times       Date Time Event    5/25/2024 1109 Ready for Procedure     1133 Anesthesia Start     1426 Anesthesia Stop          Responsible Staff  05/25/24      Name Role Begin End    Jasen Eugene M.D. Anesth 1133 1426          Overtime Reason:  no overtime (within assigned shift)    Comments:

## 2024-05-25 NOTE — ED PROVIDER NOTES
ED Provider Note    CHIEF COMPLAINT  Chief Complaint   Patient presents with    Abdominal Pain     Abdominal pain started 12MN; mid and radiating to right side; state pain also radiates to his right neck; denies N/V; Patient working at [employer] states pain is unbearable right now. 7/10       EXTERNAL RECORDS REVIEWED  Outpatient Notes patient was seen in the emergency department 5/13/2024 for evaluation of abdominal pain.  He had an EKG that was unremarkable, laboratory testing demonstrating a slight leukocytosis and an anion gap metabolic acidosis and a chest x-ray that was normal.  He was recommended to initiate sucralfate and omeprazole therapy and was prescribed Norco for the ongoing pain.  Diagnostic imaging not performed as patient had been seen 1 week prior to this and had a normal CT scan at that time in the setting of similar symptoms.    HPI/ROS  LIMITATION TO HISTORY   Select: : None      Jayden Nielsen is a 29 y.o. male who presents to the emergency department for evaluation of abdominal pain.  The patient reports he has had intermittent episodes of epigastric and right upper quadrant abdominal pain, right flank pain for the last month, waxing and waning.  He reports he was feeling better for the last 4 days but pain returned this evening while at work.  He reports that as a cramping sensation currently rated 8-9 out of 10.  He denies nausea and vomiting.  He reports pain with urination over the last few days as well as fevers up to 101 degrees.  He reports a history of kidney stones as well as urinary tract infection.  He denies any testicular pain or swelling.  He reports frequent loose stools that change in color from orange to green.  He denies any blood in the stool.  He denies any recent travel, recent antibiotic use.  He states he has been taking his prescribed omeprazole sucralfate therapy which has been helping intermittently and is scheduled for follow-up appointment with gastroenterology but  "not for some time.    PAST MEDICAL HISTORY   has a past medical history of Asthma, Kidney stone, and Kidney stone.    SURGICAL HISTORY  patient denies any surgical history    FAMILY HISTORY  Family History   Problem Relation Age of Onset    Cancer Mother         ?breast       SOCIAL HISTORY  Social History     Tobacco Use    Smoking status: Never    Smokeless tobacco: Never   Vaping Use    Vaping status: Never Used   Substance and Sexual Activity    Alcohol use: Not Currently     Comment: socially, maybe 2 drinks a year    Drug use: No    Sexual activity: Not Currently     Partners: Male     Birth control/protection: Abstinence       CURRENT MEDICATIONS  Home Medications       Reviewed by Harish Cedillo (Pharmacy Tech) on 05/25/24 at 0505  Med List Status: Complete     Medication Last Dose Status   albuterol 108 (90 Base) MCG/ACT Aero Soln inhalation aerosol <3 weeks Active   dicyclomine (BENTYL) 10 MG Cap NEW Active   HYDROcodone-acetaminophen (NORCO) 5-325 MG Tab per tablet 5-6 days ago Active   ibuprofen (MOTRIN) 200 MG Tab 5/23/2024 Active   omeprazole (PRILOSEC) 40 MG delayed-release capsule 5/24/2024 Active   ondansetron (ZOFRAN) 4 MG Tab tablet 5/11/2024 Active   sucralfate (CARAFATE) 1 GM/10ML Suspension 5/23/2024 Active                  Audit from Redirected Encounters    **Home medications have not yet been reviewed for this encounter**         ALLERGIES  No Known Allergies    PHYSICAL EXAM  VITAL SIGNS: /75   Pulse 78   Temp 36.7 °C (98 °F)   Resp 19   Ht 1.727 m (5' 8\")   Wt 109 kg (240 lb)   SpO2 99%   BMI 36.49 kg/m²    Constitutional: Uncomfortable appearing  HEENT: Atraumatic, normocephalic, pupils are equal round reactive to light, nose normal, mouth shows moist mucous membranes  Neck: Supple, no JVD, no tracheal deviation  Cardiovascular: Regular rate and rhythm, no murmur, rub or gallop, 2+ pulses peripherally x4  Thorax & Lungs: No respiratory distress, no wheezes, rales or " rhonchi, no chest wall tenderness.  GI: Soft, non-distended, epigastric and right upper quadrant tenderness with guarding, positive Liu sign, no tenderness at McBurney's point, no rebound  : Normal uncircumcised male external genitalia.  Normal testicular lie with no testicular edema, erythema, masses or tenderness.  Normal cremasteric reflex bilaterally.  Skin: Warm, dry, no acute rash or lesion  Musculoskeletal: Moving all extremities, no acute deformity, no edema, no tenderness  Neurologic: A&Ox3, at baseline mentation, cranial nerves II through XII are grossly intact, no sensory deficit, no ataxia  Psychiatric: Appropriate affect for situation at this time      EKG/LABS  Labs Reviewed   CBC WITH DIFFERENTIAL - Abnormal; Notable for the following components:       Result Value    WBC 11.6 (*)     Platelet Count 637 (*)     Immature Granulocytes 1.40 (*)     Neutrophils (Absolute) 8.00 (*)     Immature Granulocytes (abs) 0.16 (*)     All other components within normal limits   COMP METABOLIC PANEL - Abnormal; Notable for the following components:    Glucose 100 (*)     AST(SGOT) 201 (*)     ALT(SGPT) 142 (*)     Alkaline Phosphatase 129 (*)     All other components within normal limits   LIPASE   URINALYSIS   ESTIMATED GFR   URINE MICROSCOPIC (W/UA)     Results for orders placed or performed during the hospital encounter of 24   EKG   Result Value Ref Range    Report       [facility] Emergency Dept.    Test Date:  2024  Pt Name:    KELI WALTERS                Department: ER  MRN:        [id number]                      Room:       [facility]  Gender:     Male                         Technician: [id number]  :        1995                   Requested By:ER TRIAGE PROTOCOL  Order #:    [id number]                    Osmany BRAMBILA: Lopez Sibley    Measurements  Intervals                                Axis  Rate:       78                           P:          73  HI:         156                           QRS:        71  QRSD:       93                           T:          31  QT:         394  QTc:        449    Interpretive Statements  Sinus rhythm  Compared to ECG 05/13/2024 15:40:51  Sinus bradycardia no longer present  Electronically Signed On 05- 05:12:15 PDT by Lopez Sibley         I have independently interpreted this EKG    RADIOLOGY/PROCEDURES   I have independently interpreted the diagnostic imaging associated with this visit and am waiting the final reading from the radiologist.   My preliminary interpretation is as follows: CT scan reviewed by myself and demonstrates a thickened gallbladder wall with pericholecystic fluid.    Radiologist interpretation:  CT-ABDOMEN-PELVIS WITH   Final Result         1. Gallbladder wall thickening with adjacent inflammation and mild pericholecystic fluid. Therefore, suggesting acute cholecystitis, recommend follow-up right upper quadrant ultrasound.   2. Otherwise, negative.      US-RUQ    (Results Pending)       COURSE & MEDICAL DECISION MAKING    ASSESSMENT, COURSE AND PLAN  Care Narrative:     Patient presents to the emergency department for reevaluation of epigastric right upper quadrant and right flank pain.  Has been seen for this previously in the emergency department with imaging performed earlier this month that was unremarkable other than evidence of hepatosteatosis.  Patient does have a history of nephrolithiasis and also urinary tract infections and has been reporting fevers over the last few days.  He arrives afebrile, hemodynamically stable but uncomfortable appearing.  He is tender in the epigastrium and right upper quadrant with a positive Liu sign.  Bedside ultrasound demonstrates a thickened gallbladder wall but no obvious cholelithiasis or pericholecystic fluid.  Broad differential diagnosis at this point including cholecystitis versus choledocholithiasis, pancreatitis, kidney stone, pyelonephritis, peptic ulcer disease,  gastritis, bowel obstruction.  Less likely appendicitis given no lower abdominal tenderness.  Less likely testicular torsion given no testicular pain and normal examination.  Will plan for repeat laboratory workup, CT scan abdomen pelvis chiefly to assess for obstructing ureterolithiasis and right upper quadrant ultrasound to assess for additional findings of cholecystitis.  Will provide IV Dilaudid and Zofran as well as rehydrate with lactated Ringer's.    CT imaging reviewed and appears to demonstrate findings of acute cholecystitis.  Will confirm with right upper quadrant ultrasound.  Lab work pending.    US images reviewed. Patient has a cholelithiasis and sludge, thickened gallbladder wall and pericholecystitic fluid consistent with acute cholecystitis in the setting of his symptoms, and labs remarkable for new transaminitis. Will provide IV antibiotics and discuss case with general surgery.    Case discussed with Dr. Resendiz, general surgery who will evaluate the patient and likely admit for operative intervention. Patient updated on this plan of care and on board with such.    Hydration: Based on the patient's presentation of Inability to take oral fluids the patient was given IV fluids. IV Hydration was used because oral hydration was not adequate alone. Upon recheck following hydration, the patient was improved.          ADDITIONAL PROBLEMS MANAGED  None    DISPOSITION AND DISCUSSIONS  I have discussed management of the patient with the following physicians and BEN's:  Dr. Resendiz, general surgeon    Discussion of management with other [facility] or appropriate source(s): None     Decision tools and prescription drugs considered including, but not limited to: Antibiotics ancef and flagyl and Pain Medications dilaudid .    FINAL DIAGNOSIS  1. Acute cholecystitis           Electronically signed by: Lopez Sibley M.D., 5/25/2024 4:49 AM

## 2024-05-25 NOTE — LETTER
[facility]  GEN SURGERY [address]  [facility] [facility] [address]  [phone]     May 29, 2024    Patient: Jayden Nielsen   YOB: 1995   Date of Visit: 5/25/2024       To Whom It May Concern:    Jayden Nielsen was seen and treated in our department on 5/25/2024. We recommend he remains off from work until we see him in follow up clinic on June 4th.      Sincerely,         Meli Abraham D.N.P.

## 2024-05-25 NOTE — ASSESSMENT & PLAN NOTE
5/25 Laparoscopic cholecystectomy with liver abscess drainage  Clear liquid diet  IV antibiotics.  5/27 drain output bilious - 560ml  5/28 drain output slowing down, 195 ml bilious output.

## 2024-05-25 NOTE — DISCHARGE INSTRUCTIONS
Post Operative Discharge Instructions:    1. DIET: Upon discharge from the hospital, you may resume your normal preoperative diet, unless specifically directed otherwise. Depending on how you are feeling and whether you have nausea or not, you may wish to stay with a bland diet for the first few days. However, you can advance this as quickly as you feel ready.    2. ACTIVITIES: After discharge from the hospital, you may resume full routine activities; however, there should be no heavy lifting (greater than 20 pounds or a bag of groceries) and no strenuous activities for at least 2 weeks. The duration may be longer, depending on your surgical procedure. Routine activities of daily living are acceptable. Activity level should be addressed at your post-op follow up appointment.    3. DRIVING: You may drive whenever you are off pain medications and are able to perform the activities needed to drive, i.e., turning, bending, twisting, etc.    4. BATHING: You may get the wound wet at any time after leaving the hospital. You may shower, but do not submerge in a bath for at least two weeks.  If you have wound dressings, they may come off after 48 hours.  If you have skin glue to the wound, this will fall off on its own, do not pick at it.  If you have Steri-Strips to the wound, these will fall off on their own, do not pick at them. You may trim the edges if needed.    5. BOWEL FUNCTION:   After surgery, it is not uncommon for patients to develop either frequent or loose stools after meals. This usually corrects itself after a few days, to a few weeks. If this occurs, do not worry; this will resolve on its own.  Constipation is much more common than loose stools. The cause is the combination of pain medication and decreased activity level and possibly the nature of the surgical procedure performed. If you feel this is occurring, you may use an over-the-counter treatment such as MiraLAX (or Milk of Magnesia, Ex-Lax,  Senokot, etc.) until the problem has resolved. Drink plenty of water and try to wean off narcotic pain medications as soon as is comfortable for you.    6. PAIN MEDICATION:   You will be given a prescription for pain medication at discharge. Please take these as directed. It is important to remember not to take medications on an empty stomach as this may cause nausea.  You may also take over the counter acetaminophen per the package instructions.  Avoid NSAIDs x 5 days due to risk of bleed with sphincterotomy.   You may also use ice to the wound to decrease pain and swelling. You may alternate 20 minutes on and 20 minutes off with the ice for the first 24-48 hours. Make sure you place a washcloth or towel between the ice pack and your skin.  Please note that narcotic pain medication cannot be refilled unless you are seen by a doctor. Make sure you call the office if you are running low on medication or if the dose you have been prescribed is not working well for you.    7. Drain Maintenance:  - Empty drain every 8 hours and record output. Make sure it is to self suction.    8. CALL THE [facility] AT [phone] IF YOU HAVE:  (1) Fevers to more than 101F, (2) Unusual chest or leg pain, (3) Drainage or fluid from incision that may be foul smelling, increased tenderness or soreness at the wound or the wound edges are no longer together, redness or swelling at the incision site. Do not hesitate to call with any other questions.     Surgical Drain Home Care  Surgical drains are used to remove extra fluid that normally builds up in a surgical wound after surgery. A surgical drain helps to heal a surgical wound. Different kinds of surgical drains include:  Active drains. These drains use suction to pull drainage away from the surgical wound. Drainage flows through a tube to a container outside of the body. With these drains, you need to keep the bulb or the drainage container flat (compressed) at all times,  except while you empty it. Flattening the bulb or container creates suction.  Passive drains. These drains allow fluid to drain naturally, by gravity. Drainage flows through a tube to a bandage (dressing) or a container outside of the body. Passive drains do not need to be emptied.  A drain is placed during surgery. Right after surgery, drainage is usually bright red and a little thicker than water. The drainage may gradually turn yellow or pink and become thinner. It is likely that your health care provider will remove the drain when the drainage stops or when the amount decreases to 1-2 Tbsp (15-30 mL) during a 24-hour period.  Supplies needed:  Tape.  Germ-free cleaning solution (sterile saline).  Cotton swabs.  Split gauze drain sponge: 4 x 4 inches (10 x 10 cm).  Gauze square: 4 x 4 inches (10 x 10 cm).  How to care for your surgical drain  Care for your drain as told by your health care provider. This is important to help prevent infection. If your drain is placed at your back, or any other hard-to-reach area, ask another person to assist you in performing the following tasks:  General care  Keep the skin around the drain dry and covered with a dressing at all times.  Check your drain area every day for signs of infection. Check for:  Redness, swelling, or pain.  Pus or a bad smell.  Cloudy drainage.  Tenderness or pressure at the drain exit site.  Changing the dressing  Follow instructions from your health care provider about how to change your dressing. Change your dressing at least once a day. Change it more often if needed to keep the dressing dry. Make sure you:  Gather your supplies.  Wash your hands with soap and water before you change your dressing. If soap and water are not available, use hand .  Remove the old dressing. Avoid using scissors to do that.  Wash your hands with soap and water again after removing the old dressing.  Use sterile saline to clean your skin around the drain. You may  need to use a cotton swab to clean the skin.  Place the tube through the slit in a drain sponge. Place the drain sponge so that it covers your wound.  Place the gauze square or another drain sponge on top of the drain sponge that is on the wound. Make sure the tube is between those layers.  Tape the dressing to your skin.  Tape the drainage tube to your skin 1-2 inches (2.5-5 cm) below the place where the tube enters your body. Taping keeps the tube from pulling on any stitches (sutures) that you have.  Wash your hands with soap and water.  Write down the color of your drainage and how often you change your dressing.  How to empty your active drain    Make sure that you have a measuring cup that you can empty your drainage into.  Wash your hands with soap and water. If soap and water are not available, use hand .  Loosen any pins or clips that hold the tube in place.  If your health care provider tells you to strip the tube to prevent clots and tube blockages:  Hold the tube at the skin with one hand. Use your other hand to pinch the tubing with your thumb and first finger.  Gently move your fingers down the tube while squeezing very lightly. This clears any drainage, clots, or tissue from the tube.  You may need to do this several times each day to keep the tube clear. Do not pull on the tube.  Open the bulb cap or the drain plug. Do not touch the inside of the cap or the bottom of the plug.  Turn the device upside down and gently squeeze.  Empty all of the drainage into the measuring cup.  Compress the bulb or the container and replace the cap or the plug. To compress the bulb or the container, squeeze it firmly in the middle while you close the cap or plug the container.  Write down the amount of drainage that you have in each 24-hour period. If you have less than 2 Tbsp (30 mL) of drainage during 24 hours, contact your health care provider.  Flush the drainage down the toilet.  Wash your hands with soap  and water.  Contact a health care provider if:  You have redness, swelling, or pain around your drain area.  You have pus or a bad smell coming from your drain area.  You have a fever or chills.  The skin around your drain is warm to the touch.  The amount of drainage that you have is increasing instead of decreasing.  You have drainage that is cloudy.  There is a sudden stop or a sudden decrease in the amount of drainage that you have.  Your drain tube falls out.  Your active drain does not stay compressed after you empty it.  Summary  Surgical drains are used to remove extra fluid that normally builds up in a surgical wound after surgery.  Different kinds of surgical drains include active drains and passive drains. Active drains use suction to pull drainage away from the surgical wound, and passive drains allow fluid to drain naturally.  It is important to care for your drain to prevent infection. If your drain is placed at your back, or any other hard-to-reach area, ask another person to assist you.  Contact your health care provider if you have redness, swelling, or pain around your drain area.  This information is not intended to replace advice given to you by your health care provider. Make sure you discuss any questions you have with your health care provider.  Document Revised: 02/22/2023 Document Reviewed: 01/22/2020  Elsevier Patient Education © 2023 [employer]

## 2024-05-25 NOTE — ANESTHESIA PROCEDURE NOTES
Airway    Date/Time: 5/25/2024 11:38 AM    Performed by: Jasen Eugene M.D.  Authorized by: Jasen Eugene M.D.    Location:  OR  Urgency:  Elective  Difficult Airway: No    Indications for Airway Management:  Anesthesia      Spontaneous Ventilation: absent    Sedation Level:  Deep  Preoxygenated: Yes    Patient Position:  Sniffing  Final Airway Type:  Endotracheal airway  Final Endotracheal Airway:  ETT  Cuffed: Yes    Technique Used for Successful ETT Placement:  Direct laryngoscopy  Devices/Methods Used in Placement:  Intubating stylet    Insertion Site:  Oral  Blade Type:  Ibeth  Laryngoscope Blade/Videolaryngoscope Blade Size:  4  ETT Size (mm):  7.5  Measured from:  Teeth  ETT to Teeth (cm):  23  Placement Verified by: auscultation and capnometry    Cormack-Lehane Classification:  Grade I - full view of glottis  Number of Attempts at Approach:  1

## 2024-05-25 NOTE — ED TRIAGE NOTES
Chief Complaint   Patient presents with    Abdominal Pain     Abdominal pain started 12MN; mid and radiating to right side; state pain also radiates to his right neck; denies N/V; Patient working at [employer] states pain is unbearable right now. 7/10     Accompanied by 2 RN's from floor via WC; AOX4 GCS15

## 2024-05-25 NOTE — ED NOTES
Med rec is complete per Patient at bedside.       Allergies reviewed.    Has patient had any outside antibiotics in the last 30 days? N    Any Anticoagulants (rivaroxaban, apixaban, edoxaban, dabigatran, warfarin, enoxaparin) taken in the last 14 days? N         Pharmacy/Pharmacies Pt utilizes : [facility] [phone]

## 2024-05-25 NOTE — H&P
CHIEF COMPLAINT: acute cholecystitis     HISTORY OF PRESENT ILLNESS: The patient is a 29 year-old White man who presents to the Emergency Department a six-hour history of severe right upper quadrant  abdominal pain. The pain is associated with fever, diaphoresis, and malaise. He endorses a three-week his of similar less severe symptoms, this is his third presentation to the ER for his symptoms. He reports intermittent precipitation and exacerbation of symptoms with ingestion of all types of foods.  He admits a family history of gallstones. The patient denies any recent or intercurrent illness. The patient denies any history of previous abdominal surgery.    PAST MEDICAL HISTORY:  has a past medical history of Asthma, Kidney stone, and Kidney stone.    PAST SURGICAL HISTORY:  has no past surgical history on file.    ALLERGIES: No Known Allergies    CURRENT MEDICATIONS:    Home Medications       Reviewed by Harish Cedillo (Pharmacy Tech) on 05/25/24 at 0505  Med List Status: Complete     Medication Last Dose Status   albuterol 108 (90 Base) MCG/ACT Aero Soln inhalation aerosol <3 weeks Active   dicyclomine (BENTYL) 10 MG Cap NEW Active   HYDROcodone-acetaminophen (NORCO) 5-325 MG Tab per tablet 5-6 days ago Active   ibuprofen (MOTRIN) 200 MG Tab 5/23/2024 Active   omeprazole (PRILOSEC) 40 MG delayed-release capsule 5/24/2024 Active   ondansetron (ZOFRAN) 4 MG Tab tablet 5/11/2024 Active   sucralfate (CARAFATE) 1 GM/10ML Suspension 5/23/2024 Active                  Audit from Redirected Encounters    **Home medications have not yet been reviewed for this encounter**         FAMILY HISTORY: family history includes Cancer in his mother.    SOCIAL HISTORY:  reports that he has never smoked. He has never used smokeless tobacco. He reports that he does not currently use alcohol. He reports that he does not use drugs.    REVIEW OF SYSTEMS: Comprehensive review of systems is negative with the exception of the  "aforementioned HPI, PMH, and PSH bullets in accordance with CMS guidelines.    PHYSICAL EXAMINATION:      Constitutional:     Vital Signs: /83   Pulse 85   Temp 36.7 °C (98 °F)   Resp 17   Ht 1.727 m (5' 8\")   Wt 109 kg (240 lb)   SpO2 94%    General Appearance: is in no apparent distress.  HEENT: The pupils are equal, round, and reactive to light bilaterally. The extraocular muscles are intact bilaterally. The sclera are anicteric. Nares and oropharynx are clear.   Neck: Supple. No adenopathy.  Respiratory:   Inspection: Unlabored respirations, no intercostal retractions, paradoxical motion, or accessory muscle use.   Auscultation: normal.  Cardiovascular:   Inspection: The skin is warm and well purfused.  Auscultation: Regular rate and rhythm.   Peripheral Pulses: Normal.   Abdomen:  Inspection: Abdominal inspection reveals no abrasions, contusions, lacerations or penetrating wounds.   Palpation: Palpation is remarkable for moderate tenderness in the right upper quadrant  region. No abdominal wall hernias.  Extremities:   Examination of the upper and lower extremities demonstrates no cyanosis edema or clubbing.  Neurologic:   Alert & oriented to person, time and place. Normal motor function. Normal sensory function. No focal deficits noted.    LABORATORY VALUES:   Recent Labs     05/25/24  0452   WBC 11.6*   RBC 5.33   HEMOGLOBIN 15.8   HEMATOCRIT 47.7   MCV 89.5   MCH 29.6   MCHC 33.1   RDW 42.0   PLATELETCT 637*   MPV 9.0     Recent Labs     05/25/24  0452   SODIUM 139   POTASSIUM 3.8   CHLORIDE 102   CO2 21   GLUCOSE 100*   BUN 9   CREATININE 0.84   CALCIUM 9.0     Recent Labs     05/25/24  0452   ASTSGOT 201*   ALTSGPT 142*   TBILIRUBIN 1.3   ALKPHOSPHAT 129*   GLOBULIN 3.4            IMAGING:   CT-ABDOMEN-PELVIS WITH   Final Result         1. Gallbladder wall thickening with adjacent inflammation and mild pericholecystic fluid. Therefore, suggesting acute cholecystitis, recommend follow-up right " upper quadrant ultrasound.   2. Otherwise, negative.      US-RUQ    (Results Pending)       ASSESSMENT AND PLAN:     * Acute cholecystitis- (present on admission)  Assessment & Plan  Three-week history of intermittent right upper quadrant pain sometimes provoked by eating.  Now presenting with six-hours of progressive pain associated with fevers, diaphoresis, and malaise.  Tender to palpation in the right upper quadrant.  Labs with leukocytosis, mild elevation in LFTs.  Ultrasound with gallbladder wall thickening, pericholecystic fluid, and gallstones consistent with acute cholecystitis.  To OR for laparoscopic, possible open, cholecystectomy.        The patient has Grade I (mild) acute cholecystitis. Plan: Laparoscopic cholecystectomy.    The patient will be taken to the operating room for Laparoscopic cholecystectomy. The surgical conduct was discussed in detail. Potential complications including, but not limited to, infection, bleeding, damage to adjacent structures, bile duct injury, need to convert to an open procedure, and anesthetic complications were discussed. Questions were elicited and answered to the patient's satisfaction.  Operative consent signed.     DISPOSITION: Admit [facility].     ____________________________________     Stephen Rseendiz M.D.    DD: 5/25/2024  6:22 AM    [place] Guidelines for Acute Cholecystitis 2018  AAST Grading System for EGS Conditions  ACS NSQIP Surgical Risk Calculator

## 2024-05-25 NOTE — ANESTHESIA PREPROCEDURE EVALUATION
Case: [id number] Anesthesia Start Date/Time: 05/25/24 1133    Procedure: CHOLECYSTECTOMY, LAPAROSCOPIC    Anesthesia type: general    Pre-op diagnosis: Acute Cholecystitis    Location: [facility] OR  / [facility]    Surgeons: Gerry Barry M.D.            Obesity  Asthma  GERD    Relevant Problems   Other   (positive) Acute cholecystitis       Physical Exam    Airway   Mallampati: II  TM distance: >3 FB  Neck ROM: full       Cardiovascular - normal exam  Rhythm: regular  Rate: normal  (-) murmur     Dental - normal exam           Pulmonary - normal exam  Breath sounds clear to auscultation     Abdominal    Neurological - normal exam                   Anesthesia Plan    ASA 2       Plan - general       Airway plan will be ETT          Induction: intravenous    Postoperative Plan: Postoperative administration of opioids is intended.    Pertinent diagnostic labs and testing reviewed    Informed Consent:    Anesthetic plan and risks discussed with patient and mother.

## 2024-05-26 ENCOUNTER — APPOINTMENT (OUTPATIENT)
Dept: RADIOLOGY | Facility: MEDICAL CENTER | Age: 29
End: 2024-05-26
Attending: REGISTERED NURSE
Payer: COMMERCIAL

## 2024-05-26 ENCOUNTER — APPOINTMENT (OUTPATIENT)
Dept: RADIOLOGY | Facility: MEDICAL CENTER | Age: 29
End: 2024-05-26
Attending: SURGERY
Payer: COMMERCIAL

## 2024-05-26 PROBLEM — K91.89 POSTOPERATIVE BILE LEAK: Status: ACTIVE | Noted: 2024-05-26

## 2024-05-26 PROBLEM — K83.8 POSTOPERATIVE BILE LEAK: Status: ACTIVE | Noted: 2024-05-26

## 2024-05-26 LAB
ALBUMIN SERPL BCP-MCNC: 3.5 G/DL (ref 3.2–4.9)
ALBUMIN/GLOB SERPL: 1.3 G/DL
ALP SERPL-CCNC: 123 U/L (ref 30–99)
ALT SERPL-CCNC: 132 U/L (ref 2–50)
ANION GAP SERPL CALC-SCNC: 10 MMOL/L (ref 7–16)
AST SERPL-CCNC: 72 U/L (ref 12–45)
BASOPHILS # BLD AUTO: 0.1 % (ref 0–1.8)
BASOPHILS # BLD: 0.01 K/UL (ref 0–0.12)
BILIRUB SERPL-MCNC: 0.4 MG/DL (ref 0.1–1.5)
BUN SERPL-MCNC: 6 MG/DL (ref 8–22)
CALCIUM ALBUM COR SERPL-MCNC: 8.9 MG/DL (ref 8.5–10.5)
CALCIUM SERPL-MCNC: 8.5 MG/DL (ref 8.5–10.5)
CHLORIDE SERPL-SCNC: 105 MMOL/L (ref 96–112)
CO2 SERPL-SCNC: 23 MMOL/L (ref 20–33)
CREAT SERPL-MCNC: 0.86 MG/DL (ref 0.5–1.4)
EOSINOPHIL # BLD AUTO: 0 K/UL (ref 0–0.51)
EOSINOPHIL NFR BLD: 0 % (ref 0–6.9)
ERYTHROCYTE [DISTWIDTH] IN BLOOD BY AUTOMATED COUNT: 43.3 FL (ref 35.9–50)
GFR SERPLBLD CREATININE-BSD FMLA CKD-EPI: 120 ML/MIN/1.73 M 2
GLOBULIN SER CALC-MCNC: 2.7 G/DL (ref 1.9–3.5)
GLUCOSE BLD STRIP.AUTO-MCNC: 125 MG/DL (ref 65–99)
GLUCOSE BLD STRIP.AUTO-MCNC: 94 MG/DL (ref 65–99)
GLUCOSE SERPL-MCNC: 118 MG/DL (ref 65–99)
HCT VFR BLD AUTO: 42.5 % (ref 42–52)
HGB BLD-MCNC: 14.1 G/DL (ref 14–18)
IMM GRANULOCYTES # BLD AUTO: 0.12 K/UL (ref 0–0.11)
IMM GRANULOCYTES NFR BLD AUTO: 1 % (ref 0–0.9)
LYMPHOCYTES # BLD AUTO: 0.88 K/UL (ref 1–4.8)
LYMPHOCYTES NFR BLD: 7.4 % (ref 22–41)
MCH RBC QN AUTO: 29.8 PG (ref 27–33)
MCHC RBC AUTO-ENTMCNC: 33.2 G/DL (ref 32.3–36.5)
MCV RBC AUTO: 89.9 FL (ref 81.4–97.8)
MONOCYTES # BLD AUTO: 0.61 K/UL (ref 0–0.85)
MONOCYTES NFR BLD AUTO: 5.1 % (ref 0–13.4)
MYCOBACTERIUM SPEC CULT: NORMAL
NEUTROPHILS # BLD AUTO: 10.23 K/UL (ref 1.82–7.42)
NEUTROPHILS NFR BLD: 86.4 % (ref 44–72)
NRBC # BLD AUTO: 0 K/UL
NRBC BLD-RTO: 0 /100 WBC (ref 0–0.2)
PLATELET # BLD AUTO: 527 K/UL (ref 164–446)
PMV BLD AUTO: 8.9 FL (ref 9–12.9)
POTASSIUM SERPL-SCNC: 4.7 MMOL/L (ref 3.6–5.5)
PROT SERPL-MCNC: 6.2 G/DL (ref 6–8.2)
RBC # BLD AUTO: 4.73 M/UL (ref 4.7–6.1)
RHODAMINE-AURAMINE STN SPEC: NORMAL
RHODAMINE-AURAMINE STN SPEC: NORMAL
SIGNIFICANT IND 70042: NORMAL
SIGNIFICANT IND 70042: NORMAL
SITE SITE: NORMAL
SITE SITE: NORMAL
SODIUM SERPL-SCNC: 138 MMOL/L (ref 135–145)
SOURCE SOURCE: NORMAL
SOURCE SOURCE: NORMAL
WBC # BLD AUTO: 11.9 K/UL (ref 4.8–10.8)

## 2024-05-26 PROCEDURE — 85025 COMPLETE CBC W/AUTO DIFF WBC: CPT

## 2024-05-26 PROCEDURE — 700105 HCHG RX REV CODE 258: Performed by: SURGERY

## 2024-05-26 PROCEDURE — 74160 CT ABDOMEN W/CONTRAST: CPT

## 2024-05-26 PROCEDURE — 36415 COLL VENOUS BLD VENIPUNCTURE: CPT

## 2024-05-26 PROCEDURE — 96376 TX/PRO/DX INJ SAME DRUG ADON: CPT

## 2024-05-26 PROCEDURE — 700111 HCHG RX REV CODE 636 W/ 250 OVERRIDE (IP): Mod: JZ | Performed by: REGISTERED NURSE

## 2024-05-26 PROCEDURE — 82962 GLUCOSE BLOOD TEST: CPT

## 2024-05-26 PROCEDURE — 99024 POSTOP FOLLOW-UP VISIT: CPT | Performed by: REGISTERED NURSE

## 2024-05-26 PROCEDURE — 700117 HCHG RX CONTRAST REV CODE 255: Performed by: SURGERY

## 2024-05-26 PROCEDURE — 700111 HCHG RX REV CODE 636 W/ 250 OVERRIDE (IP): Performed by: REGISTERED NURSE

## 2024-05-26 PROCEDURE — A9537 TC99M MEBROFENIN: HCPCS

## 2024-05-26 PROCEDURE — 770001 HCHG ROOM/CARE - MED/SURG/GYN PRIV*

## 2024-05-26 PROCEDURE — 700101 HCHG RX REV CODE 250: Performed by: REGISTERED NURSE

## 2024-05-26 PROCEDURE — 99254 IP/OBS CNSLTJ NEW/EST MOD 60: CPT | Performed by: INTERNAL MEDICINE

## 2024-05-26 PROCEDURE — 700102 HCHG RX REV CODE 250 W/ 637 OVERRIDE(OP): Performed by: SURGERY

## 2024-05-26 PROCEDURE — 80053 COMPREHEN METABOLIC PANEL: CPT

## 2024-05-26 PROCEDURE — A9270 NON-COVERED ITEM OR SERVICE: HCPCS | Performed by: SURGERY

## 2024-05-26 PROCEDURE — 700111 HCHG RX REV CODE 636 W/ 250 OVERRIDE (IP): Mod: JZ | Performed by: SURGERY

## 2024-05-26 RX ADMIN — FAMOTIDINE 20 MG: 20 TABLET, FILM COATED ORAL at 05:02

## 2024-05-26 RX ADMIN — ENOXAPARIN SODIUM 40 MG: 100 INJECTION SUBCUTANEOUS at 17:14

## 2024-05-26 RX ADMIN — METRONIDAZOLE 500 MG: 500 INJECTION, SOLUTION INTRAVENOUS at 21:29

## 2024-05-26 RX ADMIN — ACETAMINOPHEN 1000 MG: 500 TABLET, FILM COATED ORAL at 23:02

## 2024-05-26 RX ADMIN — ACETAMINOPHEN 1000 MG: 500 TABLET, FILM COATED ORAL at 05:01

## 2024-05-26 RX ADMIN — OXYCODONE HYDROCHLORIDE 5 MG: 5 TABLET ORAL at 02:45

## 2024-05-26 RX ADMIN — IOHEXOL 100 ML: 350 INJECTION, SOLUTION INTRAVENOUS at 09:38

## 2024-05-26 RX ADMIN — CEFTRIAXONE SODIUM 2000 MG: 10 INJECTION, POWDER, FOR SOLUTION INTRAVENOUS at 05:02

## 2024-05-26 RX ADMIN — SODIUM CHLORIDE, POTASSIUM CHLORIDE, SODIUM LACTATE AND CALCIUM CHLORIDE: 600; 310; 30; 20 INJECTION, SOLUTION INTRAVENOUS at 08:36

## 2024-05-26 RX ADMIN — FAMOTIDINE 20 MG: 20 TABLET, FILM COATED ORAL at 17:14

## 2024-05-26 RX ADMIN — OXYCODONE HYDROCHLORIDE 5 MG: 5 TABLET ORAL at 11:45

## 2024-05-26 RX ADMIN — SODIUM CHLORIDE, POTASSIUM CHLORIDE, SODIUM LACTATE AND CALCIUM CHLORIDE: 600; 310; 30; 20 INJECTION, SOLUTION INTRAVENOUS at 20:18

## 2024-05-26 RX ADMIN — OXYCODONE HYDROCHLORIDE 5 MG: 5 TABLET ORAL at 08:39

## 2024-05-26 RX ADMIN — METRONIDAZOLE 500 MG: 500 INJECTION, SOLUTION INTRAVENOUS at 05:08

## 2024-05-26 RX ADMIN — ACETAMINOPHEN 1000 MG: 500 TABLET, FILM COATED ORAL at 17:14

## 2024-05-26 RX ADMIN — ACETAMINOPHEN 1000 MG: 500 TABLET, FILM COATED ORAL at 11:19

## 2024-05-26 RX ADMIN — METRONIDAZOLE 500 MG: 500 INJECTION, SOLUTION INTRAVENOUS at 13:13

## 2024-05-26 RX ADMIN — DOCUSATE SODIUM 100 MG: 100 CAPSULE, LIQUID FILLED ORAL at 17:15

## 2024-05-26 ASSESSMENT — ENCOUNTER SYMPTOMS
CARDIOVASCULAR NEGATIVE: 1
ABDOMINAL PAIN: 1
CHILLS: 1
MUSCULOSKELETAL NEGATIVE: 1
RESPIRATORY NEGATIVE: 1
NEUROLOGICAL NEGATIVE: 1
FEVER: 0

## 2024-05-26 ASSESSMENT — PAIN DESCRIPTION - PAIN TYPE
TYPE: ACUTE PAIN

## 2024-05-26 NOTE — CARE PLAN
The patient is Stable - Low risk of patient condition declining or worsening    Shift Goals  Clinical Goals: pain control, ABX, IVF  Patient Goals: rest, pain control  Family Goals: n/a    Progress made toward(s) clinical / shift goals:  Pt medicated per MAR, resting. ABX in place, IVF in place. Pt ambulating to bathroom. Pain managed through orders. Updated pt on POC. Care ongoing.    Patient is not progressing towards the following goals:

## 2024-05-26 NOTE — ASSESSMENT & PLAN NOTE
5/26 drain output bilious - 230 ml  HIDA scan shows: Brisk bile leak originating in the gallbladder fossa, tracking into the RIGHT lateral abdomen and into surgical drain.   GI consult with plan for ERCP tomorrow.   NPO after MN  5/27 ERCP with sphincterotomy and biliary stent placment  -No NSAIDs x 5 days.  -Follow up in 3 months with GI

## 2024-05-26 NOTE — PROGRESS NOTES
Received report from PACU RN  Assessment complete.  A&O x 4. Patient calls appropriately.  Patient ambulates with standby assist. Patient has 0/10 pain. Pain managed with prescribed medications.  Denies N&V. Tolerating diet.  Skin per flowsheets.  + void, + flatus, - BM.  Patient denies SOB.  SCD's on.  Patient pleasant and cooperative with plan of care.  Review plan with of care with patient. Call light and personal belongings with in reach. Hourly rounding in place. All needs met at this time.

## 2024-05-26 NOTE — CONSULTS
Gastroenterology Initial Consult Note               Author:  Marii Mcclain M.D. Date & Time Created: 5/26/2024 12:22 PM       Patient ID:  Name:             Jayden Nielsen  YOB: 1995  Age:                 29 y.o.  male  MRN:               [id number]      Referring Provider:  Gerry Barry MD        Presenting Chief Complaint:  Possible bile leak      History of Present Illness:    This is a very pleasant 29 y.o. male who presented to the ER yesterday with severe right upper quadrant pain associated with diaphoresis, malaise and fever.  This was his third presentation to the ER for the symptoms.  He was taken to the OR yesterday afternoon for acute cholecystitis and was found to have gangrenous cholecystitis and a liver abscess.  It was a very difficult operative procedure.  A right upper quadrant subhepatic drain was left in place.  The surgeon mentioned that he saw right upper quadrant bile staining.  He underwent CT abdomen with contrast this morning and there was no fluid or abscess in the gallbladder fossa.  However, his drain is with bilious output of 240 mL.  HIDA scan shows brisk bile leak.        Review of Systems:  Review of Systems   Constitutional:  Positive for chills. Negative for fever.   Respiratory: Negative.     Cardiovascular: Negative.    Gastrointestinal:  Positive for abdominal pain.   Genitourinary: Negative.    Musculoskeletal: Negative.    Skin: Negative.    Neurological: Negative.    Endo/Heme/Allergies: Negative.              Past Medical History:  Past Medical History:   Diagnosis Date    Asthma     Kidney stone     Kidney stone      Active Hospital Problems    Diagnosis     Acute gangrenous cholecystitis [K81.0]          Past Surgical History:  History reviewed. No pertinent surgical history.        Hospital Medications:  Current Facility-Administered Medications   Medication Dose Frequency Provider Last Rate Last Admin    Respiratory Therapy Consult   Continuous  RT Stephen Resendiz M.D.        Pharmacy Consult Request ...Pain Management Review 1 Each  1 Each PHARMACY TO DOSE Stephen Resendiz M.D.        ondansetron (Zofran) syringe/vial injection 4 mg  4 mg Q4HRS PRN Stephen Resendiz M.D.        docusate sodium (Colace) capsule 100 mg  100 mg BID Stephen Resendiz M.D.        senna-docusate (Pericolace Or Senokot S) 8.6-50 MG per tablet 1 Tablet  1 Tablet Nightly Stephen Resendiz M.D.        senna-docusate (Pericolace Or Senokot S) 8.6-50 MG per tablet 1 Tablet  1 Tablet Q24HRS PRN Stephen Resendiz M.D.        polyethylene glycol/lytes (Miralax) Packet 1 Packet  1 Packet BID Stephen Resendiz M.D.        magnesium hydroxide (Milk Of Magnesia) suspension 30 mL  30 mL DAILY AT 1800 Stephen Resendiz M.D.        bisacodyl (Dulcolax) suppository 10 mg  10 mg Q24HRS PRN Stephen Resendiz M.D.        sodium phosphate (Fleet) enema 133 mL  1 Each Once PRN Stephen Resendiz M.D.        LR infusion   Continuous Stephen Resendiz M.D. 125 mL/hr at 05/26/24 0836 New Bag at 05/26/24 0836    enoxaparin (Lovenox) inj 40 mg  40 mg DAILY AT 1800 Stephen Resendiz M.D.        acetaminophen (Tylenol) tablet 1,000 mg  1,000 mg Q6HRS Stephen Resendiz M.D.   1,000 mg at 05/26/24 1119    Followed by    [START ON 5/30/2024] acetaminophen (Tylenol) tablet 1,000 mg  1,000 mg Q6HRS PRN Stephen Resendiz M.D.        oxyCODONE immediate-release (Roxicodone) tablet 5 mg  5 mg Q3HRS PRN Stephen Resendiz M.D.   5 mg at 05/26/24 1145    Or    oxyCODONE immediate release (Roxicodone) tablet 10 mg  10 mg Q3HRS PRN Stephen Resendiz M.D.        Or    HYDROmorphone (Dilaudid) injection 0.5 mg  0.5 mg Q3HRS PRN Stephen Resendiz M.D.   0.5 mg at 05/25/24 0749    famotidine (Pepcid) tablet 20 mg  20 mg BID Stephen Resendiz M.D.   20 mg at 05/26/24 0502    Or    famotidine (Pepcid) injection 20 mg  20 mg BID Stephen Resendiz M.D.        cefTRIAXone (Rocephin) syringe 2,000 mg  2,000 mg Q24HRS Myra Clarke A.P.R.N.   2,000 mg at 05/26/24 0502    metroNIDAZOLE (Flagyl) IVPB 500 mg  500 mg  Q8HRS ARMANI Win   Stopped at 05/26/24 0608    albuterol inhaler 2 Puff  2 Puff Q4HRS DORYS Resendiz M.D.       Last reviewed on 5/25/2024 10:17 AM by Deidra Burkett R.N.       Current Outpatient Medications:  Medications Prior to Admission   Medication Sig Dispense Refill Last Dose    HYDROcodone-acetaminophen (NORCO) 5-325 MG Tab per tablet Take 1 Tablet by mouth 3 times a day as needed. Indications: Pain   5-6 days ago at [facility]    ibuprofen (MOTRIN) 200 MG Tab Take 400 mg by mouth 1 time a day as needed for Mild Pain.   5/23/2024 at [facility]    bismuth subsalicylate (PEPTO-BISMOL) 262 MG/15ML Suspension Take 15 mL by mouth 2 times a day as needed.   <2 weeks at [facility]    omeprazole (PRILOSEC) 40 MG delayed-release capsule Take 1 Capsule by mouth every day. 30 Capsule 3 5/24/2024 at 1000    sucralfate (CARAFATE) 1 GM/10ML Suspension Take 10 mL by mouth every 6 hours as needed (abdominal pain). 420 mL 0 5/23/2024 at [facility]    albuterol 108 (90 Base) MCG/ACT Aero Soln inhalation aerosol Inhale 2 Puffs every 6 hours as needed for Shortness of Breath. 8.5 g 0 <3 weeks at [facility]    ondansetron (ZOFRAN) 4 MG Tab tablet Take 1 Tablet by mouth every four hours as needed for Nausea/Vomiting. 15 Tablet 0 5/11/2024 at [facility]    dicyclomine (BENTYL) 10 MG Cap Take 1 Capsule by mouth 4 Times a Day,Before Meals and at Bedtime. 120 Capsule 0 NEW         Medication Allergies:  No Known Allergies      Family Medical History:  Family History   Problem Relation Age of Onset    Cancer Mother         ?breast         Social History:  Social History     Socioeconomic History    Marital status: Single     Spouse name: Not on file    Number of children: Not on file    Years of education: Not on file    Highest education level: Bachelor's degree (e.g., BA, AB, BS)   Occupational History    Not on file   Tobacco Use    Smoking status: Never    Smokeless tobacco: Never   Vaping Use    Vaping status: Never Used   Substance and Sexual  Activity    Alcohol use: Not Currently     Comment: socially, maybe 2 drinks a year    Drug use: No    Sexual activity: Not Currently     Partners: Male     Birth control/protection: Abstinence   Other Topics Concern    Not on file   Social History Narrative    Not on file     Social Determinants of Health     Financial Resource Strain: Low Risk  (5/7/2024)    Overall Financial Resource Strain (CARDIA)     Difficulty of Paying Living Expenses: Not hard at all   Food Insecurity: No Food Insecurity (5/25/2024)    Hunger Vital Sign     Worried About Running Out of Food in the Last Year: Never true     Ran Out of Food in the Last Year: Never true   Transportation Needs: No Transportation Needs (5/25/2024)    PRAPARE - Transportation     Lack of Transportation (Medical): No     Lack of Transportation (Non-Medical): No   Physical Activity: Insufficiently Active (5/7/2024)    Exercise Vital Sign     Days of Exercise per Week: 4 days     Minutes of Exercise per Session: 30 min   Stress: Stress Concern Present (5/7/2024)    [employer] - Occupational Stress Questionnaire     Feeling of Stress : To some extent   Social Connections: Socially Isolated (5/7/2024)    Social Connection and Isolation Panel [NHANES]     Frequency of Communication with Friends and Family: Three times a week     Frequency of Social Gatherings with Friends and Family: Three times a week     Attends Rastafari Services: Never     Active Member of Clubs or Organizations: No     Attends Club or Organization Meetings: Patient declined     Marital Status: Never    Intimate Partner Violence: Not At Risk (5/25/2024)    Humiliation, Afraid, Rape, and Kick questionnaire     Fear of Current or Ex-Partner: No     Emotionally Abused: No     Physically Abused: No     Sexually Abused: No   Housing Stability: Low Risk  (5/25/2024)    Housing Stability Vital Sign     Unable to Pay for Housing in the Last Year: No     Number of Places  "Lived in the Last Year: 1     Unstable Housing in the Last Year: No         Vital signs:  Weight/BMI: Body mass index is 36.49 kg/m².  /87   Pulse 85   Temp 36.6 °C (97.9 °F) (Temporal)   Resp 18   Ht 1.727 m (5' 8\")   Wt 109 kg (240 lb)   SpO2 96%   Vitals:    05/25/24 2315 05/26/24 0333 05/26/24 0715 05/26/24 1130   BP: 131/73 135/80 139/87    Pulse: 89 77 79 85   Resp: 17 18 18 18   Temp: 36.6 °C (97.9 °F) 36.7 °C (98.1 °F) 36.6 °C (97.9 °F) 36.6 °C (97.9 °F)   TempSrc: Temporal Temporal Temporal Temporal   SpO2: 95% 94% 97% 96%   Weight:       Height:         Oxygen Therapy:  Pulse Oximetry: 96 %, O2 (LPM): 0, O2 Delivery Device: None - Room Air    Intake/Output Summary (Last 24 hours) at 5/26/2024 1222  Last data filed at 5/26/2024 1130  Gross per 24 hour   Intake 3275.74 ml   Output 1285 ml   Net 1990.74 ml         Physical Exam:  Physical Exam  Constitutional:       General: He is not in acute distress.     Appearance: He is obese. He is not toxic-appearing.   HENT:      Head: Normocephalic and atraumatic.      Nose: Nose normal.      Mouth/Throat:      Mouth: Mucous membranes are moist.   Eyes:      General: No scleral icterus.  Cardiovascular:      Rate and Rhythm: Regular rhythm.      Heart sounds: Normal heart sounds.   Pulmonary:      Effort: Pulmonary effort is normal.      Breath sounds: Normal breath sounds.   Abdominal:      General: Bowel sounds are normal.      Palpations: Abdomen is soft.   Musculoskeletal:         General: Normal range of motion.   Skin:     General: Skin is warm and dry.   Neurological:      Mental Status: He is alert and oriented to person, place, and time.                 Labs:  Recent Labs     05/25/24  0452 05/26/24  0208   SODIUM 139 138   POTASSIUM 3.8 4.7   CHLORIDE 102 105   CO2 21 23   BUN 9 6*   CREATININE 0.84 0.86   CALCIUM 9.0 8.5     Recent Labs     05/25/24  0452 05/26/24  0208   ALTSGPT 142* 132*   ASTSGOT 201* 72*   ALKPHOSPHAT 129* 123*   TBILIRUBIN " 1.3 0.4   LIPASE 46  --    GLUCOSE 100* 118*     Recent Labs     05/25/24  0452 05/26/24  0208   WBC 11.6* 11.9*   NEUTSPOLYS 69.10 86.40*   LYMPHOCYTES 23.50 7.40*   MONOCYTES 5.10 5.10   EOSINOPHILS 0.50 0.00   BASOPHILS 0.40 0.10   ASTSGOT 201* 72*   ALTSGPT 142* 132*   ALKPHOSPHAT 129* 123*   TBILIRUBIN 1.3 0.4     Recent Labs     05/25/24  0452 05/26/24  0208   RBC 5.33 4.73   HEMOGLOBIN 15.8 14.1   HEMATOCRIT 47.7 42.5   PLATELETCT 637* 527*     Recent Results (from the past 24 hour(s))   POCT glucose device results    Collection Time: 05/25/24 11:44 PM   Result Value Ref Range    POC Glucose, Blood 125 (H) 65 - 99 mg/dL   CBC with Differential: Tomorrow AM    Collection Time: 05/26/24  2:08 AM   Result Value Ref Range    WBC 11.9 (H) 4.8 - 10.8 K/uL    RBC 4.73 4.70 - 6.10 M/uL    Hemoglobin 14.1 14.0 - 18.0 g/dL    Hematocrit 42.5 42.0 - 52.0 %    MCV 89.9 81.4 - 97.8 fL    MCH 29.8 27.0 - 33.0 pg    MCHC 33.2 32.3 - 36.5 g/dL    RDW 43.3 35.9 - 50.0 fL    Platelet Count 527 (H) 164 - 446 K/uL    MPV 8.9 (L) 9.0 - 12.9 fL    Neutrophils-Polys 86.40 (H) 44.00 - 72.00 %    Lymphocytes 7.40 (L) 22.00 - 41.00 %    Monocytes 5.10 0.00 - 13.40 %    Eosinophils 0.00 0.00 - 6.90 %    Basophils 0.10 0.00 - 1.80 %    Immature Granulocytes 1.00 (H) 0.00 - 0.90 %    Nucleated RBC 0.00 0.00 - 0.20 /100 WBC    Neutrophils (Absolute) 10.23 (H) 1.82 - 7.42 K/uL    Lymphs (Absolute) 0.88 (L) 1.00 - 4.80 K/uL    Monos (Absolute) 0.61 0.00 - 0.85 K/uL    Eos (Absolute) 0.00 0.00 - 0.51 K/uL    Baso (Absolute) 0.01 0.00 - 0.12 K/uL    Immature Granulocytes (abs) 0.12 (H) 0.00 - 0.11 K/uL    NRBC (Absolute) 0.00 K/uL   Comp Metabolic Panel (CMP): Tomorrow AM    Collection Time: 05/26/24  2:08 AM   Result Value Ref Range    Sodium 138 135 - 145 mmol/L    Potassium 4.7 3.6 - 5.5 mmol/L    Chloride 105 96 - 112 mmol/L    Co2 23 20 - 33 mmol/L    Anion Gap 10.0 7.0 - 16.0    Glucose 118 (H) 65 - 99 mg/dL    Bun 6 (L) 8 - 22 mg/dL     Creatinine 0.86 0.50 - 1.40 mg/dL    Calcium 8.5 8.5 - 10.5 mg/dL    Correct Calcium 8.9 8.5 - 10.5 mg/dL    AST(SGOT) 72 (H) 12 - 45 U/L    ALT(SGPT) 132 (H) 2 - 50 U/L    Alkaline Phosphatase 123 (H) 30 - 99 U/L    Total Bilirubin 0.4 0.1 - 1.5 mg/dL    Albumin 3.5 3.2 - 4.9 g/dL    Total Protein 6.2 6.0 - 8.2 g/dL    Globulin 2.7 1.9 - 3.5 g/dL    A-G Ratio 1.3 g/dL   ESTIMATED GFR    Collection Time: 05/26/24  2:08 AM   Result Value Ref Range    GFR (CKD-EPI) 120 >60 mL/min/1.73 m 2   POCT glucose device results    Collection Time: 05/26/24  5:11 AM   Result Value Ref Range    POC Glucose, Blood 94 65 - 99 mg/dL         Radiology Review:  CT-ABDOMEN WITH   Final Result         1. Post cholecystectomy. No abscess.   2. Splenomegaly.   3. Hepatic steatosis.   4. Small bilateral nonobstructing renal stones.               US-RUQ   Final Result         1. Positive for severe gallbladder wall thickening, pericholecystic fluid, gallbladder distention, and cholelithiasis. Therefore, consistent with acute cholecystitis.   2. Hepatic steatosis.      CT-ABDOMEN-PELVIS WITH   Final Result         1. Gallbladder wall thickening with adjacent inflammation and mild pericholecystic fluid. Therefore, suggesting acute cholecystitis, recommend follow-up right upper quadrant ultrasound.   2. Otherwise, negative.      NM-HEPATOBILIARY SCAN    (Results Pending)         MDM (Data Review):   -Records reviewed and summarized in current documentation  -I personally reviewed and interpreted the laboratory results  -I personally reviewed the radiology images    Assessment/Recommendations:    IMPRESSION:    Acute gangrenous cholecystitis status postcholecystectomy  Rule out bile leak  Abnormal liver tests, improved  Leukocytosis  Hematuria on UA    RECS:  ERCP with Dr. Lacy at 1pm tomorrow  On Lovenox--hold dose tomorrow  On ceftriaxone  NPO        Marii Mcclain M.D.          Core Quality Measures   Reviewed items:  Labs,  Medications and Radiology reports reviewed

## 2024-05-26 NOTE — PROGRESS NOTES
4 Eyes Skin Assessment Completed by CELI Joy and CELI Archer.    Head WDL  Ears WDL  Nose WDL  Mouth WDL  Neck WDL  Breast/Chest WDL  Shoulder Blades WDL  Spine WDL  (R) Arm/Elbow/Hand WDL  (L) Arm/Elbow/Hand WDL  Abdomen x4 lap sites, LORENA drain with dressing in place  Groin WDL  Scrotum/Coccyx/Buttocks WDL  (R) Leg WDL  (L) Leg WDL  (R) Heel/Foot/Toe WDL  (L) Heel/Foot/Toe WDL          Devices In Places Blood Pressure Cuff, Pulse Ox, and SCD's      Interventions In Place Pillows and Pressure Redistribution Mattress    Possible Skin Injury No    Pictures Uploaded Into Epic N/A  Wound Consult Placed N/A  RN Wound Prevention Protocol Ordered No

## 2024-05-26 NOTE — OP REPORT
DATE OF SERVICE:  05/25/2024     PREOPERATIVE DIAGNOSIS:  Acute cholecystitis.     POSTOPERATIVE DIAGNOSES:    1.  Gangrenous cholecystitis.  2.  Liver abscess.     PROCEDURES PERFORMED:  1.  Laparoscopic cholecystectomy.  2.  Drainage of liver abscess.     ASSISTANT:  NARCISA Win     ANESTHESIOLOGIST:  Jasen Eugene MD     ANESTHESIA:  General endotracheal anesthesia.     ASA CLASS:  2.     INDICATIONS:  The patient is a 29-year-old male who presented to the emergency   department this morning with right upper quadrant pain and leukocytosis and   elevated liver enzymes.  He has had several weeks of right upper quadrant pain   with known history of gallstones, taken to the OR for laparoscopic   cholecystectomy.       Additional skilled operative assistance from advanced nurse practitioner was required.  Assistant   was present for the entire operation.  Surgical assistant performed following:  Assistance with   optimal surgical exposure of the operative field, did operate the camera, provided high complex   decision making input, operated the camera for the laparoscopic portion of the procedure, assisted   with the resection as well as fascial closure and perform skin closure and dressing application.     FINDINGS:  Dense intra-abdominal adhesions.  The gallbladder was completely   walled off with omentum.  There was bile staining in the upper abdomen   involving the edge of the liver as well as the right gutter lateral to the   liver.  There was marked inflammation of the gallbladder.  Liver was fatty in   appearance.  Gallbladder was markedly inflamed.  There were multiple areas of   necrosis including near the dome, the back wall into the liver, right lateral wall and infundibulum.      WOUND CLASSIFICATION: IV-  infection present at time of surgery with drainage of the liver abscess.     SPECIMEN:  Cultures obtained from the gallbladder as well as the gallbladder   for permanent.     BLOOD LOSS:   Estimated  mL.     DRAINS USED:  Right subhepatic 10 mm flat channel drain was placed at the end   of the case.     DESCRIPTION OF PROCEDURE:  Following informed consent, the patient was   properly identified, taken to the operating room, placed in supine position.    General anesthesia was administered.  Antibiotics had previously been   administered.  The patient had voided prior to surgery.  Sequentials were   present at time of surgery.  The abdomen was prepped and draped in sterile   fashion.  A timeout was called prior to proceeding.  Local anesthesia was   infiltrated above the umbilicus.  Transverse incision was made and blunt   dissection was used to identify the fascia.  Fascia incised with the 11 blade.    Clamps were inserted and spread and then, a 2-0 Vicryl suture was placed in   figure-of-eight fashion.  A 5 mm port was inserted without the use of a trocar   and the abdomen was insufflated.  On initial opening, there was clear bile   staining present on the edge of the liver as well as into the right upper   quadrant.  A 12 mm port was placed in the epigastric position just to the   right of the falciform ligament.  Two 5 mm ports placed in the right upper   quadrant below the costal margin after instillation of local anesthesia,   placed under direct vision.  With the ports in place, the dissection commenced   on the edge of the liver along the omentum.  With mobilizing the omentum, did   come across necrosis of the gallbladder with obvious bile leaking from the   gallbladder itself at this time.  Grasper was placed over this, trying to   minimize bile leakage as well as give some traction.  Finally able to slowly   dissect the gallbladder out with just blunt dissection.  Eventually was able   to score on the medial and lateral sides trying to increase mobility.  Second   grasper was able to be placed on the body.  Again, continued blunt dissection   was done.  Finally, following the  gallbladder down, the infundibulum was quite   buried.  There was some tearing of the capsule liver medial to the   gallbladder just with traction on the omentum.  This was easily treated with   cautery.  Gallbladder was markedly inflamed and distorted, again staying right   on the gallbladder itself.  Gallbladder was carefully brought up and blunt   dissection was used with the suction .  Finally identifying the infundibulum and   then tracing this back medially towards the liver.  Finally, came across structures   in this area.  Blunt dissection continued.  At this time now, a [place] dissector was   able to clearly identify two structures going to the gallbladder, potentially   third very small structure, potentially a posterior cystic artery identified.  There were multiple areas of necrosis along the gallbladder, but   leaking bile made mobilization very difficult.  Again, blunt dissection was   used in this area and I was now finally able to visualize part of the   posterior gallbladder wall.  I was able to now score the gallbladder laterally trying to   increase mobility.  With this, now I was able to get the critical view of two structures   in the neck of the gallbladder.  These were double clipped proximally and single clipped   on the gallbladder side and cut.  There was now markedly increased mobilization,   still very difficult in mobilizing secondary to multiple areas of necrosis.    As we were now working our way up the gallbladder, came across the necrotic   posterior gallbladder wall that eroded into the liver with danyelle pus draining   from the liver itself.  Dissection continued medial and lateral to this.    Finally, freeing up the edges of the gallbladder, working to the dome.  The   gallbladder was completely freed.  The area of the abscess again was   thoroughly irrigated.  The area surrounding this was cauterized for possible remnants   of mucosa.  The gallbladder was put into a bag,  brought out through the epigastric   port site.  Port was replaced.  The right upper quadrant was irrigated and   evacuated.  A total of 3 liters of irrigation was used.  Upon   completion, there was some inflammation, some bleeding off the omentum.  There   was no cautery applied to this.  A small amount of Surgicel powder was placed   on this through the laparoscopic  due to the marked inflammation.  A   10 mm channel drain was brought into the abdomen through the epigastric port   site, brought out the lateral port site and then trimmed in length.  It was   left subhepatic.  The epigastric port was removed; and an EndoClose device   with an 0 Vicryl suture was used to reapproximate the fascia.  The drain was   secured to skin with a nylon suture.  All ports were removed.  The umbilical   port was reapproximated with the previously placed 0 Vicryl suture.  The port   sites were then reapproximated with a 4-0 Monocryl suture.  Area was cleaned   and dried.  Steri-Strips were applied as well as gauze and Tegaderm.  A drain   dressing was placed around the right upper quadrant LORENA.  The patient tolerated   the procedure well and was extubated in the OR and brought to recovery room.    Plan to admit to the floor with continued IV antibiotics due to the abscess.      ______________________________  MD GISSELLE MASON/TIANA/JAN    DD:  05/25/2024 14:58  DT:  05/25/2024 16:34    Job#:  [id number]

## 2024-05-26 NOTE — PROGRESS NOTES
Bedside report received from day shift nurse. Assumed care at 1845.   Pt A&Ox4  Tolerating clear liquid diet, denies n/v. + bowel sounds, + flatus, LBM PTA  Saturating >90% on RA. Denies SOB  Pt ambulates SBA. SCDs off, pt ambulatory  X4 lap sites , DIP. LORENA to RUQ    Pain is controlled through medication orders. Updated on plan of care. Safety education provided. Bed locked in low. Call light within reach. Rounding in place.

## 2024-05-26 NOTE — PROGRESS NOTES
"  DATE: 5/26/2024    Post Operative Day  1 laparoscopic cholecystectomy.    INTERVAL EVENTS:  Doing well, pain well managed, tolerated clears.  Drain with bilious output, 240 ml.  CT abdomen with IV/Oral contrast completed, reviwed.  HIDA scan pending.  GI consult, plan for ERCP tomorrow.   NPO.    REVIEW OF SYSTEMS:  Review of systems is remarkable for the following abdominal tenderness. The remainder of the comprehensive ROS is negative with the exception of the aforementioned HPI, PMH, and PSH bullets in accordance with CMS guideline.    PHYSICAL EXAMINATION:  Vital Signs: /87   Pulse 79   Temp 36.6 °C (97.9 °F) (Temporal)   Resp 18   Ht 1.727 m (5' 8\")   Wt 109 kg (240 lb)   SpO2 97%   Physical Exam  Constitutional:       General: He is not in acute distress.  Abdominal:      Comments: Abdomen protuberant, soft, expected incisional tenderness. Reports RUQ pain. Subcostal surgical drain with bilious output.    Skin:     General: Skin is warm and dry.   Neurological:      General: No focal deficit present.         LABORATORY VALUES:  Recent Labs     05/25/24  0452 05/26/24  0208   WBC 11.6* 11.9*   RBC 5.33 4.73   HEMOGLOBIN 15.8 14.1   HEMATOCRIT 47.7 42.5   MCV 89.5 89.9   MCH 29.6 29.8   MCHC 33.1 33.2   RDW 42.0 43.3   PLATELETCT 637* 527*   MPV 9.0 8.9*     Recent Labs     05/25/24  0452 05/26/24  0208   SODIUM 139 138   POTASSIUM 3.8 4.7   CHLORIDE 102 105   CO2 21 23   GLUCOSE 100* 118*   BUN 9 6*   CREATININE 0.84 0.86   CALCIUM 9.0 8.5     Recent Labs     05/25/24  0452 05/26/24  0208   ASTSGOT 201* 72*   ALTSGPT 142* 132*   TBILIRUBIN 1.3 0.4   ALKPHOSPHAT 129* 123*   GLOBULIN 3.4 2.7           IMAGING:  CT-ABDOMEN WITH   Final Result         1. Post cholecystectomy. No abscess.   2. Splenomegaly.   3. Hepatic steatosis.   4. Small bilateral nonobstructing renal stones.               US-RUQ   Final Result         1. Positive for severe gallbladder wall thickening, pericholecystic fluid, " gallbladder distention, and cholelithiasis. Therefore, consistent with acute cholecystitis.   2. Hepatic steatosis.      CT-ABDOMEN-PELVIS WITH   Final Result         1. Gallbladder wall thickening with adjacent inflammation and mild pericholecystic fluid. Therefore, suggesting acute cholecystitis, recommend follow-up right upper quadrant ultrasound.   2. Otherwise, negative.      NM-HEPATOBILIARY SCAN    (Results Pending)       ASSESSMENT AND PLAN:  * Acute gangrenous cholecystitis- (present on admission)  Assessment & Plan  5/25 Laparoscopic cholecystectomy  -Clear liquid diet  -IV antibiotics.  5/26 drain output bilious, 230 ml out  -CT Abd with IV/Oral contrast completed, reviewed.  -HIDA scan pending.  -GI consult, plan for ERCP tomorrow.   NPO         ____________________________________     ARMANI Win    DD: 5/26/2024  9:38 AM

## 2024-05-26 NOTE — CARE PLAN
The patient is Watcher - Medium risk of patient condition declining or worsening    Shift Goals  Clinical Goals: pain control, CT ab with oral/IV contrast, IVF  Patient Goals: feel better  Family Goals: ARPITA    Progress made toward(s) clinical / shift goals:      Problem: Knowledge Deficit - Standard  Goal: Patient and family/care givers will demonstrate understanding of plan of care, disease process/condition, diagnostic tests and medications  Outcome: Progressing  Note: HIDA scan completed with bile leak per MD note. Plan NPO 0000 for ERCP 1300 5/27. Hold lovenox 5/27.     Problem: Pain - Standard  Goal: Alleviation of pain or a reduction in pain to the patient’s comfort goal  Outcome: Progressing  Note: Pain relieved with PRN tylenol and oxy.

## 2024-05-27 ENCOUNTER — ANESTHESIA (OUTPATIENT)
Dept: SURGERY | Facility: MEDICAL CENTER | Age: 29
End: 2024-05-27
Payer: COMMERCIAL

## 2024-05-27 ENCOUNTER — APPOINTMENT (OUTPATIENT)
Dept: RADIOLOGY | Facility: MEDICAL CENTER | Age: 29
End: 2024-05-27
Attending: INTERNAL MEDICINE
Payer: COMMERCIAL

## 2024-05-27 ENCOUNTER — ANESTHESIA EVENT (OUTPATIENT)
Dept: SURGERY | Facility: MEDICAL CENTER | Age: 29
End: 2024-05-27
Payer: COMMERCIAL

## 2024-05-27 LAB
ALBUMIN SERPL BCP-MCNC: 3.1 G/DL (ref 3.2–4.9)
ALBUMIN/GLOB SERPL: 1.1 G/DL
ALP SERPL-CCNC: 100 U/L (ref 30–99)
ALT SERPL-CCNC: 87 U/L (ref 2–50)
ANION GAP SERPL CALC-SCNC: 12 MMOL/L (ref 7–16)
AST SERPL-CCNC: 39 U/L (ref 12–45)
BASOPHILS # BLD AUTO: 0.6 % (ref 0–1.8)
BASOPHILS # BLD: 0.06 K/UL (ref 0–0.12)
BILIRUB SERPL-MCNC: 0.4 MG/DL (ref 0.1–1.5)
BUN SERPL-MCNC: 6 MG/DL (ref 8–22)
CALCIUM ALBUM COR SERPL-MCNC: 8.9 MG/DL (ref 8.5–10.5)
CALCIUM SERPL-MCNC: 8.2 MG/DL (ref 8.5–10.5)
CHLORIDE SERPL-SCNC: 107 MMOL/L (ref 96–112)
CO2 SERPL-SCNC: 22 MMOL/L (ref 20–33)
CREAT SERPL-MCNC: 0.81 MG/DL (ref 0.5–1.4)
EOSINOPHIL # BLD AUTO: 0.02 K/UL (ref 0–0.51)
EOSINOPHIL NFR BLD: 0.2 % (ref 0–6.9)
ERYTHROCYTE [DISTWIDTH] IN BLOOD BY AUTOMATED COUNT: 44.5 FL (ref 35.9–50)
GFR SERPLBLD CREATININE-BSD FMLA CKD-EPI: 122 ML/MIN/1.73 M 2
GLOBULIN SER CALC-MCNC: 2.7 G/DL (ref 1.9–3.5)
GLUCOSE SERPL-MCNC: 90 MG/DL (ref 65–99)
HCT VFR BLD AUTO: 39.4 % (ref 42–52)
HGB BLD-MCNC: 13 G/DL (ref 14–18)
IMM GRANULOCYTES # BLD AUTO: 0.08 K/UL (ref 0–0.11)
IMM GRANULOCYTES NFR BLD AUTO: 0.8 % (ref 0–0.9)
INR PPP: 1.05 (ref 0.87–1.13)
LYMPHOCYTES # BLD AUTO: 2.06 K/UL (ref 1–4.8)
LYMPHOCYTES NFR BLD: 20.9 % (ref 22–41)
MAGNESIUM SERPL-MCNC: 2.1 MG/DL (ref 1.5–2.5)
MCH RBC QN AUTO: 29.9 PG (ref 27–33)
MCHC RBC AUTO-ENTMCNC: 33 G/DL (ref 32.3–36.5)
MCV RBC AUTO: 90.6 FL (ref 81.4–97.8)
MONOCYTES # BLD AUTO: 0.79 K/UL (ref 0–0.85)
MONOCYTES NFR BLD AUTO: 8 % (ref 0–13.4)
NEUTROPHILS # BLD AUTO: 6.86 K/UL (ref 1.82–7.42)
NEUTROPHILS NFR BLD: 69.5 % (ref 44–72)
NRBC # BLD AUTO: 0 K/UL
NRBC BLD-RTO: 0 /100 WBC (ref 0–0.2)
PHOSPHATE SERPL-MCNC: 2.4 MG/DL (ref 2.5–4.5)
PLATELET # BLD AUTO: 457 K/UL (ref 164–446)
PMV BLD AUTO: 8.9 FL (ref 9–12.9)
POTASSIUM SERPL-SCNC: 4.1 MMOL/L (ref 3.6–5.5)
PROT SERPL-MCNC: 5.8 G/DL (ref 6–8.2)
PROTHROMBIN TIME: 13.8 SEC (ref 12–14.6)
RBC # BLD AUTO: 4.35 M/UL (ref 4.7–6.1)
SODIUM SERPL-SCNC: 141 MMOL/L (ref 135–145)
WBC # BLD AUTO: 9.9 K/UL (ref 4.8–10.8)

## 2024-05-27 PROCEDURE — 160009 HCHG ANES TIME/MIN: Performed by: INTERNAL MEDICINE

## 2024-05-27 PROCEDURE — 700105 HCHG RX REV CODE 258: Performed by: SURGERY

## 2024-05-27 PROCEDURE — 80053 COMPREHEN METABOLIC PANEL: CPT

## 2024-05-27 PROCEDURE — 700111 HCHG RX REV CODE 636 W/ 250 OVERRIDE (IP): Performed by: REGISTERED NURSE

## 2024-05-27 PROCEDURE — 85610 PROTHROMBIN TIME: CPT

## 2024-05-27 PROCEDURE — 770001 HCHG ROOM/CARE - MED/SURG/GYN PRIV*

## 2024-05-27 PROCEDURE — 700111 HCHG RX REV CODE 636 W/ 250 OVERRIDE (IP): Mod: JZ | Performed by: SURGERY

## 2024-05-27 PROCEDURE — A9270 NON-COVERED ITEM OR SERVICE: HCPCS | Performed by: SURGERY

## 2024-05-27 PROCEDURE — 85025 COMPLETE CBC W/AUTO DIFF WBC: CPT

## 2024-05-27 PROCEDURE — 43274 ERCP DUCT STENT PLACEMENT: CPT | Performed by: INTERNAL MEDICINE

## 2024-05-27 PROCEDURE — 160002 HCHG RECOVERY MINUTES (STAT): Performed by: INTERNAL MEDICINE

## 2024-05-27 PROCEDURE — 700102 HCHG RX REV CODE 250 W/ 637 OVERRIDE(OP): Performed by: SURGERY

## 2024-05-27 PROCEDURE — 160029 HCHG SURGERY MINUTES - 1ST 30 MINS LEVEL 4: Performed by: INTERNAL MEDICINE

## 2024-05-27 PROCEDURE — 74018 RADEX ABDOMEN 1 VIEW: CPT

## 2024-05-27 PROCEDURE — 160035 HCHG PACU - 1ST 60 MINS PHASE I: Performed by: INTERNAL MEDICINE

## 2024-05-27 PROCEDURE — C2617 STENT, NON-COR, TEM W/O DEL: HCPCS | Performed by: INTERNAL MEDICINE

## 2024-05-27 PROCEDURE — 700111 HCHG RX REV CODE 636 W/ 250 OVERRIDE (IP): Mod: JZ | Performed by: REGISTERED NURSE

## 2024-05-27 PROCEDURE — 160041 HCHG SURGERY MINUTES - EA ADDL 1 MIN LEVEL 4: Performed by: INTERNAL MEDICINE

## 2024-05-27 PROCEDURE — 160048 HCHG OR STATISTICAL LEVEL 1-5: Performed by: INTERNAL MEDICINE

## 2024-05-27 PROCEDURE — 0F798DZ DILATION OF COMMON BILE DUCT WITH INTRALUMINAL DEVICE, VIA NATURAL OR ARTIFICIAL OPENING ENDOSCOPIC: ICD-10-PCS | Performed by: INTERNAL MEDICINE

## 2024-05-27 PROCEDURE — 700117 HCHG RX CONTRAST REV CODE 255: Performed by: INTERNAL MEDICINE

## 2024-05-27 PROCEDURE — 700101 HCHG RX REV CODE 250: Performed by: SURGERY

## 2024-05-27 PROCEDURE — 36415 COLL VENOUS BLD VENIPUNCTURE: CPT

## 2024-05-27 PROCEDURE — 700101 HCHG RX REV CODE 250: Performed by: REGISTERED NURSE

## 2024-05-27 PROCEDURE — 83735 ASSAY OF MAGNESIUM: CPT

## 2024-05-27 PROCEDURE — C1769 GUIDE WIRE: HCPCS | Performed by: INTERNAL MEDICINE

## 2024-05-27 PROCEDURE — 84100 ASSAY OF PHOSPHORUS: CPT

## 2024-05-27 PROCEDURE — 99024 POSTOP FOLLOW-UP VISIT: CPT | Performed by: REGISTERED NURSE

## 2024-05-27 DEVICE — STENT BILIARY ADVANTIX 10FR X 7CM: Type: IMPLANTABLE DEVICE | Site: BILE DUCT | Status: FUNCTIONAL

## 2024-05-27 RX ORDER — DIPHENHYDRAMINE HYDROCHLORIDE 50 MG/ML
12.5 INJECTION INTRAMUSCULAR; INTRAVENOUS
Status: DISCONTINUED | OUTPATIENT
Start: 2024-05-27 | End: 2024-05-27 | Stop reason: HOSPADM

## 2024-05-27 RX ORDER — OXYCODONE HCL 5 MG/5 ML
10 SOLUTION, ORAL ORAL
Status: DISCONTINUED | OUTPATIENT
Start: 2024-05-27 | End: 2024-05-27 | Stop reason: HOSPADM

## 2024-05-27 RX ORDER — LIDOCAINE HYDROCHLORIDE 20 MG/ML
INJECTION, SOLUTION EPIDURAL; INFILTRATION; INTRACAUDAL; PERINEURAL PRN
Status: DISCONTINUED | OUTPATIENT
Start: 2024-05-27 | End: 2024-05-27 | Stop reason: SURG

## 2024-05-27 RX ORDER — SODIUM CHLORIDE, SODIUM LACTATE, POTASSIUM CHLORIDE, CALCIUM CHLORIDE 600; 310; 30; 20 MG/100ML; MG/100ML; MG/100ML; MG/100ML
INJECTION, SOLUTION INTRAVENOUS CONTINUOUS
Status: DISCONTINUED | OUTPATIENT
Start: 2024-05-27 | End: 2024-05-27 | Stop reason: HOSPADM

## 2024-05-27 RX ORDER — CEFAZOLIN SODIUM 1 G/3ML
INJECTION, POWDER, FOR SOLUTION INTRAMUSCULAR; INTRAVENOUS PRN
Status: DISCONTINUED | OUTPATIENT
Start: 2024-05-27 | End: 2024-05-27 | Stop reason: SURG

## 2024-05-27 RX ORDER — ONDANSETRON 2 MG/ML
4 INJECTION INTRAMUSCULAR; INTRAVENOUS
Status: DISCONTINUED | OUTPATIENT
Start: 2024-05-27 | End: 2024-05-27 | Stop reason: HOSPADM

## 2024-05-27 RX ORDER — ONDANSETRON 2 MG/ML
INJECTION INTRAMUSCULAR; INTRAVENOUS PRN
Status: DISCONTINUED | OUTPATIENT
Start: 2024-05-27 | End: 2024-05-27 | Stop reason: SURG

## 2024-05-27 RX ORDER — OXYCODONE HCL 5 MG/5 ML
5 SOLUTION, ORAL ORAL
Status: DISCONTINUED | OUTPATIENT
Start: 2024-05-27 | End: 2024-05-27 | Stop reason: HOSPADM

## 2024-05-27 RX ORDER — DEXAMETHASONE SODIUM PHOSPHATE 4 MG/ML
INJECTION, SOLUTION INTRA-ARTICULAR; INTRALESIONAL; INTRAMUSCULAR; INTRAVENOUS; SOFT TISSUE PRN
Status: DISCONTINUED | OUTPATIENT
Start: 2024-05-27 | End: 2024-05-27 | Stop reason: SURG

## 2024-05-27 RX ORDER — MIDAZOLAM HYDROCHLORIDE 1 MG/ML
INJECTION INTRAMUSCULAR; INTRAVENOUS PRN
Status: DISCONTINUED | OUTPATIENT
Start: 2024-05-27 | End: 2024-05-27 | Stop reason: SURG

## 2024-05-27 RX ORDER — HALOPERIDOL 5 MG/ML
1 INJECTION INTRAMUSCULAR
Status: DISCONTINUED | OUTPATIENT
Start: 2024-05-27 | End: 2024-05-27 | Stop reason: HOSPADM

## 2024-05-27 RX ADMIN — METRONIDAZOLE 500 MG: 500 INJECTION, SOLUTION INTRAVENOUS at 05:31

## 2024-05-27 RX ADMIN — OXYCODONE HYDROCHLORIDE 5 MG: 5 TABLET ORAL at 09:54

## 2024-05-27 RX ADMIN — CEFAZOLIN 2 G: 1 INJECTION, POWDER, FOR SOLUTION INTRAMUSCULAR; INTRAVENOUS at 14:22

## 2024-05-27 RX ADMIN — DEXAMETHASONE SODIUM PHOSPHATE 4 MG: 4 INJECTION INTRA-ARTICULAR; INTRALESIONAL; INTRAMUSCULAR; INTRAVENOUS; SOFT TISSUE at 14:47

## 2024-05-27 RX ADMIN — ACETAMINOPHEN 1000 MG: 500 TABLET, FILM COATED ORAL at 18:27

## 2024-05-27 RX ADMIN — ACETAMINOPHEN 1000 MG: 500 TABLET, FILM COATED ORAL at 05:24

## 2024-05-27 RX ADMIN — FAMOTIDINE 20 MG: 20 TABLET, FILM COATED ORAL at 05:24

## 2024-05-27 RX ADMIN — METRONIDAZOLE 500 MG: 500 INJECTION, SOLUTION INTRAVENOUS at 22:04

## 2024-05-27 RX ADMIN — ONDANSETRON 4 MG: 2 INJECTION INTRAMUSCULAR; INTRAVENOUS at 16:02

## 2024-05-27 RX ADMIN — DOCUSATE SODIUM 100 MG: 100 CAPSULE, LIQUID FILLED ORAL at 18:27

## 2024-05-27 RX ADMIN — FAMOTIDINE 20 MG: 20 TABLET, FILM COATED ORAL at 18:27

## 2024-05-27 RX ADMIN — METRONIDAZOLE 500 MG: 500 INJECTION, SOLUTION INTRAVENOUS at 16:09

## 2024-05-27 RX ADMIN — ACETAMINOPHEN 1000 MG: 500 TABLET, FILM COATED ORAL at 23:33

## 2024-05-27 RX ADMIN — MIDAZOLAM HYDROCHLORIDE 2 MG: 1 INJECTION, SOLUTION INTRAMUSCULAR; INTRAVENOUS at 14:18

## 2024-05-27 RX ADMIN — POTASSIUM PHOSPHATE, MONOBASIC AND POTASSIUM PHOSPHATE, DIBASIC 30 MMOL: 224; 236 INJECTION, SOLUTION, CONCENTRATE INTRAVENOUS at 18:29

## 2024-05-27 RX ADMIN — ACETAMINOPHEN 1000 MG: 500 TABLET, FILM COATED ORAL at 12:23

## 2024-05-27 RX ADMIN — CEFTRIAXONE SODIUM 2000 MG: 10 INJECTION, POWDER, FOR SOLUTION INTRAVENOUS at 05:24

## 2024-05-27 RX ADMIN — LIDOCAINE HYDROCHLORIDE 100 MG: 20 INJECTION, SOLUTION EPIDURAL; INFILTRATION; INTRACAUDAL at 14:18

## 2024-05-27 RX ADMIN — ONDANSETRON 4 MG: 2 INJECTION INTRAMUSCULAR; INTRAVENOUS at 14:47

## 2024-05-27 RX ADMIN — SODIUM CHLORIDE, POTASSIUM CHLORIDE, SODIUM LACTATE AND CALCIUM CHLORIDE: 600; 310; 30; 20 INJECTION, SOLUTION INTRAVENOUS at 06:54

## 2024-05-27 RX ADMIN — ONDANSETRON 4 MG: 2 INJECTION INTRAMUSCULAR; INTRAVENOUS at 09:53

## 2024-05-27 RX ADMIN — PROPOFOL 200 MG: 10 INJECTION, EMULSION INTRAVENOUS at 14:18

## 2024-05-27 RX ADMIN — Medication 100 MG: at 14:18

## 2024-05-27 ASSESSMENT — PAIN DESCRIPTION - PAIN TYPE
TYPE: ACUTE PAIN

## 2024-05-27 NOTE — CARE PLAN
The patient is Stable - Low risk of patient condition declining or worsening    Shift Goals  Clinical Goals: prep for procedure today, pain and nausea control, ambulate, drain care  Patient Goals: rest, pain management, drain management  Family Goals: n/a    Progress made toward(s) clinical / shift goals:  pt had procedure today, states pain is better at this time. Pt up self and ambulating to restroom.  LORENA with small amount of yellow/SS drainage    Patient is not progressing towards the following goals:

## 2024-05-27 NOTE — ANESTHESIA TIME REPORT
Anesthesia Start and Stop Event Times       Date Time Event    5/27/2024 1402 Ready for Procedure     1420 Anesthesia Start     1448 Anesthesia Stop          Responsible Staff  05/27/24      Name Role Begin End    Stu Wu M.D. Anesth 1420 1440          Overtime Reason:  no overtime (within assigned shift)    Comments:

## 2024-05-27 NOTE — PROGRESS NOTES
GI ATTENDING:    Patient seen and examined.  Chart reviewed.  Therapeutic Endoscopic Retrograde Cholangiopancreatography (ERCP) explained at length.    Patient expresses understanding and understands need for repeat ERCP and stent revision in 3 months.    Consenting person was given an opportunity to ask questions and discuss other options.  Risks including but not limited to pancreatitis, contrast reaction, stent migration and/or occlusion, perforation, infection, bleeding, missed lesion(s), cardiac and/or pulmonary event, aspiration, stroke, possible need for surgery and/or interventional radiology, hospitalization possibly prolonged, discomfort, unsuccessful and/or incomplete procedure, indefinite diagnosis, ineffective therapy and/or persistent symptoms, possible need for repeat procedures and/or additional testings, damage to adjacent organs and/or vascular structures, medication reaction, disability, death, and other adverse events possibly life-threatening.  Discussion was undertaken with Layman's terms.  Consenting person stated understanding and acceptance of these risks, and wished to proceed.  Informed consent was given in clear state of mind.

## 2024-05-27 NOTE — OR SURGEON
ERCP operative note    PreOp Diagnosis: Postoperative bile leak      PostOp Diagnosis: Same      Procedure(s):  ERCP (ENDOSCOPIC RETROGRADE CHOLANGIOPANCREATOGRAPHY) - Wound Class: Clean Contaminated  SPHINCTEROTOMY - Wound Class: Clean Contaminated  INSERTION, STENT, BILE DUCT - Wound Class: Clean Contaminated    Surgeon(s):  Robbie Lacy M.D.    Anesthesiologist/Type of Anesthesia:  Anesthesiologist: Stu Wu M.D./General    Surgical Staff:  Endoscopy Technician: Divya Frost  Radiology Technologist: Yvonne Banks  Endoscopy Nurse: Maximo Hamm R.N.    Specimens removed if any:  * No specimens in log *    Dr. Lacy  [facility]  YOLANDA Yanez  [phone]    ERCP with: Biliary sphincterotomy    Bile duct stent placement    Radiologic interpretation of static and dynamic fluoroscopic images    Indication: Postoperative bile leak    Sedation: GA    Findings:    Ampulla     Unremarkable appearance    Pancreatic duct     Wire cannulated once but not catheter cannulated or injected    CBD     Normal course and caliber     Brisk leak at cystic duct     Therapeutics    10 mm biliary sphincterotomy with both papillotome over covered wire    Placement of 10 [place] 7 cm plastic biliary stent with proximal tip proximal to the cystic duct takeoff    Plan:  Follow liver enzymes    Follow drain output    Antibiotics per primary team    Repeat ERCP with stent revision in 3 months as outpatient    Procedure detail:    Prior to procedure, informed consent was obtained.  Risks, benefits, alternatives, including but not limited to risk of bleeding, infection, perforation, adverse reaction to sedating medicine, failure to identify pathology, pancreatitis and death were explained to the patient who accepted all risks.    Patient was prepped in the prone position after intubation and sedation was provided by anesthesia.    Scope tip of the Olympus flexible side-viewing TJF duodena scope was passed to the  level of the biliary ampulla in the short position after the gastric pool was suctioned dry.  Of note the stomach was J-shaped and scope passage was slightly challenging requiring abdominal pressure.  The biliary ampulla was unremarkable.  An Olympus clever [place] papillotome with a 0.025 revolution wire was utilized for attempted cannulation of the bile duct.  Initially the wire passed freely but cross the spine consistent with being in the pancreatic duct.  This was removed promptly.  The tip of the tome and scope were repositioned and on the second attempt the wire passed along the expected course of the bile duct up to the level of the surgical staples.  The tome was followed approximately 1 cm into the distal bile duct and the wire was repositioned multiple times until it was in the common hepatic duct.  Aspiration was positive for bile and injection of contrast demonstrated a normal course and caliber of the bile duct and normal intrahepatic biliary tree.  The cystic duct had a takeoff estimated at the proximal two thirds and distal one third of the bile duct and there was a brisk leak of contrast.  With the wire left in place and the intrahepatic biliary tree a 10 mm biliary sphincterotomy was performed with papillotome over the covered wire.  The wire was left in place and the tome removed and a 10 [place] 7 cm plastic biliary stent was selected passed over the wire and deployed across the ampulla in the usual fashion ensuring that the proximal end was in the common hepatic duct.  The cystic duct takeoff was crossed with the stent.  Excellent bile flow was observed.  Once this was complete, the procedure was deemed complete.  Air and liquid resection.  The scope was withdrawn.  The patient tolerated the procedure well and was sent to recovery without immediate complications.        5/27/2024 2:53 PM Robbie Lacy M.D.

## 2024-05-27 NOTE — PROGRESS NOTES
1445: Pt arrived from OR post ERCp under anesthesia. Pt is arousable to voice. Old dressings to abdomen are CDI with LORENA drain. Cardiac rhythm appears to be SR.     1510: Pt tolerating water.     1536: Report to CELI Pearl.     1547: Pt back to room via bed with transport.

## 2024-05-27 NOTE — CARE PLAN
The patient is Stable - Low risk of patient condition declining or worsening    Shift Goals  Clinical Goals: pain control, monitor drain output, NPO  Patient Goals: rest, pain management, drain management  Family Goals: n/a    Progress made toward(s) clinical / shift goals:  Pt medicated per MAR, resting. Pt is NPO. Drain output remains brown/yellow in color. IVF in place. Updated pt on POC. Care ongoing.    Patient is not progressing towards the following goals:

## 2024-05-27 NOTE — ANESTHESIA PREPROCEDURE EVALUATION
Case: [id number] Date/Time: 05/27/24 1300    Procedures:       ERCP (ENDOSCOPIC RETROGRADE CHOLANGIOPANCREATOGRAPHY) (Esophagus)      SPHINCTEROTOMY (Esophagus)      INSERTION, STENT, BILE DUCT (Esophagus)    Anesthesia type: General    Pre-op diagnosis: Bile Leak    Location: [facility] OR 06 / SURGERY [facility]    Surgeons: Robbie Lacy M.D.            Relevant Problems   No relevant active problems       Physical Exam    Airway   Mallampati: II  TM distance: >3 FB  Neck ROM: full       Cardiovascular - normal exam  Rhythm: regular  Rate: normal  (-) murmur     Dental - normal exam           Pulmonary - normal exam  Breath sounds clear to auscultation     Abdominal    Neurological - normal exam                   Anesthesia Plan    ASA 2       Plan - general               Induction: intravenous    Postoperative Plan: Postoperative administration of opioids is intended.    Pertinent diagnostic labs and testing reviewed    Informed Consent:    Anesthetic plan and risks discussed with patient.    Use of blood products discussed with: patient whom consented to blood products.

## 2024-05-27 NOTE — ANESTHESIA PROCEDURE NOTES
Airway    Date/Time: 5/27/2024 2:21 PM    Performed by: tSu Wu M.D.  Authorized by: Stu Wu M.D.    Location:  OR  Urgency:  Elective  Indications for Airway Management:  Anesthesia      Spontaneous Ventilation: absent    Sedation Level:  Deep  Preoxygenated: Yes    Patient Position:  Sniffing  Final Airway Type:  Endotracheal airway  Final Endotracheal Airway:  ETT  Cuffed: Yes    Technique Used for Successful ETT Placement:  Direct laryngoscopy    Insertion Site:  Oral  Blade Type:  Ibeth  Laryngoscope Blade/Videolaryngoscope Blade Size:  3  ETT Size (mm):  8.0  Measured from:  Teeth  ETT to Teeth (cm):  21  Placement Verified by: auscultation and capnometry    Cormack-Lehane Classification:  Grade I - full view of glottis  Number of Attempts at Approach:  1

## 2024-05-27 NOTE — PROGRESS NOTES
"  DATE: 5/27/2024    Post Operative Day  2 laparoscopic cholecystectomy.    INTERVAL EVENTS:  Doing ok. Reports pain when drain is full.  Pain otherwise adequately managed.  NPO for ERCP with GI today.     REVIEW OF SYSTEMS:  Review of systems is remarkable for the following abdominal tenderness. The remainder of the comprehensive ROS is negative with the exception of the aforementioned HPI, PMH, and PSH bullets in accordance with CMS guideline.    PHYSICAL EXAMINATION:  Vital Signs: BP (!) 144/87   Pulse 92   Temp 36.7 °C (98.1 °F) (Temporal)   Resp 18   Ht 1.727 m (5' 8\")   Wt 109 kg (240 lb)   SpO2 97%   Physical Exam  Constitutional:       General: He is not in acute distress.  Abdominal:      Comments: Abdomen protuberant, soft, expected incisional tenderness. Reports RUQ pain. Subcostal surgical drain with bilious output.    Skin:     General: Skin is warm and dry.   Neurological:      General: No focal deficit present.         LABORATORY VALUES:  Recent Labs     05/25/24 0452 05/26/24 0208 05/27/24 0059   WBC 11.6* 11.9* 9.9   RBC 5.33 4.73 4.35*   HEMOGLOBIN 15.8 14.1 13.0*   HEMATOCRIT 47.7 42.5 39.4*   MCV 89.5 89.9 90.6   MCH 29.6 29.8 29.9   MCHC 33.1 33.2 33.0   RDW 42.0 43.3 44.5   PLATELETCT 637* 527* 457*   MPV 9.0 8.9* 8.9*     Recent Labs     05/25/24 0452 05/26/24 0208 05/27/24 0059   SODIUM 139 138 141   POTASSIUM 3.8 4.7 4.1   CHLORIDE 102 105 107   CO2 21 23 22   GLUCOSE 100* 118* 90   BUN 9 6* 6*   CREATININE 0.84 0.86 0.81   CALCIUM 9.0 8.5 8.2*     Recent Labs     05/25/24 0452 05/26/24 0208 05/27/24 0059   ASTSGOT 201* 72* 39   ALTSGPT 142* 132* 87*   TBILIRUBIN 1.3 0.4 0.4   ALKPHOSPHAT 129* 123* 100*   GLOBULIN 3.4 2.7 2.7   INR  --   --  1.05     Recent Labs     05/27/24  0059   INR 1.05       IMAGING:  NM-HEPATOBILIARY SCAN   Final Result      1.  Brisk bile leak originating in the gallbladder fossa, tracking into the RIGHT lateral abdomen and into surgical drain.   2.  " Common bile duct is patent.      CT-ABDOMEN WITH   Final Result         1. Post cholecystectomy. No abscess.   2. Splenomegaly.   3. Hepatic steatosis.   4. Small bilateral nonobstructing renal stones.               US-RUQ   Final Result         1. Positive for severe gallbladder wall thickening, pericholecystic fluid, gallbladder distention, and cholelithiasis. Therefore, consistent with acute cholecystitis.   2. Hepatic steatosis.      CT-ABDOMEN-PELVIS WITH   Final Result         1. Gallbladder wall thickening with adjacent inflammation and mild pericholecystic fluid. Therefore, suggesting acute cholecystitis, recommend follow-up right upper quadrant ultrasound.   2. Otherwise, negative.      DX-PORTABLE FLUOROSCOPY < 1 HOUR    (Results Pending)   [id number] VIEW    (Results Pending)       ASSESSMENT AND PLAN:  * Acute gangrenous cholecystitis- (present on admission)  Assessment & Plan  5/25 Laparoscopic cholecystectomy with liver abscess drainage  Clear liquid diet  IV antibiotics.  5/27 drain output bilious - 560ml      Postoperative bile leak  Assessment & Plan  5/26 drain output bilious - 230 ml  HIDA scan shows: Brisk bile leak originating in the gallbladder fossa, tracking into the RIGHT lateral abdomen and into surgical drain.   GI consult with plan for ERCP tomorrow.   NPO after MN  5/27 ERCP today.         ____________________________________     ANA PAULA Win.    DD: 5/26/2024  9:38 AM

## 2024-05-27 NOTE — ANESTHESIA POSTPROCEDURE EVALUATION
Patient: Jayden Nielsen    Procedure Summary       Date: 05/27/24 Room / Location: [facility] 06 / SURGERY [facility]    Anesthesia Start: 1420 Anesthesia Stop: 1448    Procedures:       ERCP (ENDOSCOPIC RETROGRADE CHOLANGIOPANCREATOGRAPHY) (Esophagus)      SPHINCTEROTOMY (Esophagus)      INSERTION, STENT, BILE DUCT (Esophagus) Diagnosis: (Bile leak)    Surgeons: Robbie Lacy M.D. Responsible Provider: Stu Wu M.D.    Anesthesia Type: general ASA Status: 2            Final Anesthesia Type: general  Last vitals  BP   Blood Pressure: 133/78    Temp   36.7 °C (98.1 °F)    Pulse   96   Resp   18    SpO2   95 %      Anesthesia Post Evaluation    Patient location during evaluation: PACU  Patient participation: complete - patient participated  Level of consciousness: awake and alert    Airway patency: patent  Anesthetic complications: no  Cardiovascular status: hemodynamically stable  Respiratory status: acceptable  Hydration status: euvolemic    PONV: none          No notable events documented.     Nurse Pain Score: 5 (NPRS)

## 2024-05-27 NOTE — PROGRESS NOTES
Bedside report received from day shift nurse. Assumed care at 1845.   Pt A&Ox4  Tolerating NPO Ice chips diet, denies n/v. + bowel sounds, + flatus, LBM PTA.  Saturating >90% on RA. Denies SOB  Pt ambulates ups elf. SCDs off, pt ambulatory  X4 lap sites and LORENA to RUQ with DIP    Pain is controlled through medication orders. Updated on plan of care. Safety education provided. Bed locked in low. Call light within reach. Rounding in place.

## 2024-05-28 LAB
ALBUMIN SERPL BCP-MCNC: 3.3 G/DL (ref 3.2–4.9)
ALBUMIN/GLOB SERPL: 1.1 G/DL
ALP SERPL-CCNC: 100 U/L (ref 30–99)
ALT SERPL-CCNC: 61 U/L (ref 2–50)
ANION GAP SERPL CALC-SCNC: 14 MMOL/L (ref 7–16)
AST SERPL-CCNC: 23 U/L (ref 12–45)
BACTERIA FLD AEROBE CULT: NORMAL
BASOPHILS # BLD AUTO: 0.2 % (ref 0–1.8)
BASOPHILS # BLD: 0.02 K/UL (ref 0–0.12)
BILIRUB SERPL-MCNC: 0.5 MG/DL (ref 0.1–1.5)
BUN SERPL-MCNC: 5 MG/DL (ref 8–22)
CALCIUM ALBUM COR SERPL-MCNC: 9.1 MG/DL (ref 8.5–10.5)
CALCIUM SERPL-MCNC: 8.5 MG/DL (ref 8.5–10.5)
CHLORIDE SERPL-SCNC: 103 MMOL/L (ref 96–112)
CO2 SERPL-SCNC: 21 MMOL/L (ref 20–33)
CREAT SERPL-MCNC: 0.71 MG/DL (ref 0.5–1.4)
EOSINOPHIL # BLD AUTO: 0.05 K/UL (ref 0–0.51)
EOSINOPHIL NFR BLD: 0.6 % (ref 0–6.9)
ERYTHROCYTE [DISTWIDTH] IN BLOOD BY AUTOMATED COUNT: 43.9 FL (ref 35.9–50)
GFR SERPLBLD CREATININE-BSD FMLA CKD-EPI: 127 ML/MIN/1.73 M 2
GLOBULIN SER CALC-MCNC: 2.9 G/DL (ref 1.9–3.5)
GLUCOSE SERPL-MCNC: 110 MG/DL (ref 65–99)
GRAM STN SPEC: NORMAL
HCT VFR BLD AUTO: 41.2 % (ref 42–52)
HGB BLD-MCNC: 13.7 G/DL (ref 14–18)
IMM GRANULOCYTES # BLD AUTO: 0.08 K/UL (ref 0–0.11)
IMM GRANULOCYTES NFR BLD AUTO: 0.9 % (ref 0–0.9)
LYMPHOCYTES # BLD AUTO: 1.57 K/UL (ref 1–4.8)
LYMPHOCYTES NFR BLD: 17.5 % (ref 22–41)
MCH RBC QN AUTO: 29.9 PG (ref 27–33)
MCHC RBC AUTO-ENTMCNC: 33.3 G/DL (ref 32.3–36.5)
MCV RBC AUTO: 90 FL (ref 81.4–97.8)
MONOCYTES # BLD AUTO: 0.72 K/UL (ref 0–0.85)
MONOCYTES NFR BLD AUTO: 8 % (ref 0–13.4)
NEUTROPHILS # BLD AUTO: 6.52 K/UL (ref 1.82–7.42)
NEUTROPHILS NFR BLD: 72.8 % (ref 44–72)
NRBC # BLD AUTO: 0 K/UL
NRBC BLD-RTO: 0 /100 WBC (ref 0–0.2)
PATHOLOGY CONSULT NOTE: NORMAL
PHOSPHATE SERPL-MCNC: 4.2 MG/DL (ref 2.5–4.5)
PLATELET # BLD AUTO: 458 K/UL (ref 164–446)
PMV BLD AUTO: 9 FL (ref 9–12.9)
POTASSIUM SERPL-SCNC: 4.1 MMOL/L (ref 3.6–5.5)
PROT SERPL-MCNC: 6.2 G/DL (ref 6–8.2)
RBC # BLD AUTO: 4.58 M/UL (ref 4.7–6.1)
SIGNIFICANT IND 70042: NORMAL
SITE SITE: NORMAL
SODIUM SERPL-SCNC: 138 MMOL/L (ref 135–145)
SOURCE SOURCE: NORMAL
WBC # BLD AUTO: 9 K/UL (ref 4.8–10.8)

## 2024-05-28 PROCEDURE — 770001 HCHG ROOM/CARE - MED/SURG/GYN PRIV*

## 2024-05-28 PROCEDURE — 700111 HCHG RX REV CODE 636 W/ 250 OVERRIDE (IP): Mod: JZ | Performed by: REGISTERED NURSE

## 2024-05-28 PROCEDURE — 700111 HCHG RX REV CODE 636 W/ 250 OVERRIDE (IP): Mod: JZ | Performed by: SURGERY

## 2024-05-28 PROCEDURE — 700102 HCHG RX REV CODE 250 W/ 637 OVERRIDE(OP): Performed by: SURGERY

## 2024-05-28 PROCEDURE — 700105 HCHG RX REV CODE 258: Performed by: SURGERY

## 2024-05-28 PROCEDURE — 84100 ASSAY OF PHOSPHORUS: CPT

## 2024-05-28 PROCEDURE — A9270 NON-COVERED ITEM OR SERVICE: HCPCS | Performed by: SURGERY

## 2024-05-28 PROCEDURE — 80053 COMPREHEN METABOLIC PANEL: CPT

## 2024-05-28 PROCEDURE — 99232 SBSQ HOSP IP/OBS MODERATE 35: CPT | Performed by: NURSE PRACTITIONER

## 2024-05-28 PROCEDURE — 85025 COMPLETE CBC W/AUTO DIFF WBC: CPT

## 2024-05-28 PROCEDURE — 99024 POSTOP FOLLOW-UP VISIT: CPT | Performed by: REGISTERED NURSE

## 2024-05-28 PROCEDURE — 700101 HCHG RX REV CODE 250: Performed by: REGISTERED NURSE

## 2024-05-28 RX ORDER — METRONIDAZOLE 500 MG/100ML
500 INJECTION, SOLUTION INTRAVENOUS EVERY 12 HOURS
Status: COMPLETED | OUTPATIENT
Start: 2024-05-28 | End: 2024-05-29

## 2024-05-28 RX ADMIN — ACETAMINOPHEN 1000 MG: 500 TABLET, FILM COATED ORAL at 18:40

## 2024-05-28 RX ADMIN — ACETAMINOPHEN 1000 MG: 500 TABLET, FILM COATED ORAL at 23:34

## 2024-05-28 RX ADMIN — FAMOTIDINE 20 MG: 20 TABLET, FILM COATED ORAL at 04:29

## 2024-05-28 RX ADMIN — OXYCODONE HYDROCHLORIDE 5 MG: 5 TABLET ORAL at 10:19

## 2024-05-28 RX ADMIN — ACETAMINOPHEN 1000 MG: 500 TABLET, FILM COATED ORAL at 12:36

## 2024-05-28 RX ADMIN — SODIUM CHLORIDE, POTASSIUM CHLORIDE, SODIUM LACTATE AND CALCIUM CHLORIDE: 600; 310; 30; 20 INJECTION, SOLUTION INTRAVENOUS at 20:31

## 2024-05-28 RX ADMIN — METRONIDAZOLE 500 MG: 500 INJECTION, SOLUTION INTRAVENOUS at 18:48

## 2024-05-28 RX ADMIN — Medication 1 APPLICATOR: at 18:40

## 2024-05-28 RX ADMIN — ENOXAPARIN SODIUM 40 MG: 100 INJECTION SUBCUTANEOUS at 18:40

## 2024-05-28 RX ADMIN — OXYCODONE HYDROCHLORIDE 5 MG: 5 TABLET ORAL at 05:03

## 2024-05-28 RX ADMIN — Medication 1 APPLICATOR: at 05:04

## 2024-05-28 RX ADMIN — DOCUSATE SODIUM 100 MG: 100 CAPSULE, LIQUID FILLED ORAL at 18:40

## 2024-05-28 RX ADMIN — DOCUSATE SODIUM 100 MG: 100 CAPSULE, LIQUID FILLED ORAL at 04:29

## 2024-05-28 RX ADMIN — FAMOTIDINE 20 MG: 20 TABLET, FILM COATED ORAL at 18:40

## 2024-05-28 RX ADMIN — SENNOSIDES AND DOCUSATE SODIUM 1 TABLET: 50; 8.6 TABLET ORAL at 20:25

## 2024-05-28 RX ADMIN — ONDANSETRON 4 MG: 2 INJECTION INTRAMUSCULAR; INTRAVENOUS at 00:36

## 2024-05-28 RX ADMIN — ACETAMINOPHEN 1000 MG: 500 TABLET, FILM COATED ORAL at 04:29

## 2024-05-28 RX ADMIN — ONDANSETRON 4 MG: 2 INJECTION INTRAMUSCULAR; INTRAVENOUS at 10:19

## 2024-05-28 RX ADMIN — METRONIDAZOLE 500 MG: 500 INJECTION, SOLUTION INTRAVENOUS at 05:12

## 2024-05-28 RX ADMIN — CEFTRIAXONE SODIUM 2000 MG: 10 INJECTION, POWDER, FOR SOLUTION INTRAVENOUS at 05:04

## 2024-05-28 ASSESSMENT — ENCOUNTER SYMPTOMS
MYALGIAS: 1
SORE THROAT: 0
COUGH: 0
VOMITING: 0
CHILLS: 0
FLANK PAIN: 0
ABDOMINAL PAIN: 1
FEVER: 0
INSOMNIA: 0
SHORTNESS OF BREATH: 0
NAUSEA: 0
BLOOD IN STOOL: 0
DIARRHEA: 0
NERVOUS/ANXIOUS: 0
FALLS: 0
CONSTIPATION: 0
WEAKNESS: 0
DIZZINESS: 0

## 2024-05-28 ASSESSMENT — COGNITIVE AND FUNCTIONAL STATUS - GENERAL
SUGGESTED CMS G CODE MODIFIER MOBILITY: CH
DAILY ACTIVITIY SCORE: 24
MOBILITY SCORE: 24
SUGGESTED CMS G CODE MODIFIER DAILY ACTIVITY: CH

## 2024-05-28 ASSESSMENT — PAIN DESCRIPTION - PAIN TYPE
TYPE: ACUTE PAIN

## 2024-05-28 ASSESSMENT — FIBROSIS 4 INDEX: FIB4 SCORE: 0.19

## 2024-05-28 NOTE — PROGRESS NOTES
AA&Ox4. Denies CP/SOB.  Reporting 5/10 pain. Medicated per MAR.   Educated patient regarding pharmacologic and non pharmacologic modalities for pain management.  Skin per flowsheet.  Advanced to regular diet. + N/V.  + void. Last BM PTA.  Pt ambulates upself.  All needs met at this time. Call light within reach. Pt calls appropriately. Bed low and locked, non skid socks in place. Hourly rounding in place.

## 2024-05-28 NOTE — PROGRESS NOTES
Virtual Nurse rounding complete.    Round Needs: Other: Pt with N/V at this time. Patient just notified floor staff via call light prior to this call. Virtual RN discussed reaching back out to floor staff if the symptoms persist.

## 2024-05-28 NOTE — PROGRESS NOTES
"  DATE: 5/28/2024    Post Operative Day  3 laparoscopic cholecystectomy.    POD # 1 ERCP with stent    INTERVAL EVENTS:  Doing better today, pain adequately  managed.  Reports some nausea, 1 episode of emesis this morning.  Advance diet as tolerated.   Anticipate DC home with drain when tolerating diet.    REVIEW OF SYSTEMS:  Review of systems is remarkable for the following abdominal tenderness. The remainder of the comprehensive ROS is negative with the exception of the aforementioned HPI, PMH, and PSH bullets in accordance with CMS guideline.    PHYSICAL EXAMINATION:  Vital Signs: /85   Pulse 85   Temp 36.6 °C (97.9 °F) (Temporal)   Resp 17   Ht 1.727 m (5' 8\")   Wt 109 kg (240 lb)   SpO2 95%   Physical Exam  Constitutional:       General: He is not in acute distress.  Abdominal:      Comments: Abdomen protuberant, soft, expected incisional tenderness. Reports RUQ pain. Subcostal surgical drain with bilious output.    Skin:     General: Skin is warm and dry.   Neurological:      General: No focal deficit present.         LABORATORY VALUES:  Recent Labs     05/26/24 0208 05/27/24 0059 05/28/24 0158   WBC 11.9* 9.9 9.0   RBC 4.73 4.35* 4.58*   HEMOGLOBIN 14.1 13.0* 13.7*   HEMATOCRIT 42.5 39.4* 41.2*   MCV 89.9 90.6 90.0   MCH 29.8 29.9 29.9   MCHC 33.2 33.0 33.3   RDW 43.3 44.5 43.9   PLATELETCT 527* 457* 458*   MPV 8.9* 8.9* 9.0     Recent Labs     05/26/24 0208 05/27/24 0059 05/28/24 0158   SODIUM 138 141 138   POTASSIUM 4.7 4.1 4.1   CHLORIDE 105 107 103   CO2 23 22 21   GLUCOSE 118* 90 110*   BUN 6* 6* 5*   CREATININE 0.86 0.81 0.71   CALCIUM 8.5 8.2* 8.5     Recent Labs     05/26/24 0208 05/27/24 0059 05/28/24 0158   ASTSGOT 72* 39 23   ALTSGPT 132* 87* 61*   TBILIRUBIN 0.4 0.4 0.5   ALKPHOSPHAT 123* 100* 100*   GLOBULIN 2.7 2.7 2.9   INR  --  1.05  --      Recent Labs     05/27/24  0059   INR 1.05         ASSESSMENT AND PLAN:  * Acute gangrenous cholecystitis- (present on " admission)  Assessment & Plan  5/25 Laparoscopic cholecystectomy with liver abscess drainage  Clear liquid diet  IV antibiotics.  5/27 drain output bilious - 560ml  5/28 drain output slowing down, 195 ml bilious output.    Postoperative bile leak  Assessment & Plan  5/26 drain output bilious - 230 ml  HIDA scan shows: Brisk bile leak originating in the gallbladder fossa, tracking into the RIGHT lateral abdomen and into surgical drain.   GI consult with plan for ERCP tomorrow.   NPO after MN  5/27 ERCP with sphincterotomy and biliary stent placment  -No NSAIDs x 5 days.  -Follow up in 3 months with GI         ____________________________________     Myra Clarke A.P.R.NDanilo    DD: 5/26/2024  9:38 AM

## 2024-05-28 NOTE — CARE PLAN
The patient is Stable - Low risk of patient condition declining or worsening    Shift Goals  Clinical Goals: pain/nausea management, monitor drain output  Patient Goals: rest, monitor drain output  Family Goals: n/a    Progress made toward(s) clinical / shift goals:  Pt medicated per MAR, resting. Pain and nausea controlled through medications. Drain with 5mL outpu at this time, continuing to monitor. Updated pt on POC. Care ongoing.    Patient is not progressing towards the following goals:

## 2024-05-28 NOTE — DISCHARGE SUMMARY
DISCHARGE  SUMMARY    DATE OF ADMISSION: 5/25/2024    DATE OF DISCHARGE: 5/29/2024    DISCHARGE DIAGNOSIS:  Principal Problem:    Acute gangrenous cholecystitis  Active Problems:    Postoperative bile leak  Resolved Problems:    * No resolved hospital problems. *      CONSULTATIONS:  Marii Mcclain M.D. Gastroenterology  Robbie Lacy M.D. Gastroenterology    PROCEDURES:  Laparoscopic cholecystectomy    BRIEF HPI and HOSPITAL COURSE:  The patient is a 29-year-old male who presented to the emergency department with a 6-hour history of severe right upper quadrant pain worsening over the last 3 weeks.  Evaluation in the emergency department consistent with acute cholecystitis.  Patient underwent above listed procedure.  Gallbladder was markedly inflamed and the procedure and had multiple areas of necrosis.  Surgical drain was left in place.  Patient noted to have high-volume bilious output on postoperative day 1.  MRCP consistent with brisk bile leak in the gallbladder fossa.  GI was consulted and the patient was taken for ERCP with stent placement.  Patient was monitored status post ERCP.  Drain output did decrease significantly.  On day of discharge patient is tolerating a regular diet, his pain is adequately managed with oral analgesia, and he is having regular bowel movements.  He is instructed that he will need to follow-up with gastroenterology in 3 months for a repeat ERCP for stent revision or removal.  Additionally he will need to follow-up in the [facility] clinic in 2 weeks for postoperative check and drain removal Patient verbalized understanding of discharge and follow-up instructions.    DISPOSITION:   Discharged home in stable with drain on 5/29/24. The patient and family were counseled and questions were answered. Specifically, signs and symptoms of infection, respiratory decompensation,  and persistent or worsening pain were discussed and the patient agrees to seek medical attention if any of  these develop.    DISCHARGE MEDICATIONS:  The patients controlled substance history was reviewed and a controlled substance use informed consent (if applicable) was provided by [facility] and the patient has been prescribed.     Medication List        START taking these medications        Instructions   senna-docusate 8.6-50 MG Tabs  Commonly known as: Pericolace Or Senokot S   Take 1 Tablet by mouth every day. Take as directed on the bottle. Take while on narcotics to avoid constipation.  Dose: 1 Tablet            CHANGE how you take these medications        Instructions   * HYDROcodone-acetaminophen 5-325 MG Tabs per tablet  What changed: Another medication with the same name was added. Make sure you understand how and when to take each.  Commonly known as: Norco   Take 1 Tablet by mouth 3 times a day as needed. Indications: Pain  Dose: 1 Tablet     * HYDROcodone-acetaminophen 5-325 MG Tabs per tablet  What changed: You were already taking a medication with the same name, and this prescription was added. Make sure you understand how and when to take each.  Commonly known as: Norco   Take 1 Tablet by mouth every four hours as needed (severe pain) for up to 1 dose.  Dose: 1 Tablet           * This list has 2 medication(s) that are the same as other medications prescribed for you. Read the directions carefully, and ask your doctor or other care provider to review them with you.                CONTINUE taking these medications        Instructions   albuterol 108 (90 Base) MCG/ACT Aers inhalation aerosol   Inhale 2 Puffs every 6 hours as needed for Shortness of Breath.  Dose: 2 Puff     bismuth subsalicylate 262 MG/15ML Susp  Commonly known as: Pepto-Bismol   Take 15 mL by mouth 2 times a day as needed.  Dose: 15 mL     dicyclomine 10 MG Caps  Commonly known as: Bentyl   Take 1 Capsule by mouth 4 Times a Day,Before Meals and at Bedtime.  Dose: 10 mg     ibuprofen 200 MG Tabs  Commonly known as:  Motrin   Take 400 mg by mouth 1 time a day as needed for Mild Pain.  Dose: 400 mg     omeprazole 40 MG delayed-release capsule  Commonly known as: PriLOSEC   Take 1 Capsule by mouth every day.  Dose: 40 mg     ondansetron 4 MG Tabs tablet  Commonly known as: Zofran   Take 1 Tablet by mouth every four hours as needed for Nausea/Vomiting.  Dose: 4 mg     sucralfate 1 GM/10ML Susp  Commonly known as: Carafate   Take 10 mL by mouth every 6 hours as needed (abdominal pain).  Dose: 1 g            You will be given a prescription for pain medication at discharge. Please take these as directed. It is important to remember not to take medications on an empty stomach as this may cause nausea.  You may also take over the counter acetaminophen per the package instructions.  No NSAIDs x 5 days due to risk of bleed with sphinceterotomy  You may also use ice to the wound to decrease pain and swelling. You may alternate 20 minutes on and 20 minutes off with the ice for the first 24-48 hours. Make sure you place a washcloth or towel between the ice pack and your skin.  Please note that narcotic pain medication cannot be refilled unless you are seen by a doctor. Make sure you call the office if you are running low on medication or if the dose you have been prescribed is not working well for you.    ACTIVITY:  After discharge from the hospital, you may resume full routine activities; however, there should be no heavy lifting (greater than 20 pounds or a bag of groceries) and no strenuous activities for at least 2 weeks. The duration may be longer, depending on your surgical procedure. Routine activities of daily living are acceptable. Activity level should be addressed at your post-op follow up appointment. You may drive whenever you are off pain medications and are able to perform the activities needed to drive, i.e., turning, bending, twisting, etc.      WOUND CARE:  You may shower, but do not submerge in a bath for at least two  weeks. If you have wound dressings, they may come off after 48 hours. If you have skin glue to the wound, this will fall off on its own, do not pick at it. If you have steri strips to the wound, these will fall off on their own, do not pick at them, may trim the edges if needed.  Empty the drain every 8 hours and record output.    DIET:  Upon discharge from the hospital, you may resume your normal preoperative diet, unless specifically directed otherwise. Depending on how you are feeling and whether you have nausea or not, you may wish to stay with a bland diet for the first few days. However, you can advance this as quickly as you feel ready.      FOLLOW UP:  [facility]  [id number]  [place] [address]  [phone]    Follow up on 6/4/2024  Follow up in the ACS clinic in 1 week (6/4) for postoeprative check and drain removal. Call for appointment.    Robbie Lacy M.D.  [id number] [facility]  [place] [id number] [phone]    Follow up in 3 month(s)  Follow up in 3 months for stent revision.    Call the office if you have: (1) Fevers to more than 101F, (2) Unusual chest or leg pain, (3) Drainage or fluid from incision that may be foul smelling, increased tenderness or soreness at the wound or the wound edges are no longer together, redness or swelling at the incision site. Do not hesitate to call with any other questions.    TIME SPENT ON DISCHARGE: 35 minutes      ____________________________________________  MICHAEL MirandaP.    DD: 5/29/2024 9:47 AM

## 2024-05-28 NOTE — PROGRESS NOTES
Bedside report received from day shift nurse. Assumed care at 1845.   Pt A&Ox4  Tolerating clear liquid diet, denies n/v. + bowel sounds, + flatus, LBM PTA.  Saturating >90% on RA. Denies SOB  Pt ambulates up self/SBA. SCDs off, pt educated and ambulatory  X4 lap sites with DIP, LORENA to RUQ    Pain is controlled through medication orders. Updated on plan of care. Safety education provided. Bed locked in low. Call light within reach. Rounding in place.

## 2024-05-28 NOTE — PROGRESS NOTES
Gastroenterology Progress Note               Author:  ARMANI Menendez Date & Time Created: 5/28/2024 3:21 PM       Patient ID:  Name:             Jayden Nilesen  YOB: 1995  Age:                 29 y.o.  male  MRN:               [id number]        Medical Decision Making, by Problem:  Active Hospital Problems    Diagnosis     Postoperative bile leak [K91.89, K83.8]     Acute gangrenous cholecystitis [K81.0]            Presenting Chief Complaint:  Possible bile leak        History of Present Illness:    This is a very pleasant 29 y.o. male who presented to the ER yesterday with severe right upper quadrant pain associated with diaphoresis, malaise and fever.  This was his third presentation to the ER for the symptoms.  He was taken to the OR yesterday afternoon for acute cholecystitis and was found to have gangrenous cholecystitis and a liver abscess.  It was a very difficult operative procedure.  A right upper quadrant subhepatic drain was left in place.  The surgeon mentioned that he saw right upper quadrant bile staining.  He underwent CT abdomen with contrast this morning and there was no fluid or abscess in the gallbladder fossa.  However, his drain is with bilious output of 240 mL.  HIDA scan shows brisk bile leak.            Interval History:  5/28/2024: Patient seen at bedside.  AAOx4.  He reports abdominal pain secondary to bloating.  + bowel movement and flatus.  Bilious output in LORENA drain.  195 mL output yesterday (25ml on night shift).  Tolerating oral intake without difficulty or postprandial pain.  WBC 9, hemoglobin stable/uptrending at 13.7.  AST 23, ALT 61, alk phos 100.  Total bilirubin 0.5.      Hospital Medications:  Current Facility-Administered Medications   Medication Dose Frequency Provider Last Rate Last Admin    Nozin nasal  swab  1 Applicator BID Stephen Resendiz M.D.   1 Applicator at 05/28/24 0504    metroNIDAZOLE (Flagyl) IVPB 500 mg  500 mg Q12HRS Stephen RIVAS  CHIQUITA Resendiz        Respiratory Therapy Consult   Continuous RT Stephen Resendiz M.D.        Pharmacy Consult Request ...Pain Management Review 1 Each  1 Each PHARMACY TO DOSE Stephen Resendiz M.D.        ondansetron (Zofran) syringe/vial injection 4 mg  4 mg Q4HRS PRN Stephen Resendiz M.D.   4 mg at 05/28/24 1019    docusate sodium (Colace) capsule 100 mg  100 mg BID Stephen Resendiz M.D.   100 mg at 05/28/24 0429    senna-docusate (Pericolace Or Senokot S) 8.6-50 MG per tablet 1 Tablet  1 Tablet Nightly Stephen Resendiz M.D.        senna-docusate (Pericolace Or Senokot S) 8.6-50 MG per tablet 1 Tablet  1 Tablet Q24HRS PRN Stephen Resendiz M.D.        polyethylene glycol/lytes (Miralax) Packet 1 Packet  1 Packet BID Stephen Resendiz M.D.        magnesium hydroxide (Milk Of Magnesia) suspension 30 mL  30 mL DAILY AT 1800 Stephen Resendiz M.D.        bisacodyl (Dulcolax) suppository 10 mg  10 mg Q24HRS PRN Stephen Resendiz M.D.        sodium phosphate (Fleet) enema 133 mL  1 Each Once PRN Stephen Resendiz M.D.        LR infusion   Continuous Stephen Resendiz M.D.   Stopped at 05/27/24 1826    enoxaparin (Lovenox) inj 40 mg  40 mg DAILY AT 1800 Myra Clarke, A.P.R.N.   40 mg at 05/26/24 1714    acetaminophen (Tylenol) tablet 1,000 mg  1,000 mg Q6HRS Stephen Resendiz M.D.   1,000 mg at 05/28/24 1236    Followed by    [START ON 5/30/2024] acetaminophen (Tylenol) tablet 1,000 mg  1,000 mg Q6HRS PRN Stephen Resendiz M.D.        oxyCODONE immediate-release (Roxicodone) tablet 5 mg  5 mg Q3HRS PRN Stephen Resendiz M.D.   5 mg at 05/28/24 1019    Or    oxyCODONE immediate release (Roxicodone) tablet 10 mg  10 mg Q3HRS PRN Stephen Resendiz M.D.        Or    HYDROmorphone (Dilaudid) injection 0.5 mg  0.5 mg Q3HRS PRN Stephen Resendiz M.D.   0.5 mg at 05/25/24 0749    famotidine (Pepcid) tablet 20 mg  20 mg BID Stephen Resendiz M.D.   20 mg at 05/28/24 0429    Or    famotidine (Pepcid) injection 20 mg  20 mg BID Stephen Resendiz M.D.        cefTRIAXone (Rocephin) syringe 2,000 mg  2,000 mg  "Q24HRS Myra Clarke A.P.R.N.   2,000 mg at 05/28/24 0504    albuterol inhaler 2 Puff  2 Puff Q4HRS DORYS Resendiz M.D.       Last reviewed on 5/27/2024  2:42 PM by Fernando Boles R.N.       Review of Systems:  Review of Systems   Constitutional:  Negative for chills, fever and malaise/fatigue.   HENT:  Negative for congestion and sore throat.    Respiratory:  Negative for cough and shortness of breath.    Cardiovascular:  Negative for chest pain and leg swelling.   Gastrointestinal:  Positive for abdominal pain. Negative for blood in stool, constipation, diarrhea, melena, nausea and vomiting.   Genitourinary:  Negative for dysuria and flank pain.   Musculoskeletal:  Positive for myalgias. Negative for falls.   Neurological:  Negative for dizziness and weakness.   Psychiatric/Behavioral:  The patient is not nervous/anxious and does not have insomnia.    All other systems reviewed and are negative.        Vital signs:  Weight/BMI: Body mass index is 36.49 kg/m².  /85   Pulse 85   Temp 36.6 °C (97.9 °F) (Temporal)   Resp 17   Ht 1.727 m (5' 8\")   Wt 109 kg (240 lb)   SpO2 95%   Vitals:    05/27/24 2337 05/28/24 0428 05/28/24 0725 05/28/24 1110   BP: 119/80 136/80  135/85   Pulse: 84 86 99 85   Resp: 18 17 18 17   Temp: 36.3 °C (97.3 °F) 36.2 °C (97.2 °F) 36.6 °C (97.9 °F) 36.6 °C (97.9 °F)   TempSrc: Temporal Temporal Temporal Temporal   SpO2: 94% 95% 93% 95%   Weight:       Height:         Oxygen Therapy:  Pulse Oximetry: 95 %, O2 (LPM): 0, O2 Delivery Device: None - Room Air    Intake/Output Summary (Last 24 hours) at 5/28/2024 1521  Last data filed at 5/28/2024 1110  Gross per 24 hour   Intake 2200.03 ml   Output 2505 ml   Net -304.97 ml         Physical Exam:  Physical Exam  Vitals reviewed.   Constitutional:       General: He is awake.      Appearance: Normal appearance. He is well-developed and well-groomed. He is obese. He is not ill-appearing.   HENT:      Head: Normocephalic.      Nose: " Nose normal. No congestion.      Mouth/Throat:      Mouth: Mucous membranes are moist.      Pharynx: Oropharynx is clear. No oropharyngeal exudate.   Eyes:      General: No scleral icterus.     Extraocular Movements: Extraocular movements intact.      Conjunctiva/sclera: Conjunctivae normal.   Cardiovascular:      Rate and Rhythm: Normal rate and regular rhythm.      Pulses: Normal pulses.      Heart sounds: Normal heart sounds. No murmur heard.  Pulmonary:      Effort: Pulmonary effort is normal. No respiratory distress.      Breath sounds: Normal breath sounds.   Abdominal:      General: Abdomen is flat. Bowel sounds are normal. There is no distension.      Palpations: Abdomen is soft.      Tenderness: There is abdominal tenderness. There is no guarding.      Comments: LORENA drain to right upper quadrant with mild-moderate amount of nonbloody bilious output.  Mild surrounding tenderness   Musculoskeletal:      Right lower leg: No edema.      Left lower leg: No edema.   Skin:     General: Skin is warm and dry.      Capillary Refill: Capillary refill takes less than 2 seconds.      Coloration: Skin is not jaundiced.   Neurological:      General: No focal deficit present.      Mental Status: He is alert and oriented to person, place, and time. Mental status is at baseline.      Motor: No weakness.   Psychiatric:         Mood and Affect: Mood normal.         Behavior: Behavior normal. Behavior is cooperative.             Labs:  Recent Labs     05/26/24 0208 05/27/24 0059 05/28/24  0158   SODIUM 138 141 138   POTASSIUM 4.7 4.1 4.1   CHLORIDE 105 107 103   CO2 23 22 21   BUN 6* 6* 5*   CREATININE 0.86 0.81 0.71   MAGNESIUM  --  2.1  --    PHOSPHORUS  --  2.4* 4.2   CALCIUM 8.5 8.2* 8.5     Recent Labs     05/26/24 0208 05/27/24 0059 05/28/24 0158   ALTSGPT 132* 87* 61*   ASTSGOT 72* 39 23   ALKPHOSPHAT 123* 100* 100*   TBILIRUBIN 0.4 0.4 0.5   GLUCOSE 118* 90 110*     Recent Labs     05/26/24 0208 05/27/24 0059  05/28/24  0158   WBC 11.9* 9.9 9.0   NEUTSPOLYS 86.40* 69.50 72.80*   LYMPHOCYTES 7.40* 20.90* 17.50*   MONOCYTES 5.10 8.00 8.00   EOSINOPHILS 0.00 0.20 0.60   BASOPHILS 0.10 0.60 0.20   ASTSGOT 72* 39 23   ALTSGPT 132* 87* 61*   ALKPHOSPHAT 123* 100* 100*   TBILIRUBIN 0.4 0.4 0.5     Recent Labs     05/26/24  0208 05/27/24  0059 05/28/24 0158   RBC 4.73 4.35* 4.58*   HEMOGLOBIN 14.1 13.0* 13.7*   HEMATOCRIT 42.5 39.4* 41.2*   PLATELETCT 527* 457* 458*   PROTHROMBTM  --  13.8  --    INR  --  1.05  --      Recent Results (from the past 24 hour(s))   CBC with Differential: Tomorrow AM    Collection Time: 05/28/24  1:58 AM   Result Value Ref Range    WBC 9.0 4.8 - 10.8 K/uL    RBC 4.58 (L) 4.70 - 6.10 M/uL    Hemoglobin 13.7 (L) 14.0 - 18.0 g/dL    Hematocrit 41.2 (L) 42.0 - 52.0 %    MCV 90.0 81.4 - 97.8 fL    MCH 29.9 27.0 - 33.0 pg    MCHC 33.3 32.3 - 36.5 g/dL    RDW 43.9 35.9 - 50.0 fL    Platelet Count 458 (H) 164 - 446 K/uL    MPV 9.0 9.0 - 12.9 fL    Neutrophils-Polys 72.80 (H) 44.00 - 72.00 %    Lymphocytes 17.50 (L) 22.00 - 41.00 %    Monocytes 8.00 0.00 - 13.40 %    Eosinophils 0.60 0.00 - 6.90 %    Basophils 0.20 0.00 - 1.80 %    Immature Granulocytes 0.90 0.00 - 0.90 %    Nucleated RBC 0.00 0.00 - 0.20 /100 WBC    Neutrophils (Absolute) 6.52 1.82 - 7.42 K/uL    Lymphs (Absolute) 1.57 1.00 - 4.80 K/uL    Monos (Absolute) 0.72 0.00 - 0.85 K/uL    Eos (Absolute) 0.05 0.00 - 0.51 K/uL    Baso (Absolute) 0.02 0.00 - 0.12 K/uL    Immature Granulocytes (abs) 0.08 0.00 - 0.11 K/uL    NRBC (Absolute) 0.00 K/uL   Comp Metabolic Panel (CMP): Tomorrow AM    Collection Time: 05/28/24  1:58 AM   Result Value Ref Range    Sodium 138 135 - 145 mmol/L    Potassium 4.1 3.6 - 5.5 mmol/L    Chloride 103 96 - 112 mmol/L    Co2 21 20 - 33 mmol/L    Anion Gap 14.0 7.0 - 16.0    Glucose 110 (H) 65 - 99 mg/dL    Bun 5 (L) 8 - 22 mg/dL    Creatinine 0.71 0.50 - 1.40 mg/dL    Calcium 8.5 8.5 - 10.5 mg/dL    Correct Calcium 9.1 8.5  - 10.5 mg/dL    AST(SGOT) 23 12 - 45 U/L    ALT(SGPT) 61 (H) 2 - 50 U/L    Alkaline Phosphatase 100 (H) 30 - 99 U/L    Total Bilirubin 0.5 0.1 - 1.5 mg/dL    Albumin 3.3 3.2 - 4.9 g/dL    Total Protein 6.2 6.0 - 8.2 g/dL    Globulin 2.9 1.9 - 3.5 g/dL    A-G Ratio 1.1 g/dL   PHOSPHORUS    Collection Time: 05/28/24  1:58 AM   Result Value Ref Range    Phosphorus 4.2 2.5 - 4.5 mg/dL   ESTIMATED GFR    Collection Time: 05/28/24  1:58 AM   Result Value Ref Range    GFR (CKD-EPI) 127 >60 mL/min/1.73 m 2       Radiology Review:  DX-PORTABLE FLUOROSCOPY < 1 HOUR   Final Result      Portable fluoroscopy utilized for 23 seconds.         INTERPRETING LOCATION: [address] , [place], [id number]      [id number] VIEW   Final Result      Digitized intraoperative radiograph is submitted for review. This examination is not for diagnostic purpose but for guidance during a surgical procedure. Please see the patient's chart for full procedural details.         INTERPRETING LOCATION: [address] , [place], [address]      NM-HEPATOBILIARY SCAN   Final Result      1.  Brisk bile leak originating in the gallbladder fossa, tracking into the RIGHT lateral abdomen and into surgical drain.   2.  Common bile duct is patent.      CT-ABDOMEN WITH   Final Result         1. Post cholecystectomy. No abscess.   2. Splenomegaly.   3. Hepatic steatosis.   4. Small bilateral nonobstructing renal stones.               US-RUQ   Final Result         1. Positive for severe gallbladder wall thickening, pericholecystic fluid, gallbladder distention, and cholelithiasis. Therefore, consistent with acute cholecystitis.   2. Hepatic steatosis.      CT-ABDOMEN-PELVIS WITH   Final Result         1. Gallbladder wall thickening with adjacent inflammation and mild pericholecystic fluid. Therefore, suggesting acute cholecystitis, recommend follow-up right upper quadrant ultrasound.   2. Otherwise, negative.            MDM (Data Review):   -Records reviewed and summarized in  current documentation  -I personally reviewed and interpreted the laboratory results  -I personally reviewed the radiology images    Assessment/Recommendations:  Postoperative bile leak  S/p ERCP with stent placement  Acute gangrenous cholecystitis status postcholecystectomy  Abnormal liver tests, improved  Leukocytosis  Hematuria on UA     RECS:  Monitor I/os  Antibiotics per primary team  Drain management per primary/surgical team  Avoid NSAIDs x 5 days due to risk of bleed with sphincterotomy.  Anticoagulation okay after 24 hours  Repeat ERCP with stent revision in 3 months-follow-up with Dr. Lacy-referral placed  Diet per primary team    No further interventions from acute GI service. GI to sign off. Please reconsult for any further questions or concerns.    Discussed with patient, Myra Perez surgical APRN    Core Quality Measures   Reviewed items::  Labs, Medications and Radiology reports reviewed

## 2024-05-28 NOTE — PROGRESS NOTES
Pt back from procedure  C/o nausea, medicated per MAR  No other needs at this time  Hourly rounding and call light in place

## 2024-05-29 ENCOUNTER — PHARMACY VISIT (OUTPATIENT)
Dept: PHARMACY | Facility: MEDICAL CENTER | Age: 29
End: 2024-05-29
Payer: COMMERCIAL

## 2024-05-29 VITALS
WEIGHT: 240 LBS | TEMPERATURE: 97.9 F | OXYGEN SATURATION: 96 % | RESPIRATION RATE: 20 BRPM | HEIGHT: 68 IN | DIASTOLIC BLOOD PRESSURE: 80 MMHG | HEART RATE: 90 BPM | SYSTOLIC BLOOD PRESSURE: 144 MMHG | BODY MASS INDEX: 36.37 KG/M2

## 2024-05-29 LAB
ALBUMIN SERPL BCP-MCNC: 3.3 G/DL (ref 3.2–4.9)
ALBUMIN/GLOB SERPL: 1.1 G/DL
ALP SERPL-CCNC: 93 U/L (ref 30–99)
ALT SERPL-CCNC: 71 U/L (ref 2–50)
ANION GAP SERPL CALC-SCNC: 10 MMOL/L (ref 7–16)
AST SERPL-CCNC: 43 U/L (ref 12–45)
BASOPHILS # BLD AUTO: 0.6 % (ref 0–1.8)
BASOPHILS # BLD: 0.05 K/UL (ref 0–0.12)
BILIRUB SERPL-MCNC: 0.4 MG/DL (ref 0.1–1.5)
BUN SERPL-MCNC: 4 MG/DL (ref 8–22)
CALCIUM ALBUM COR SERPL-MCNC: 9.2 MG/DL (ref 8.5–10.5)
CALCIUM SERPL-MCNC: 8.6 MG/DL (ref 8.5–10.5)
CHLORIDE SERPL-SCNC: 104 MMOL/L (ref 96–112)
CO2 SERPL-SCNC: 25 MMOL/L (ref 20–33)
CREAT SERPL-MCNC: 0.75 MG/DL (ref 0.5–1.4)
EOSINOPHIL # BLD AUTO: 0.08 K/UL (ref 0–0.51)
EOSINOPHIL NFR BLD: 1 % (ref 0–6.9)
ERYTHROCYTE [DISTWIDTH] IN BLOOD BY AUTOMATED COUNT: 44.8 FL (ref 35.9–50)
GFR SERPLBLD CREATININE-BSD FMLA CKD-EPI: 125 ML/MIN/1.73 M 2
GLOBULIN SER CALC-MCNC: 2.9 G/DL (ref 1.9–3.5)
GLUCOSE SERPL-MCNC: 118 MG/DL (ref 65–99)
HCT VFR BLD AUTO: 43.4 % (ref 42–52)
HGB BLD-MCNC: 14.1 G/DL (ref 14–18)
IMM GRANULOCYTES # BLD AUTO: 0.08 K/UL (ref 0–0.11)
IMM GRANULOCYTES NFR BLD AUTO: 1 % (ref 0–0.9)
LYMPHOCYTES # BLD AUTO: 1.71 K/UL (ref 1–4.8)
LYMPHOCYTES NFR BLD: 20.4 % (ref 22–41)
MCH RBC QN AUTO: 29.4 PG (ref 27–33)
MCHC RBC AUTO-ENTMCNC: 32.5 G/DL (ref 32.3–36.5)
MCV RBC AUTO: 90.6 FL (ref 81.4–97.8)
MONOCYTES # BLD AUTO: 0.76 K/UL (ref 0–0.85)
MONOCYTES NFR BLD AUTO: 9.1 % (ref 0–13.4)
NEUTROPHILS # BLD AUTO: 5.69 K/UL (ref 1.82–7.42)
NEUTROPHILS NFR BLD: 67.9 % (ref 44–72)
NRBC # BLD AUTO: 0 K/UL
NRBC BLD-RTO: 0 /100 WBC (ref 0–0.2)
PLATELET # BLD AUTO: 479 K/UL (ref 164–446)
PMV BLD AUTO: 9 FL (ref 9–12.9)
POTASSIUM SERPL-SCNC: 4 MMOL/L (ref 3.6–5.5)
PROT SERPL-MCNC: 6.2 G/DL (ref 6–8.2)
RBC # BLD AUTO: 4.79 M/UL (ref 4.7–6.1)
SODIUM SERPL-SCNC: 139 MMOL/L (ref 135–145)
WBC # BLD AUTO: 8.4 K/UL (ref 4.8–10.8)

## 2024-05-29 PROCEDURE — A9270 NON-COVERED ITEM OR SERVICE: HCPCS | Performed by: SURGERY

## 2024-05-29 PROCEDURE — 700101 HCHG RX REV CODE 250: Performed by: REGISTERED NURSE

## 2024-05-29 PROCEDURE — 700111 HCHG RX REV CODE 636 W/ 250 OVERRIDE (IP): Mod: JZ | Performed by: SURGERY

## 2024-05-29 PROCEDURE — 85025 COMPLETE CBC W/AUTO DIFF WBC: CPT

## 2024-05-29 PROCEDURE — 700102 HCHG RX REV CODE 250 W/ 637 OVERRIDE(OP): Performed by: SURGERY

## 2024-05-29 PROCEDURE — 99024 POSTOP FOLLOW-UP VISIT: CPT

## 2024-05-29 PROCEDURE — 80053 COMPREHEN METABOLIC PANEL: CPT

## 2024-05-29 PROCEDURE — RXMED WILLOW AMBULATORY MEDICATION CHARGE

## 2024-05-29 PROCEDURE — 700111 HCHG RX REV CODE 636 W/ 250 OVERRIDE (IP): Performed by: REGISTERED NURSE

## 2024-05-29 RX ORDER — HYDROCODONE BITARTRATE AND ACETAMINOPHEN 5; 325 MG/1; MG/1
1 TABLET ORAL EVERY 4 HOURS PRN
Qty: 1 TABLET | Refills: 0 | Status: SHIPPED | OUTPATIENT
Start: 2024-05-29 | End: 2024-05-30

## 2024-05-29 RX ORDER — AMOXICILLIN 250 MG
1 CAPSULE ORAL DAILY
COMMUNITY
Start: 2024-05-29 | End: 2024-05-31 | Stop reason: SDUPTHER

## 2024-05-29 RX ADMIN — METRONIDAZOLE 500 MG: 500 INJECTION, SOLUTION INTRAVENOUS at 05:48

## 2024-05-29 RX ADMIN — CEFTRIAXONE SODIUM 2000 MG: 10 INJECTION, POWDER, FOR SOLUTION INTRAVENOUS at 04:42

## 2024-05-29 RX ADMIN — Medication 1 APPLICATOR: at 04:42

## 2024-05-29 RX ADMIN — ACETAMINOPHEN 1000 MG: 500 TABLET, FILM COATED ORAL at 04:42

## 2024-05-29 RX ADMIN — FAMOTIDINE 20 MG: 10 INJECTION, SOLUTION INTRAVENOUS at 04:42

## 2024-05-29 NOTE — CARE PLAN
The patient is Stable - Low risk of patient condition declining or worsening    Shift Goals  Clinical Goals: tolerate diet; discharge  Patient Goals: discharge  Family Goals: n/a    Progress made toward(s) clinical / shift goals:       Patient is not progressing towards the following goals:

## 2024-05-29 NOTE — PROGRESS NOTES
AA&Ox4. Denies CP/SOB.  No reports of pain.   Educated patient regarding pharmacologic and non pharmacologic modalities for pain management.  Skin per flowsheet.  Tolerating low fat diet. Denies N/V.  + void. Last BM 5/28.  Pt ambulates upself.  All needs met at this time. Call light within reach. Pt calls appropriately. Bed low and locked, non skid socks in place. Hourly rounding in place.

## 2024-05-29 NOTE — PROGRESS NOTES
Discharge orders received.  Patient arrived to the discharge lounge.  PIV removed.  Instructions given, medications reviewed and general discharge education provided to patient.  Follow up appointments discussed.  Patient verbalized understanding of dc instructions and prescriptions.  Patient signed discharge instructions.  Patient verbalized he had all belongings with him.  Meds to beds medications reviewed.  Patient left via car to home.  Wished patient a speedy recovery.

## 2024-05-29 NOTE — CARE PLAN
Problem: Knowledge Deficit - Standard  Goal: Patient and family/care givers will demonstrate understanding of plan of care, disease process/condition, diagnostic tests and medications  Description: Target End Date:  1-3 days or as soon as patient condition allows    Document in Patient Education    1.  Patient and family/caregiver oriented to unit, equipment, visitation policy and means for communicating concern  2.  Complete/review Learning Assessment  3.  Assess knowledge level of disease process/condition, treatment plan, diagnostic tests and medications  4.  Explain disease process/condition, treatment plan, diagnostic tests and medications  Outcome: Progressing   The patient is Stable - Low risk of patient condition declining or worsening    Shift Goals  Clinical Goals: drain care, hydration, rest  Patient Goals: rest  Family Goals: n/a    Progress made toward(s) clinical / shift goals:  plan of care discussed with the pt and verbalized understanding.

## 2024-05-29 NOTE — PROGRESS NOTES
Discharge Lounge order placed and patient educated. Care plan and patient education completed. All belongings returned to patient. Medication delivered to bedside. Patient transported via wheelchair to discharge lounge. Family instructed to pickup patient at [facility].

## 2024-05-30 LAB
BACTERIA SPEC ANAEROBE CULT: NORMAL
FUNGUS SPEC CULT: NORMAL
FUNGUS SPEC FUNGUS STN: NORMAL
SIGNIFICANT IND 70042: NORMAL
SIGNIFICANT IND 70042: NORMAL
SITE SITE: NORMAL
SITE SITE: NORMAL
SOURCE SOURCE: NORMAL
SOURCE SOURCE: NORMAL

## 2024-05-31 DIAGNOSIS — K81.0 ACUTE GANGRENOUS CHOLECYSTITIS: ICD-10-CM

## 2024-05-31 DIAGNOSIS — R10.13 EPIGASTRIC BURNING SENSATION: ICD-10-CM

## 2024-05-31 RX ORDER — DICYCLOMINE HYDROCHLORIDE 10 MG/1
10 CAPSULE ORAL
Qty: 120 CAPSULE | Refills: 0 | Status: SHIPPED | OUTPATIENT
Start: 2024-05-31 | End: 2024-05-31 | Stop reason: SDUPTHER

## 2024-05-31 RX ORDER — DICYCLOMINE HYDROCHLORIDE 10 MG/1
10 CAPSULE ORAL
Qty: 120 CAPSULE | Refills: 0 | Status: SHIPPED | OUTPATIENT
Start: 2024-05-31

## 2024-05-31 RX ORDER — AMOXICILLIN 250 MG
1 CAPSULE ORAL DAILY
Qty: 30 TABLET | Refills: 1 | Status: SHIPPED | OUTPATIENT
Start: 2024-05-31

## 2024-05-31 NOTE — TELEPHONE ENCOUNTER
Patient called stating he was suppose to be prescribed dycyclomene by provider but never received script.    Patient was also discharged from inpatient on 5/29 and was prescribed senna docusate but his pharmacy did not get the script and he was advised to call provider to get it.    Please send both scripts to  Pharmacy:  [facility]    Contact # [phone]

## 2024-05-31 NOTE — TELEPHONE ENCOUNTER
Can you resend his Bentyl.  Looks like it's been 11 days, most likely pharmacy put it back.  And also, is the Senokot OTC?

## 2024-06-04 ENCOUNTER — OFFICE VISIT (OUTPATIENT)
Dept: SURGERY | Facility: MEDICAL CENTER | Age: 29
End: 2024-06-04
Attending: SURGERY
Payer: COMMERCIAL

## 2024-06-04 VITALS — BODY MASS INDEX: 35.44 KG/M2 | HEIGHT: 69 IN

## 2024-06-04 DIAGNOSIS — K83.8 POSTOPERATIVE BILE LEAK: ICD-10-CM

## 2024-06-04 DIAGNOSIS — K91.89 POSTOPERATIVE BILE LEAK: ICD-10-CM

## 2024-06-04 PROCEDURE — 99024 POSTOP FOLLOW-UP VISIT: CPT | Performed by: SURGERY

## 2024-06-04 NOTE — PROGRESS NOTES
Patient returns.  He is improving.  He is about 3 weeks postop.  He had gangrenous cholecystitis.  Postoperatively he had a bile leak fortunately did have a drain in place.  The bile leak was closing during his admission and he went home with the drain.  Drainage was initially bilious but now has become serous.  He is feeling well.  He still somewhat weak.  No fever or chills he is not clinically jaundiced his bowels are brown and his drain is serous.  His drain was removed today he was given further instructions on wound care.  It appears that he has recovered well.  He plans on returning to work on the ninth.  He still fairly fatigued which is typical and will probably persist for another month we will put him on light duty for 4 weeks unless there is clinical deterioration he may return on an as-needed basis

## 2024-06-12 ENCOUNTER — OFFICE VISIT (OUTPATIENT)
Dept: MEDICAL GROUP | Facility: LAB | Age: 29
End: 2024-06-12
Payer: COMMERCIAL

## 2024-06-12 VITALS
BODY MASS INDEX: 34.72 KG/M2 | WEIGHT: 234.4 LBS | HEIGHT: 69 IN | HEART RATE: 76 BPM | SYSTOLIC BLOOD PRESSURE: 106 MMHG | OXYGEN SATURATION: 99 % | TEMPERATURE: 97.1 F | DIASTOLIC BLOOD PRESSURE: 64 MMHG | RESPIRATION RATE: 14 BRPM

## 2024-06-12 DIAGNOSIS — K81.0 ACUTE GANGRENOUS CHOLECYSTITIS: ICD-10-CM

## 2024-06-12 DIAGNOSIS — Z09 HOSPITAL DISCHARGE FOLLOW-UP: Primary | ICD-10-CM

## 2024-06-12 DIAGNOSIS — R10.12 LUQ PAIN: ICD-10-CM

## 2024-06-12 ASSESSMENT — FIBROSIS 4 INDEX: FIB4 SCORE: 0.31

## 2024-06-12 NOTE — PROGRESS NOTES
Subjective:     Jayden Nielsen is a 29 y.o. male who presents for Hospital Follow-up.    Transitional Care Management          HPI:   Recently hospitalized for     Patient presented to ER 05/25/2024 for severe right upper quadrant pain.  Patient was determined to have acute cholecystitis via CT scan.  Patient underwent emergent cholecystectomy which noted the gallbladder was markedly inflamed and had multiple areas of necrosis.  Postoperative recovery was complicated by high-volume bilious output.  Patient underwent subsequent MRCP which showed bile leak in the gallbladder fossa.  Patient then underwent ERCP with stent placement.  Status post stent placement symptoms did improve.    Patient did follow-up with [facility] 06/04/2024 for drain removal.  Surgical incision sites have healed without complication.  He notes the surgical sites are occasionally tender with overexertion/stretching.  Patient is adhering to lifting restrictions.  Patient reports normal bowel movements this time.  Denies changes in urination.  He does report some mild bloating/gassiness which he is treating with simethicone over-the-counter.  Denies any recent episodes of fever, chills, nausea/vomiting/diarrhea    Plan for repeat ERCP with stent removal in Sept 2024  -scheduling pending with gastroenterology    Patient notes he may need updated [facility] paperwork for his employer but will confirm with his supervisor    Current medicines (including reconciliation performed today)  Current Outpatient Medications   Medication Sig Dispense Refill    senna-docusate (PERICOLACE OR SENOKOT S) 8.6-50 MG Tab Take 1 Tablet by mouth every day. Take as directed on the bottle. Take while on narcotics to avoid constipation. 30 Tablet 1    dicyclomine (BENTYL) 10 MG Cap Take 1 Capsule by mouth 4 Times a Day,Before Meals and at Bedtime. 120 Capsule 0    HYDROcodone-acetaminophen (NORCO) 5-325 MG Tab per tablet Take 1 Tablet by mouth 3 times a day as needed.  "Indications: Pain      ibuprofen (MOTRIN) 200 MG Tab Take 400 mg by mouth 1 time a day as needed for Mild Pain.      bismuth subsalicylate (PEPTO-BISMOL) 262 MG/15ML Suspension Take 15 mL by mouth 2 times a day as needed.      omeprazole (PRILOSEC) 40 MG delayed-release capsule Take 1 Capsule by mouth every day. 30 Capsule 3    sucralfate (CARAFATE) 1 GM/10ML Suspension Take 10 mL by mouth every 6 hours as needed (abdominal pain). 420 mL 0    albuterol 108 (90 Base) MCG/ACT Aero Soln inhalation aerosol Inhale 2 Puffs every 6 hours as needed for Shortness of Breath. 8.5 g 0    ondansetron (ZOFRAN) 4 MG Tab tablet Take 1 Tablet by mouth every four hours as needed for Nausea/Vomiting. 15 Tablet 0     No current facility-administered medications for this visit.       Allergies:   Patient has no known allergies.    Social History     Tobacco Use    Smoking status: Never    Smokeless tobacco: Never   Vaping Use    Vaping status: Never Used   Substance Use Topics    Alcohol use: Not Currently     Comment: socially, maybe 2 drinks a year    Drug use: No       ROS:  All ROS negative except for pertinent positives listed above.     Objective:     Vitals:    06/12/24 1420   BP: 106/64   BP Location: Left arm   Patient Position: Sitting   BP Cuff Size: Adult   Pulse: 76   Resp: 14   Temp: 36.2 °C (97.1 °F)   SpO2: 99%   Weight: 106 kg (234 lb 6.4 oz)   Height: 1.753 m (5' 9\")     Body mass index is 34.61 kg/m².    Physical Exam:  General: NAD   HEENT: PERRL; EOMI, nl conjunctiva & lids, hearing grossly nl, EACs and TMs nl, good dentition, OP clear   Neck: supple, no thyromegaly or nodules, no LAD   Lungs: CTA bilaterally, Nl I:E ratio   CV: RRR. No M/R/G, no edema   Abd: NABS, S/NT/ND, no masses.  4 well-healing surgical incisions are noted no surrounding erythema, induration or drainage is noted  MSK: grossly normal   Neuro: Normal cognition, CNs grossly intact, nl strength, nl gait   Psych: normal judgment, insight, mood, " affect   Skin: no rashes, ecchymoses, suspicious lesions      Assessment and Plan:   1. Acute gangrenous cholecystitis  - Chart and discharge summary were reviewed.   - Hospitalization and results reviewed with patient.   - Medications reviewed including instructions regarding high risk medications, dosing and side effects.  - Recommended Services: No services needed at this time  - Advance directive/POLST on file?  No   -Patient will need to follow-up with gastroenterology for repeat ERCP and stent removal in September, scheduling for this is pending.  Patient will also need updated CMP to assess for elevated liver enzymes which appeared to have been resolving upon discharge  - Comp Metabolic Panel; Future    2. LUQ pain  - Comp Metabolic Panel; Future            Follow-up:No follow-ups on file.    Face-to-face transitional care management services with [facility] (today's visit is within 14 days post discharge & LACE+ score of 28-58) medical decision complexity were provided.

## 2024-06-21 ENCOUNTER — PHARMACY VISIT (OUTPATIENT)
Dept: PHARMACY | Facility: MEDICAL CENTER | Age: 29
End: 2024-06-21
Payer: COMMERCIAL

## 2024-06-21 PROCEDURE — RXMED WILLOW AMBULATORY MEDICATION CHARGE: Performed by: EMERGENCY MEDICINE

## 2024-06-25 LAB
FUNGUS SPEC CULT: NORMAL
FUNGUS SPEC FUNGUS STN: NORMAL
SIGNIFICANT IND 70042: NORMAL
SITE SITE: NORMAL
SOURCE SOURCE: NORMAL

## 2024-07-01 ENCOUNTER — PATIENT MESSAGE (OUTPATIENT)
Dept: MEDICAL GROUP | Facility: LAB | Age: 29
End: 2024-07-01
Payer: COMMERCIAL

## 2024-07-07 LAB
MYCOBACTERIUM SPEC CULT: NORMAL
RHODAMINE-AURAMINE STN SPEC: NORMAL
SIGNIFICANT IND 70042: NORMAL
SITE SITE: NORMAL
SOURCE SOURCE: NORMAL

## 2024-08-02 ENCOUNTER — APPOINTMENT (OUTPATIENT)
Dept: ADMISSIONS | Facility: MEDICAL CENTER | Age: 29
End: 2024-08-02
Attending: INTERNAL MEDICINE
Payer: COMMERCIAL

## 2024-08-12 ENCOUNTER — PRE-ADMISSION TESTING (OUTPATIENT)
Dept: ADMISSIONS | Facility: MEDICAL CENTER | Age: 29
End: 2024-08-12
Attending: INTERNAL MEDICINE
Payer: COMMERCIAL

## 2024-08-12 VITALS — HEIGHT: 69 IN | BODY MASS INDEX: 34.87 KG/M2

## 2024-08-12 DIAGNOSIS — Z01.812 PRE-OPERATIVE LABORATORY EXAMINATION: ICD-10-CM

## 2024-08-14 ENCOUNTER — PRE-ADMISSION TESTING (OUTPATIENT)
Dept: ADMISSIONS | Facility: MEDICAL CENTER | Age: 29
End: 2024-08-14
Attending: INTERNAL MEDICINE
Payer: COMMERCIAL

## 2024-08-14 DIAGNOSIS — Z01.812 PRE-OPERATIVE LABORATORY EXAMINATION: ICD-10-CM

## 2024-08-14 LAB
ANION GAP SERPL CALC-SCNC: 10 MMOL/L (ref 7–16)
BUN SERPL-MCNC: 11 MG/DL (ref 8–22)
CALCIUM SERPL-MCNC: 9 MG/DL (ref 8.4–10.2)
CHLORIDE SERPL-SCNC: 106 MMOL/L (ref 96–112)
CO2 SERPL-SCNC: 25 MMOL/L (ref 20–33)
CREAT SERPL-MCNC: 0.95 MG/DL (ref 0.5–1.4)
ERYTHROCYTE [DISTWIDTH] IN BLOOD BY AUTOMATED COUNT: 44.3 FL (ref 35.9–50)
GFR SERPLBLD CREATININE-BSD FMLA CKD-EPI: 111 ML/MIN/1.73 M 2
GLUCOSE SERPL-MCNC: 86 MG/DL (ref 65–99)
HCT VFR BLD AUTO: 47.3 % (ref 42–52)
HGB BLD-MCNC: 15.4 G/DL (ref 14–18)
MCH RBC QN AUTO: 29.6 PG (ref 27–33)
MCHC RBC AUTO-ENTMCNC: 32.6 G/DL (ref 32.3–36.5)
MCV RBC AUTO: 91 FL (ref 81.4–97.8)
PLATELET # BLD AUTO: 341 K/UL (ref 164–446)
PMV BLD AUTO: 10 FL (ref 9–12.9)
POTASSIUM SERPL-SCNC: 4.4 MMOL/L (ref 3.6–5.5)
RBC # BLD AUTO: 5.2 M/UL (ref 4.7–6.1)
SODIUM SERPL-SCNC: 141 MMOL/L (ref 135–145)
WBC # BLD AUTO: 8.1 K/UL (ref 4.8–10.8)

## 2024-08-14 PROCEDURE — 85027 COMPLETE CBC AUTOMATED: CPT

## 2024-08-14 PROCEDURE — 36415 COLL VENOUS BLD VENIPUNCTURE: CPT

## 2024-08-14 PROCEDURE — 80048 BASIC METABOLIC PNL TOTAL CA: CPT

## 2024-08-28 ENCOUNTER — APPOINTMENT (OUTPATIENT)
Dept: RADIOLOGY | Facility: MEDICAL CENTER | Age: 29
End: 2024-08-28
Attending: INTERNAL MEDICINE
Payer: COMMERCIAL

## 2024-08-28 ENCOUNTER — ANESTHESIA (OUTPATIENT)
Dept: SURGERY | Facility: MEDICAL CENTER | Age: 29
End: 2024-08-28
Payer: COMMERCIAL

## 2024-08-28 ENCOUNTER — HOSPITAL ENCOUNTER (OUTPATIENT)
Facility: MEDICAL CENTER | Age: 29
End: 2024-08-28
Attending: INTERNAL MEDICINE | Admitting: INTERNAL MEDICINE
Payer: COMMERCIAL

## 2024-08-28 ENCOUNTER — ANESTHESIA EVENT (OUTPATIENT)
Dept: SURGERY | Facility: MEDICAL CENTER | Age: 29
End: 2024-08-28
Payer: COMMERCIAL

## 2024-08-28 VITALS
OXYGEN SATURATION: 98 % | WEIGHT: 241.07 LBS | HEART RATE: 62 BPM | RESPIRATION RATE: 16 BRPM | DIASTOLIC BLOOD PRESSURE: 88 MMHG | SYSTOLIC BLOOD PRESSURE: 138 MMHG | BODY MASS INDEX: 35.86 KG/M2 | TEMPERATURE: 97.2 F

## 2024-08-28 PROCEDURE — 700105 HCHG RX REV CODE 258: Performed by: INTERNAL MEDICINE

## 2024-08-28 PROCEDURE — 110371 HCHG SHELL REV 272: Performed by: INTERNAL MEDICINE

## 2024-08-28 PROCEDURE — 160048 HCHG OR STATISTICAL LEVEL 1-5: Performed by: INTERNAL MEDICINE

## 2024-08-28 PROCEDURE — 160203 HCHG ENDO MINUTES - 1ST 30 MINS LEVEL 4: Performed by: INTERNAL MEDICINE

## 2024-08-28 PROCEDURE — 700101 HCHG RX REV CODE 250: Performed by: ANESTHESIOLOGY

## 2024-08-28 PROCEDURE — 160009 HCHG ANES TIME/MIN: Performed by: INTERNAL MEDICINE

## 2024-08-28 PROCEDURE — 160208 HCHG ENDO MINUTES - EA ADDL 1 MIN LEVEL 4: Performed by: INTERNAL MEDICINE

## 2024-08-28 PROCEDURE — 160025 RECOVERY II MINUTES (STATS): Performed by: INTERNAL MEDICINE

## 2024-08-28 PROCEDURE — C1748 ENDOSCOPE, SINGLE, UGI: HCPCS | Performed by: INTERNAL MEDICINE

## 2024-08-28 PROCEDURE — 160002 HCHG RECOVERY MINUTES (STAT): Performed by: INTERNAL MEDICINE

## 2024-08-28 PROCEDURE — 160046 HCHG PACU - 1ST 60 MINS PHASE II: Performed by: INTERNAL MEDICINE

## 2024-08-28 PROCEDURE — 700111 HCHG RX REV CODE 636 W/ 250 OVERRIDE (IP): Performed by: ANESTHESIOLOGY

## 2024-08-28 PROCEDURE — 160035 HCHG PACU - 1ST 60 MINS PHASE I: Performed by: INTERNAL MEDICINE

## 2024-08-28 PROCEDURE — C1769 GUIDE WIRE: HCPCS | Performed by: INTERNAL MEDICINE

## 2024-08-28 RX ORDER — SODIUM CHLORIDE, SODIUM LACTATE, POTASSIUM CHLORIDE, CALCIUM CHLORIDE 600; 310; 30; 20 MG/100ML; MG/100ML; MG/100ML; MG/100ML
INJECTION, SOLUTION INTRAVENOUS CONTINUOUS
Status: DISCONTINUED | OUTPATIENT
Start: 2024-08-28 | End: 2024-08-28 | Stop reason: HOSPADM

## 2024-08-28 RX ORDER — LIDOCAINE HYDROCHLORIDE 20 MG/ML
INJECTION, SOLUTION EPIDURAL; INFILTRATION; INTRACAUDAL; PERINEURAL PRN
Status: DISCONTINUED | OUTPATIENT
Start: 2024-08-28 | End: 2024-08-28 | Stop reason: SURG

## 2024-08-28 RX ORDER — ONDANSETRON 2 MG/ML
4 INJECTION INTRAMUSCULAR; INTRAVENOUS
Status: DISCONTINUED | OUTPATIENT
Start: 2024-08-28 | End: 2024-08-28 | Stop reason: HOSPADM

## 2024-08-28 RX ORDER — LIDOCAINE HYDROCHLORIDE 40 MG/ML
SOLUTION TOPICAL PRN
Status: DISCONTINUED | OUTPATIENT
Start: 2024-08-28 | End: 2024-08-28 | Stop reason: SURG

## 2024-08-28 RX ORDER — HALOPERIDOL 5 MG/ML
1 INJECTION INTRAMUSCULAR
Status: DISCONTINUED | OUTPATIENT
Start: 2024-08-28 | End: 2024-08-28 | Stop reason: HOSPADM

## 2024-08-28 RX ORDER — DEXAMETHASONE SODIUM PHOSPHATE 4 MG/ML
INJECTION, SOLUTION INTRA-ARTICULAR; INTRALESIONAL; INTRAMUSCULAR; INTRAVENOUS; SOFT TISSUE PRN
Status: DISCONTINUED | OUTPATIENT
Start: 2024-08-28 | End: 2024-08-28 | Stop reason: SURG

## 2024-08-28 RX ORDER — MEPERIDINE HYDROCHLORIDE 25 MG/ML
12.5 INJECTION INTRAMUSCULAR; INTRAVENOUS; SUBCUTANEOUS
Status: DISCONTINUED | OUTPATIENT
Start: 2024-08-28 | End: 2024-08-28 | Stop reason: HOSPADM

## 2024-08-28 RX ORDER — ACETAMINOPHEN 500 MG
1000 TABLET ORAL
Status: DISCONTINUED | OUTPATIENT
Start: 2024-08-28 | End: 2024-08-28 | Stop reason: HOSPADM

## 2024-08-28 RX ORDER — MIDAZOLAM HYDROCHLORIDE 1 MG/ML
INJECTION INTRAMUSCULAR; INTRAVENOUS PRN
Status: DISCONTINUED | OUTPATIENT
Start: 2024-08-28 | End: 2024-08-28 | Stop reason: SURG

## 2024-08-28 RX ADMIN — SUGAMMADEX 200 MG: 100 INJECTION, SOLUTION INTRAVENOUS at 15:49

## 2024-08-28 RX ADMIN — FENTANYL CITRATE 100 MCG: 50 INJECTION, SOLUTION INTRAMUSCULAR; INTRAVENOUS at 15:36

## 2024-08-28 RX ADMIN — LIDOCAINE HYDROCHLORIDE 100 MG: 20 INJECTION, SOLUTION EPIDURAL; INFILTRATION; INTRACAUDAL at 15:36

## 2024-08-28 RX ADMIN — SODIUM CHLORIDE, POTASSIUM CHLORIDE, SODIUM LACTATE AND CALCIUM CHLORIDE: 600; 310; 30; 20 INJECTION, SOLUTION INTRAVENOUS at 15:31

## 2024-08-28 RX ADMIN — ROCURONIUM BROMIDE 30 MG: 10 INJECTION, SOLUTION INTRAVENOUS at 15:36

## 2024-08-28 RX ADMIN — PROPOFOL 180 MG: 10 INJECTION, EMULSION INTRAVENOUS at 15:36

## 2024-08-28 RX ADMIN — DEXAMETHASONE SODIUM PHOSPHATE 4 MG: 4 INJECTION INTRA-ARTICULAR; INTRALESIONAL; INTRAMUSCULAR; INTRAVENOUS; SOFT TISSUE at 15:43

## 2024-08-28 RX ADMIN — LIDOCAINE HYDROCHLORIDE 4 ML: 40 SOLUTION TOPICAL at 15:39

## 2024-08-28 RX ADMIN — MIDAZOLAM HYDROCHLORIDE 2 MG: 2 INJECTION, SOLUTION INTRAMUSCULAR; INTRAVENOUS at 15:31

## 2024-08-28 ASSESSMENT — PAIN DESCRIPTION - PAIN TYPE
TYPE: SURGICAL PAIN
TYPE: SURGICAL PAIN

## 2024-08-28 ASSESSMENT — PAIN SCALES - GENERAL: PAIN_LEVEL: 0

## 2024-08-28 ASSESSMENT — FIBROSIS 4 INDEX: FIB4 SCORE: 0.43

## 2024-08-28 NOTE — OR SURGEON
ERCP operative note    PreOp Diagnosis: History of postoperative bile leak with stenting      PostOp Diagnosis: Same      Procedure(s):  RECALL FOR ENDOSCOPIC RETROGRADE CHOLANGIOPANCREATOGRAPHY FOR BILIARY STENT EXCHANGE - Wound Class: None    Surgeon(s):  Robbie Lacy M.D.    Anesthesiologist/Type of Anesthesia:  Anesthesiologist: Zabrina Ugarte M.D./General    Surgical Staff:  Circulator: Francisco Lee R.N.  Endoscopy Technician: Chanelle Moreira; Judith You    Specimens removed if any:  * No specimens in log *    Dr. Lacy  [facility]  YOLANDA Yanez  [phone]    ERCP with: Bile duct stent extraction    Bile duct balloon sweep    Radiologic interpretation of static and dynamic fluoroscopic images    Indication: History of postoperative bile leak with ERCP and stent placement now for revision    Sedation: GA    Findings:    Ampulla     Bile duct stent in place occluded     Postsphincterotomy anatomy    Pancreatic duct     Neither injected or cannulated    CBD     Normal course and caliber     No filling defects     No bile leak     Therapeutics    Removal of bile duct stent with snare    9-12 mm occlusion balloon sweeps of duct x 3 negative for stones    Plan:  Follow liver enzymes    Follow-up with GI as needed    Procedure detail:    Prior to procedure, informed consent was obtained.  Risks, benefits, alternatives, including but not limited to risk of bleeding, infection, perforation, adverse reaction to sedating medicine, failure to identify pathology, pancreatitis and death were explained to the patient who accepted all risks.    Patient was prepped in the prone position after intubation and sedation was provided by anesthesia.    The scope tip of the Enervee exalt single use side-viewing duodena scope was passed to the level of the ampulla in the short position after the gastric pool was suctioned dry.  The ampulla had a postsphincterotomy anatomy and the  previously placed bile duct stent was in good position but appeared occluded.  This was snared at the scope tip and removed through the scope channel intact.  A 9-12 mm occlusion balloon loaded with a 0.035 wire was utilized for selective cannulation of the common bile duct.  This was achieved on the first attempt.  The balloon was inflated and injection of contrast was made demonstrating a normal course and caliber of the bile duct and normal intrahepatic biliary tree.  There was no evidence of bile leak.  The balloon was deflated and passed to the common hepatic duct and inflated to 12 mm.  This was withdrawn down the duct without resistance and the 12 mm balloon pulled through the ampulla with ease.  No stones or debris were seen.  2 additional sweeps were made both of which were negative.  The procedure was deemed complete.  Air and liquid resection.  The scope was withdrawn.  Patient tolerated the procedure well and was sent to recovery without immediate complications.      8/28/2024 3:53 PM Robbie Lacy M.D.

## 2024-08-28 NOTE — DISCHARGE INSTRUCTIONS
What to Expect Post Anesthesia    Rest and take it easy for the first 24 hours.  A responsible adult is recommended to remain with you during that time.  It is normal to feel sleepy.  We encourage you to not do anything that requires balance, judgment or coordination.    FOR 24 HOURS DO NOT:  Drive, operate machinery or run household appliances.  Drink beer or alcoholic beverages.  Make important decisions or sign legal documents.    To avoid nausea, slowly advance diet as tolerated, avoiding spicy or greasy foods for the first day.  Add more substantial food to your diet according to your provider's instructions.  Babies can be fed formula or breast milk as soon as they are hungry.  INCREASE FLUIDS AND FIBER TO AVOID CONSTIPATION.    MILD FLU-LIKE SYMPTOMS ARE NORMAL.  YOU MAY EXPERIENCE GENERALIZED MUSCLE ACHES, THROAT IRRITATION, HEADACHE AND/OR SOME NAUSEA.    If any questions arise, call your provider.  If your provider is not available, please feel free to call the Surgical Center at [phone].    MEDICATIONS: Resume taking daily medication.  Take prescribed pain medication with food.  If no medication is prescribed, you may take non-aspirin pain medication if needed.  PAIN MEDICATION CAN BE VERY CONSTIPATING.  Take a stool softener or laxative such as senokot, pericolace, or milk of magnesia if needed.    Last pain medication given at: N/A    ENDOSCOPY HOME CARE INSTRUCTIONS    GASTROSCOPY OR ERCP  1. Don't eat or drink anything for about an hour after the test. You can then resume your regular diet.  2. Don't drive or drink alcohol for 24 hours. The medication you received will make you too drowsy.  3. Don't take any coffee, tea, or aspirin products until after you see your doctor. These can harm the lining of your stomach.  4. If you begin to vomit bloody material, or develop black or bloody stools, call your doctor as soon as possible.  5. If you have any neck, chest, abdominal pain or temp of 100  degrees, call your doctor.  6. See your doctor as previously scheduled.    Findings:                          Ampulla                                      Bile duct stent in place occluded                                      Postsphincterotomy anatomy                          Pancreatic duct                                      Neither injected or cannulated                          CBD                                      Normal course and caliber                                      No filling defects                                      No bile leak                 Therapeutics                          Removal of bile duct stent with snare                          9-12 mm occlusion balloon sweeps of duct x 3 negative for stones     Plan:                Follow liver enzymes                          Follow-up with GI as needed    You should call 911 if you develop problems with breathing or chest pain.  If any questions arise, call your doctor. You can also call the HEALTH HOTLINE open 24 hours/day, 7 days/week and speak to a nurse at [phone], or toll free [phone].

## 2024-08-28 NOTE — OR NURSING
1626: care assumed of awake pt sitting up using phone.Denies pain/nausea   1630: Meets criteria to transfer to Stage 2

## 2024-08-28 NOTE — ANESTHESIA PROCEDURE NOTES
Airway    Date/Time: 8/28/2024 3:39 PM    Performed by: Zabrina Ugarte M.D.  Authorized by: Zabrina Ugarte M.D.    Location:  OR  Urgency:  Elective  Indications for Airway Management:  Anesthesia      Spontaneous Ventilation: absent    Sedation Level:  Deep  Preoxygenated: Yes    Patient Position:  Sniffing  Mask Difficulty Assessment:  1 - vent by mask  Final Airway Type:  Endotracheal airway  Final Endotracheal Airway:  ETT  Cuffed: Yes    Technique Used for Successful ETT Placement:  Direct laryngoscopy  Devices/Methods Used in Placement:  Intubating stylet    Insertion Site:  Oral  Blade Type:  Ibeth  Laryngoscope Blade/Videolaryngoscope Blade Size:  3  ETT Size (mm):  7.5  Measured from:  Teeth  ETT to Teeth (cm):  21  Placement Verified by: capnometry and palpation of cuff    Cormack-Lehane Classification:  Grade IIa - partial view of glottis  Number of Attempts at Approach:  1

## 2024-08-28 NOTE — OR NURSING
1644: Patient arrived to phase II from PACU 1 via gurney. Report received from RN. Respirations are spontaneous and unlabored. VSS on RA.     1657: Family at bedside.     1702: Patient education completed, family denies further questions. IV removed with tip intact. DC'd to care of family post uneventful stay in PACU 2.

## 2024-08-28 NOTE — OR NURSING
1601: Patient arrived to PACU from endo via gurney. Report received from anesthesia and RN. Respirations are spontaneous and unlabored. VSS on 6L.     1619: Mom updated via phone.     1626: Report to Shahrzad ALATORRE.

## 2024-08-28 NOTE — ANESTHESIA TIME REPORT
Anesthesia Start and Stop Event Times       Date Time Event    8/28/2024 1451 Ready for Procedure     1531 Anesthesia Start     1603 Anesthesia Stop          Responsible Staff  08/28/24      Name Role Begin End    Zabrina Ugarte M.D. Anesth 1531 1603          Overtime Reason:  no overtime (within assigned shift)    Comments:

## 2024-08-28 NOTE — ANESTHESIA PREPROCEDURE EVALUATION
Case: [id number] Date/Time: 08/28/24 1515    Procedure: RECALL FOR ENDOSCOPIC RETROGRADE CHOLANGIOPANCREATOGRAPHY FOR BILIARY STENT EXCHANGE    Pre-op diagnosis: BILE LEAK, POST OPERATIVE    Location:  ENDOSCOPIC ULTRASOUND ROOM / SURGERY [facility]    Surgeons: Robbie Lacy M.D.          28 yo w/hx of cholecystectomy and post op bile leak.  Common bile duct stent in situ    Relevant Problems   ANESTHESIA (within normal limits)       Physical Exam    Airway   Mallampati: II  TM distance: >3 FB  Neck ROM: full       Cardiovascular   Rhythm: regular  Rate: normal  (-) murmur     Dental         Facial Hair   Pulmonary   Breath sounds clear to auscultation     Abdominal    Neurological - normal exam                   Anesthesia Plan    ASA 2       Plan - general       Airway plan will be ETT          Induction: intravenous      Pertinent diagnostic labs and testing reviewed    Informed Consent:    Anesthetic plan and risks discussed with patient.    Use of blood products discussed with: patient whom consented to blood products.

## 2024-08-29 NOTE — ANESTHESIA POSTPROCEDURE EVALUATION
Patient: Jayden Nielsen    Procedure Summary       Date: 08/28/24 Room / Location:  ENDOSCOPIC ULTRASOUND ROOM / SURGERY [facility]    Anesthesia Start: 1531 Anesthesia Stop: 1603    Procedure: RECALL FOR ENDOSCOPIC RETROGRADE CHOLANGIOPANCREATOGRAPHY FOR BILIARY STENT EXCHANGE Diagnosis:       Bile leak      (Bile leak [[id number]])    Surgeons: Robbie Lacy M.D. Responsible Provider: Zabrina Ugarte M.D.    Anesthesia Type: general ASA Status: 2            Final Anesthesia Type: general  Last vitals  BP   Blood Pressure: 138/88    Temp   36.2 °C (97.2 °F)    Pulse   62   Resp   16    SpO2   98 %      Anesthesia Post Evaluation    Patient location during evaluation: PACU  Patient participation: complete - patient participated  Level of consciousness: awake  Pain score: 0    Airway patency: patent  Anesthetic complications: no  Cardiovascular status: hemodynamically stable  Respiratory status: acceptable  Hydration status: acceptable    PONV: none          No notable events documented.     Nurse Pain Score: 0 (NPRS)

## 2024-10-15 ENCOUNTER — IMMUNIZATION (OUTPATIENT)
Dept: OCCUPATIONAL MEDICINE | Facility: CLINIC | Age: 29
End: 2024-10-15

## 2024-10-15 DIAGNOSIS — Z23 NEED FOR VACCINATION: Primary | ICD-10-CM

## 2024-10-15 PROCEDURE — 90656 IIV3 VACC NO PRSV 0.5 ML IM: CPT | Performed by: PREVENTIVE MEDICINE

## 2025-02-10 ENCOUNTER — EH NON-PROVIDER (OUTPATIENT)
Dept: OCCUPATIONAL MEDICINE | Facility: CLINIC | Age: 30
End: 2025-02-10

## 2025-04-25 ENCOUNTER — OFFICE VISIT (OUTPATIENT)
Dept: MEDICAL GROUP | Facility: LAB | Age: 30
End: 2025-04-25
Payer: COMMERCIAL

## 2025-04-25 VITALS
HEART RATE: 99 BPM | BODY MASS INDEX: 38.24 KG/M2 | SYSTOLIC BLOOD PRESSURE: 124 MMHG | HEIGHT: 69 IN | OXYGEN SATURATION: 97 % | DIASTOLIC BLOOD PRESSURE: 64 MMHG | TEMPERATURE: 97.7 F | WEIGHT: 258.2 LBS | RESPIRATION RATE: 16 BRPM

## 2025-04-25 DIAGNOSIS — F41.9 ANXIETY AND DEPRESSION: ICD-10-CM

## 2025-04-25 DIAGNOSIS — F32.A ANXIETY AND DEPRESSION: ICD-10-CM

## 2025-04-25 DIAGNOSIS — R05.3 CHRONIC COUGH: ICD-10-CM

## 2025-04-25 PROCEDURE — 3078F DIAST BP <80 MM HG: CPT | Performed by: PHYSICIAN ASSISTANT

## 2025-04-25 PROCEDURE — 99214 OFFICE O/P EST MOD 30 MIN: CPT | Performed by: PHYSICIAN ASSISTANT

## 2025-04-25 PROCEDURE — 3074F SYST BP LT 130 MM HG: CPT | Performed by: PHYSICIAN ASSISTANT

## 2025-04-25 RX ORDER — PROPRANOLOL HYDROCHLORIDE 10 MG/1
10 TABLET ORAL 3 TIMES DAILY
Qty: 90 TABLET | Refills: 0 | Status: SHIPPED | OUTPATIENT
Start: 2025-04-25

## 2025-04-25 RX ORDER — SERTRALINE HYDROCHLORIDE 25 MG/1
25 TABLET, FILM COATED ORAL DAILY
Qty: 90 TABLET | Refills: 0 | Status: SHIPPED | OUTPATIENT
Start: 2025-04-25

## 2025-04-25 ASSESSMENT — PATIENT HEALTH QUESTIONNAIRE - PHQ9
5. POOR APPETITE OR OVEREATING: 0 - NOT AT ALL
CLINICAL INTERPRETATION OF PHQ2 SCORE: 2

## 2025-04-25 ASSESSMENT — ANXIETY QUESTIONNAIRES
4. TROUBLE RELAXING: SEVERAL DAYS
1. FEELING NERVOUS, ANXIOUS, OR ON EDGE: MORE THAN HALF THE DAYS
IF YOU CHECKED OFF ANY PROBLEMS ON THIS QUESTIONNAIRE, HOW DIFFICULT HAVE THESE PROBLEMS MADE IT FOR YOU TO DO YOUR WORK, TAKE CARE OF THINGS AT HOME, OR GET ALONG WITH OTHER PEOPLE: VERY DIFFICULT
7. FEELING AFRAID AS IF SOMETHING AWFUL MIGHT HAPPEN: SEVERAL DAYS
GAD7 TOTAL SCORE: 7
2. NOT BEING ABLE TO STOP OR CONTROL WORRYING: SEVERAL DAYS
6. BECOMING EASILY ANNOYED OR IRRITABLE: SEVERAL DAYS
5. BEING SO RESTLESS THAT IT IS HARD TO SIT STILL: NOT AT ALL
3. WORRYING TOO MUCH ABOUT DIFFERENT THINGS: SEVERAL DAYS

## 2025-04-25 ASSESSMENT — FIBROSIS 4 INDEX: FIB4 SCORE: 0.45

## 2025-04-25 NOTE — PROGRESS NOTES
Subjective:     CC: several concerns    HPI:   Jayden here today with Verbal consent was acquired by the patient to use [employer] ambient listening note generation during this visit Yes   04/28/2025 06/02/2025    History of Present Illness  The patient presents for evaluation of anxiety, depression, chronic cough, and loose bowel movements.    Anxiety and Depression  - Recent exacerbation of longstanding depression, with significant anxiety impacting professional performance.  - Reports sleep disturbances, especially before work shifts, with sleep duration often limited to 2-4 hours.  - Despite a previous 3-week personal leave, symptoms have returned.  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  - Professional responsibilities and patient safety are difficult to maintain.  - No prior medication for anxiety and depression due to a cousin's challenging experience with similar symptoms and medication side effects.  - Attempts to balance life circumstances and manage stress have been made.  - Since surgery last year, negative self-image about the body has been reported.  - No suicidal ideation or intent, but a desire to manage stress and anxiety to improve functionality.  - Contact with a counselor through the workplace has been initiated, and openness to trying both daily and as needed medication  - A long-term solution, potentially involving a career change, is acknowledged but current limitations due to corrective action status are recognized.    Chronic Cough  - Chronic cough since influenza in 02/2025, believed to be exacerbated by anxiety.  - Heavy inspiration to calm heart rate irritates the throat.  - Cough has not resolved.  - Cough drops and Cepacol throat spray used for relief.  - Sinus congestion and runny nose, particularly at night and when fatigued.  - No humidifier used during  sleep, but a fan is used for temperature control.  - No antihistamines or nasal sprays currently used.  - Albuterol inhaler available but avoided due to palpitations.    Occasional Acid Reflux  - Believed to be diet-related and due to slower digestion post-surgery, resulting in gas and reflux.    Supplemental information: None.    FAMILY HISTORY  Cousin with anxiety and depression for social reasons.         4/25/2025     9:07 AM    RADHA-7 ANXIETY SCALE FLOWSHEET   Feeling nervous, anxious, or on edge 2   Not being able to stop or control worrying 1   Worrying too much about different things 1   Trouble relaxing 1   Being so restless that it is hard to sit still 0   Becoming easily annoyed or irritable 1   Feeling afraid as if something awful might happen 1   RADHA-7 Total Score 7   How difficult have these problems made it for you to do your work, take care of things at home, or get along with other people? Very difficult       Interpretation of RADHA-7 Total Score   Score Severity   0-4 Minimal Anxiety  5-9 Mild Anxiety   10-14 Moderate Anxiety  15-21 Severy Anxiety          4/25/2025     9:00 AM 5/7/2024     1:00 PM 5/14/2020     1:00 PM   PHQ-9 Screening   Little interest or pleasure in doing things 1 - several days 0 - not at all 0 - not at all   Feeling down, depressed, or hopeless 1 - several days 0 - not at all 0 - not at all   Trouble falling or staying asleep, or sleeping too much 1 - several days     Feeling tired or having little energy 2 - more than half the days     Poor appetite or overeating 0 - not at all     Trouble concentrating on things, such as reading the newspaper or watching television 2 - more than half the days     Moving or speaking so slowly that other people could have noticed. Or the opposite - being so fidgety or restless that you have been moving around a lot more than usual 1 - several days     Thoughts that you would be better off dead, or of hurting yourself in some way 0 - not at  all     PHQ-2 Total Score 2 0 0       Interpretation of PHQ-9 Total Score   Score Severity   1-4 No Depression   5-9 Mild Depression   10-14 Moderate Depression   15-19 Moderately Severe Depression   20-27 Severe Depression      ROS:  Gen: no fevers/chills, no changes in weight  Eyes: no changes in vision  ENT: no sore throat, no hearing loss, no bloody nose  Pulm: no sob, no cough  CV: no chest pain, no palpitations  GI: no nausea/vomiting, no diarrhea  : no dysuria  MSk: no myalgias  Skin: no rash  Neuro: no headaches, no numbness/tingling  Heme/Lymph: no easy bruising    Current Outpatient Medications Ordered in Epic   Medication Sig Dispense Refill    SIMETHICONE PO Take  by mouth. DO NOT TAKE DAY OF SURGERY      ibuprofen (MOTRIN) 200 MG Tab Take 400 mg by mouth 1 time a day as needed for Mild Pain. MEDICATION INSTRUCTIONS FOR SURGERY/PROCEDURE SCHEDULED FOR 8/28/24   DO NOT TAKE 5 DAYS PRIOR TO SURGERY, MAY TAKE TYLENOL      bismuth subsalicylate (PEPTO-BISMOL) 262 MG/15ML Suspension Take 15 mL by mouth 2 times a day as needed. DO NOT TAKE DAY OF SURGERY      omeprazole (PRILOSEC) 40 MG delayed-release capsule Take 1 Capsule by mouth every day. (Patient taking differently: Take 40 mg by mouth every day. MEDICATION INSTRUCTIONS FOR SURGERY/PROCEDURE SCHEDULED FOR 8/28/24   OK TO CONTINUE TAKING PRIOR TO SURGERY AND DAY OF SURGERY) 30 Capsule 3    albuterol 108 (90 Base) MCG/ACT Aero Soln inhalation aerosol Inhale 2 Puffs every 6 hours as needed for Shortness of Breath. (Patient taking differently: Inhale 2 Puffs every 6 hours as needed for Shortness of Breath. TAKE IF NEEDED MORNING OF SURGERY) 8.5 g 0    senna-docusate (PERICOLACE OR SENOKOT S) 8.6-50 MG Tab Take 1 Tablet by mouth every day. Take as directed on the bottle. Take while on narcotics to avoid constipation. (Patient not taking: Reported on 8/12/2024) 30 Tablet 1    dicyclomine (BENTYL) 10 MG Cap Take 1 Capsule by mouth 4 Times a Day,Before Meals  "and at Bedtime. (Patient not taking: Reported on 4/25/2025) 120 Capsule 0    HYDROcodone-acetaminophen (NORCO) 5-325 MG Tab per tablet Take 1 Tablet by mouth 3 times a day as needed. Indications: Pain (Patient not taking: Reported on 8/12/2024)      sucralfate (CARAFATE) 1 GM/10ML Suspension Take 10 mL by mouth every 6 hours as needed (abdominal pain). (Patient not taking: Reported on 8/12/2024) 420 mL 0    ondansetron (ZOFRAN) 4 MG Tab tablet Take 1 Tablet by mouth every four hours as needed for Nausea/Vomiting. (Patient not taking: Reported on 8/12/2024) 15 Tablet 0     No current Epic-ordered facility-administered medications on file.       Health Maintenance: Completed    Objective:     Exam:  /64 (BP Location: Right arm, Patient Position: Sitting, BP Cuff Size: Large adult)   Pulse 99   Temp 36.5 °C (97.7 °F) (Temporal)   Resp 16   Ht 1.753 m (5' 9\")   Wt 117 kg (258 lb 3.2 oz)   SpO2 97%   BMI 38.13 kg/m²  Body mass index is 38.13 kg/m².    Gen: Alert and oriented, No apparent distress.  Neck: Neck is supple without lymphadenopathy.  Lungs: Normal effort, CTA bilaterally, no wheezes, rhonchi, or rales  CV: Regular rate and rhythm. No murmurs, rubs, or gallops.  Ext: No clubbing, cyanosis, edema.      Assessment & Plan:     30 y.o. male with the following -     1. Anxiety and depression  sertraline (ZOLOFT) 25 MG tablet    propranolol (INDERAL) 10 MG Tab      2. Chronic cough            Assessment & Plan  1. Anxiety.  Significant anxiety interfering with job performance, leading to corrective action at work. Symptoms include trouble sleeping, excessive worrying, and difficulty relaxing.  Diagnostic plan: RADHA-7 assessment conducted.  Treatment plan: Discussed starting SSRI or SNRI medications like Zoloft, Lexapro, Celexa, etc. Prescribed sertraline 25 mg daily and propranolol 10 mg three times a day as needed for acute anxiety symptoms.  Clinical decision making: Potential side effects discussed " include headache, nausea, and sexual side effects. Follow-up in 3 to 4 weeks to assess medication effectiveness and make necessary adjustments.    2. Depression.  Low motivation and feelings of being unable to function at work.  Diagnostic plan: PHQ-9 assessment conducted.  Treatment plan: Prescribed sertraline 25 mg daily. Counseling or therapy recommended, and contact with a counselor provided through work has been initiated.  Clinical decision making: Discussed potential benefits and side effects of sertraline.    3. Chronic cough.  Persistent cough since flu in 02/2025, possibly exacerbated by postnasal drainage.  Treatment plan: Advised to use fluticasone nasal spray and consider using a humidifier at night to alleviate symptoms.      Follow-up: Follow-up in 3 to 4 weeks.      I spent a total of 25 minutes with record review, exam, communication with the patient, communication with other providers, and documentation of this encounter.      No follow-ups on file.    Please note that this dictation was created using voice recognition software. I have made every reasonable attempt to correct obvious errors, but there may be errors of grammar and possibly content that I did not discover before finalizing the note.

## 2025-05-30 ENCOUNTER — OFFICE VISIT (OUTPATIENT)
Dept: MEDICAL GROUP | Facility: LAB | Age: 30
End: 2025-05-30
Payer: COMMERCIAL

## 2025-05-30 VITALS
RESPIRATION RATE: 14 BRPM | BODY MASS INDEX: 38.69 KG/M2 | HEIGHT: 69 IN | HEART RATE: 91 BPM | DIASTOLIC BLOOD PRESSURE: 70 MMHG | OXYGEN SATURATION: 96 % | WEIGHT: 261.25 LBS | SYSTOLIC BLOOD PRESSURE: 118 MMHG | TEMPERATURE: 97.6 F

## 2025-05-30 DIAGNOSIS — Z13.1 DIABETES MELLITUS SCREENING: ICD-10-CM

## 2025-05-30 DIAGNOSIS — F41.9 ANXIETY AND DEPRESSION: Primary | ICD-10-CM

## 2025-05-30 DIAGNOSIS — F32.A ANXIETY AND DEPRESSION: Primary | ICD-10-CM

## 2025-05-30 DIAGNOSIS — Z13.29 SCREENING FOR THYROID DISORDER: ICD-10-CM

## 2025-05-30 DIAGNOSIS — Z13.21 ENCOUNTER FOR VITAMIN DEFICIENCY SCREENING: ICD-10-CM

## 2025-05-30 DIAGNOSIS — Z13.220 LIPID SCREENING: ICD-10-CM

## 2025-05-30 RX ORDER — ESCITALOPRAM OXALATE 10 MG/1
10 TABLET ORAL DAILY
Qty: 30 TABLET | Refills: 1 | Status: SHIPPED | OUTPATIENT
Start: 2025-05-30

## 2025-05-30 ASSESSMENT — ANXIETY QUESTIONNAIRES
IF YOU CHECKED OFF ANY PROBLEMS ON THIS QUESTIONNAIRE, HOW DIFFICULT HAVE THESE PROBLEMS MADE IT FOR YOU TO DO YOUR WORK, TAKE CARE OF THINGS AT HOME, OR GET ALONG WITH OTHER PEOPLE: VERY DIFFICULT
5. BEING SO RESTLESS THAT IT IS HARD TO SIT STILL: SEVERAL DAYS
2. NOT BEING ABLE TO STOP OR CONTROL WORRYING: SEVERAL DAYS
1. FEELING NERVOUS, ANXIOUS, OR ON EDGE: MORE THAN HALF THE DAYS
3. WORRYING TOO MUCH ABOUT DIFFERENT THINGS: NOT AT ALL
6. BECOMING EASILY ANNOYED OR IRRITABLE: SEVERAL DAYS
GAD7 TOTAL SCORE: 6
7. FEELING AFRAID AS IF SOMETHING AWFUL MIGHT HAPPEN: NOT AT ALL
4. TROUBLE RELAXING: SEVERAL DAYS

## 2025-05-30 ASSESSMENT — PATIENT HEALTH QUESTIONNAIRE - PHQ9
SUM OF ALL RESPONSES TO PHQ QUESTIONS 1-9: 10
5. POOR APPETITE OR OVEREATING: 1 - SEVERAL DAYS
CLINICAL INTERPRETATION OF PHQ2 SCORE: 2

## 2025-05-30 ASSESSMENT — FIBROSIS 4 INDEX: FIB4 SCORE: 0.45

## 2025-05-30 NOTE — PROGRESS NOTES
Subjective:     CC: Follow-up anxiety and depression    HPI:   Jayden here today with   Verbal consent was acquired by the patient to use [employer] ambient listening note generation during this visit Yes     History of Present Illness  The patient presents for follow-up regarding anxiety and depression.    Anxiety and Depression  - No significant change in condition since last visit.  - Prescribed Zoloft 25 mg and propranolol as needed, up to 3 times daily.  - Propranolol effective for physical anxiety symptoms, but emotional and cognitive aspects persist, particularly intrusive thoughts disrupting sleep.  - Concerned about returning to work in 4 days, feeling unprepared for patient care, exacerbating anxiety and stress.  - Completed leave paperwork and plans to discuss with manager.  - Utilizing Employee Assistance Program for stress reduction but uncertain of effectiveness.  - Open to referral for therapy/counseling for additional support.  - No refill of propranolol needed, used only 7-8 doses.  - On Zoloft for a month with no significant improvement, increased somnolence, and gastric reflux.      Supplemental information: Considering lab tests to rule out hyperthyroidism or hypothyroidism, interested in checking vitamin B, D, and magnesium levels. Contemplating disability consideration.         5/30/2025     8:21 AM 4/25/2025     9:07 AM    RADHA-7 ANXIETY SCALE FLOWSHEET   Feeling nervous, anxious, or on edge 2 2   Not being able to stop or control worrying 1 1   Worrying too much about different things 0 1   Trouble relaxing 1 1   Being so restless that it is hard to sit still 1 0   Becoming easily annoyed or irritable 1 1   Feeling afraid as if something awful might happen 0 1   RADHA-7 Total Score 6 7   How difficult have these problems made it for you to do your work, take care of things at home, or get along with other people? Very difficult Very difficult       Interpretation of RADHA-7 Total Score   Score  Severity   0-4 Minimal Anxiety  5-9 Mild Anxiety   10-14 Moderate Anxiety  15-21 Severy Anxiety          5/30/2025     8:20 AM 4/25/2025     9:00 AM 5/7/2024     1:00 PM 5/14/2020     1:00 PM   PHQ-9 Screening   Little interest or pleasure in doing things 1 - several days 1 - several days 0 - not at all 0 - not at all   Feeling down, depressed, or hopeless 1 - several days 1 - several days 0 - not at all 0 - not at all   Trouble falling or staying asleep, or sleeping too much 2 - more than half the days 1 - several days     Feeling tired or having little energy 1 - several days 2 - more than half the days     Poor appetite or overeating 1 - several days 0 - not at all     Feeling bad about yourself - or that you are a failure or have let yourself or your family down 1 - several days      Trouble concentrating on things, such as reading the newspaper or watching television 2 - more than half the days 2 - more than half the days     Moving or speaking so slowly that other people could have noticed. Or the opposite - being so fidgety or restless that you have been moving around a lot more than usual 1 - several days 1 - several days     Thoughts that you would be better off dead, or of hurting yourself in some way 0 - not at all 0 - not at all     PHQ-2 Total Score 2 2 0 0   PHQ-9 Total Score 10          Interpretation of PHQ-9 Total Score   Score Severity   1-4 No Depression   5-9 Mild Depression   10-14 Moderate Depression   15-19 Moderately Severe Depression   20-27 Severe Depression        ROS:  Gen: no fevers/chills, no changes in weight  Eyes: no changes in vision  ENT: no sore throat, no hearing loss, no bloody nose  Pulm: no sob, no cough  CV: no chest pain, no palpitations  GI: no nausea/vomiting, no diarrhea  : no dysuria  MSk: no myalgias  Skin: no rash  Neuro: no headaches, no numbness/tingling  Heme/Lymph: no easy bruising    Current Medications and Prescriptions Ordered in Epic[1]      Objective:  "    Exam:  /70 (BP Location: Left arm, Patient Position: Sitting, BP Cuff Size: Adult)   Pulse 91   Temp 36.4 °C (97.6 °F) (Temporal)   Resp 14   Ht 1.753 m (5' 9\")   Wt 118 kg (261 lb 3.9 oz)   SpO2 96%   BMI 38.58 kg/m²  Body mass index is 38.58 kg/m².    Gen: Alert and oriented, No apparent distress.  Neck: Neck is supple without lymphadenopathy.  Lungs: Normal effort, CTA bilaterally, no wheezes, rhonchi, or rales  CV: Regular rate and rhythm. No murmurs, rubs, or gallops.  Ext: No clubbing, cyanosis, edema.      Assessment & Plan:     30 y.o. male with the following -     1. Anxiety and depression  escitalopram (LEXAPRO) 10 MG Tab    Referral to Behavioral Health      2. Lipid screening  CBC WITH DIFFERENTIAL    Comp Metabolic Panel    Lipid Profile      3. Screening for thyroid disorder  TSH WITH REFLEX TO FT4      4. Diabetes mellitus screening  HEMOGLOBIN A1C      5. Encounter for vitamin deficiency screening  IRON/TOTAL IRON BIND    VIT B12,  FOLIC ACID    VITAMIN D,25 HYDROXY (DEFICIENCY)    MAGNESIUM          Assessment & Plan  Anxiety and depression  Propranolol effective for physiological symptoms, not emotional/cognitive effects.  Treatment plan: Continue propranolol as needed. Referral for therapy initiated.  -Zoloft 25 mg for a month with no significant benefit, increased somnolence, and gastric reflux.  Treatment plan: Discontinue Zoloft, initiate Lexapro 10 mg once daily. Update [facility] paperwork, tentative return to work 07/07/2025. Consider Celexa or Effexor if no improvement and/or referral to psychiatry    Health Maintenance  Ordered lab tests: CBC, comprehensive metabolic panel, lipid profile, thyroid function, blood glucose, iron, vitamin D, magnesium, B12, folic acid.  Treatment plan: Fast for at least 8 hours before tests.    Follow-up: next scheduled visit.      I spent a total of 15 minutes with record review, exam, communication with the patient, communication with other " providers, and documentation of this encounter.      No follow-ups on file.    Please note that this dictation was created using voice recognition software. I have made every reasonable attempt to correct obvious errors, but there may be errors of grammar and possibly content that I did not discover before finalizing the note.             [1]   Current Outpatient Medications Ordered in Epic   Medication Sig Dispense Refill    sertraline (ZOLOFT) 25 MG tablet Take 1 Tablet by mouth every day. 90 Tablet 0    propranolol (INDERAL) 10 MG Tab Take 1 Tablet by mouth 3 times a day. 90 Tablet 0    SIMETHICONE PO Take  by mouth. DO NOT TAKE DAY OF SURGERY      ibuprofen (MOTRIN) 200 MG Tab Take 400 mg by mouth 1 time a day as needed for Mild Pain. MEDICATION INSTRUCTIONS FOR SURGERY/PROCEDURE SCHEDULED FOR 8/28/24   DO NOT TAKE 5 DAYS PRIOR TO SURGERY, MAY TAKE TYLENOL      albuterol 108 (90 Base) MCG/ACT Aero Soln inhalation aerosol Inhale 2 Puffs every 6 hours as needed for Shortness of Breath. 8.5 g 0    senna-docusate (PERICOLACE OR SENOKOT S) 8.6-50 MG Tab Take 1 Tablet by mouth every day. Take as directed on the bottle. Take while on narcotics to avoid constipation. (Patient not taking: Reported on 8/12/2024) 30 Tablet 1    dicyclomine (BENTYL) 10 MG Cap Take 1 Capsule by mouth 4 Times a Day,Before Meals and at Bedtime. (Patient not taking: Reported on 4/25/2025) 120 Capsule 0    HYDROcodone-acetaminophen (NORCO) 5-325 MG Tab per tablet Take 1 Tablet by mouth 3 times a day as needed. Indications: Pain (Patient not taking: Reported on 5/30/2025)      bismuth subsalicylate (PEPTO-BISMOL) 262 MG/15ML Suspension Take 15 mL by mouth 2 times a day as needed. DO NOT TAKE DAY OF SURGERY (Patient not taking: Reported on 5/30/2025)      omeprazole (PRILOSEC) 40 MG delayed-release capsule Take 1 Capsule by mouth every day. (Patient taking differently: Take 40 mg by mouth every day. MEDICATION INSTRUCTIONS FOR SURGERY/PROCEDURE  SCHEDULED FOR 8/28/24   OK TO CONTINUE TAKING PRIOR TO SURGERY AND DAY OF SURGERY) 30 Capsule 3    sucralfate (CARAFATE) 1 GM/10ML Suspension Take 10 mL by mouth every 6 hours as needed (abdominal pain). (Patient not taking: Reported on 8/12/2024) 420 mL 0    ondansetron (ZOFRAN) 4 MG Tab tablet Take 1 Tablet by mouth every four hours as needed for Nausea/Vomiting. (Patient not taking: Reported on 8/12/2024) 15 Tablet 0     No current Epic-ordered facility-administered medications on file.

## 2025-05-31 ENCOUNTER — PATIENT MESSAGE (OUTPATIENT)
Dept: MEDICAL GROUP | Facility: LAB | Age: 30
End: 2025-05-31
Payer: COMMERCIAL

## 2025-06-09 NOTE — Clinical Note
REFERRAL APPROVAL NOTICE         Sent on June 9, 2025                   Jayden Nielsen  [phone]  [place] [address]                   Dear Mr. Nielsen,    After a careful review of the medical information and benefit coverage, [facility] has processed your referral. See below for additional details.    If applicable, you must be actively enrolled with your insurance for coverage of the authorized service. If you have any questions regarding your coverage, please contact your insurance directly.    REFERRAL INFORMATION   Referral #:  [id number]  Referred-To Provider    Referred-By Provider:  Behavioral Health    Susan Obando P.A.-C.   [facility]      [address]  [place] [address]  [phone] [address]  [place] [address]  [phone]    Referral Start Date:  05/30/2025  Referral End Date:   05/30/2026             SCHEDULING  If you do not already have an appointment, please call [phone] to make an appointment.     MORE INFORMATION  If you do not already have a [employer] account, sign up at: [email or web address]  You can access your medical information, make appointments, see lab results, billing information, and more.  If you have questions regarding this referral, please contact  the [facility] Referrals department at:             [phone]. Monday - Friday 8:00AM - 5:00PM.     Sincerely,    [facility]

## 2025-06-13 ENCOUNTER — HOSPITAL ENCOUNTER (OUTPATIENT)
Dept: LAB | Facility: MEDICAL CENTER | Age: 30
End: 2025-06-13
Attending: PHYSICIAN ASSISTANT
Payer: COMMERCIAL

## 2025-06-13 DIAGNOSIS — Z13.220 LIPID SCREENING: ICD-10-CM

## 2025-06-13 DIAGNOSIS — Z13.1 DIABETES MELLITUS SCREENING: ICD-10-CM

## 2025-06-13 DIAGNOSIS — Z13.29 SCREENING FOR THYROID DISORDER: ICD-10-CM

## 2025-06-13 DIAGNOSIS — Z13.21 ENCOUNTER FOR VITAMIN DEFICIENCY SCREENING: ICD-10-CM

## 2025-06-13 LAB
25(OH)D3 SERPL-MCNC: 8 NG/ML (ref 30–100)
ALBUMIN SERPL BCP-MCNC: 4.1 G/DL (ref 3.2–4.9)
ALBUMIN/GLOB SERPL: 1.3 G/DL
ALP SERPL-CCNC: 105 U/L (ref 30–99)
ALT SERPL-CCNC: 39 U/L (ref 2–50)
ANION GAP SERPL CALC-SCNC: 14 MMOL/L (ref 7–16)
AST SERPL-CCNC: 28 U/L (ref 12–45)
BASOPHILS # BLD AUTO: 0.7 % (ref 0–1.8)
BASOPHILS # BLD: 0.07 K/UL (ref 0–0.12)
BILIRUB SERPL-MCNC: 0.7 MG/DL (ref 0.1–1.5)
BUN SERPL-MCNC: 9 MG/DL (ref 8–22)
CALCIUM ALBUM COR SERPL-MCNC: 8.8 MG/DL (ref 8.5–10.5)
CALCIUM SERPL-MCNC: 8.9 MG/DL (ref 8.5–10.5)
CHLORIDE SERPL-SCNC: 105 MMOL/L (ref 96–112)
CHOLEST SERPL-MCNC: 167 MG/DL (ref 100–199)
CO2 SERPL-SCNC: 21 MMOL/L (ref 20–33)
CREAT SERPL-MCNC: 1.13 MG/DL (ref 0.5–1.4)
EOSINOPHIL # BLD AUTO: 0.3 K/UL (ref 0–0.51)
EOSINOPHIL NFR BLD: 3 % (ref 0–6.9)
ERYTHROCYTE [DISTWIDTH] IN BLOOD BY AUTOMATED COUNT: 42.3 FL (ref 35.9–50)
EST. AVERAGE GLUCOSE BLD GHB EST-MCNC: 105 MG/DL
FOLATE SERPL-MCNC: 15.6 NG/ML
GFR SERPLBLD CREATININE-BSD FMLA CKD-EPI: 89 ML/MIN/1.73 M 2
GLOBULIN SER CALC-MCNC: 3.1 G/DL (ref 1.9–3.5)
GLUCOSE SERPL-MCNC: 89 MG/DL (ref 65–99)
HBA1C MFR BLD: 5.3 % (ref 4–5.6)
HCT VFR BLD AUTO: 50.9 % (ref 42–52)
HDLC SERPL-MCNC: 50 MG/DL
HGB BLD-MCNC: 17.1 G/DL (ref 14–18)
IMM GRANULOCYTES # BLD AUTO: 0.03 K/UL (ref 0–0.11)
IMM GRANULOCYTES NFR BLD AUTO: 0.3 % (ref 0–0.9)
IRON SATN MFR SERPL: 19 % (ref 15–55)
IRON SERPL-MCNC: 62 UG/DL (ref 50–180)
LDLC SERPL CALC-MCNC: 101 MG/DL
LYMPHOCYTES # BLD AUTO: 3.11 K/UL (ref 1–4.8)
LYMPHOCYTES NFR BLD: 31.1 % (ref 22–41)
MAGNESIUM SERPL-MCNC: 2.1 MG/DL (ref 1.5–2.5)
MCH RBC QN AUTO: 30.1 PG (ref 27–33)
MCHC RBC AUTO-ENTMCNC: 33.6 G/DL (ref 32.3–36.5)
MCV RBC AUTO: 89.6 FL (ref 81.4–97.8)
MONOCYTES # BLD AUTO: 0.62 K/UL (ref 0–0.85)
MONOCYTES NFR BLD AUTO: 6.2 % (ref 0–13.4)
NEUTROPHILS # BLD AUTO: 5.88 K/UL (ref 1.82–7.42)
NEUTROPHILS NFR BLD: 58.7 % (ref 44–72)
NRBC # BLD AUTO: 0 K/UL
NRBC BLD-RTO: 0 /100 WBC (ref 0–0.2)
PLATELET # BLD AUTO: 384 K/UL (ref 164–446)
PMV BLD AUTO: 9.7 FL (ref 9–12.9)
POTASSIUM SERPL-SCNC: 4.1 MMOL/L (ref 3.6–5.5)
PROT SERPL-MCNC: 7.2 G/DL (ref 6–8.2)
RBC # BLD AUTO: 5.68 M/UL (ref 4.7–6.1)
SODIUM SERPL-SCNC: 140 MMOL/L (ref 135–145)
TIBC SERPL-MCNC: 335 UG/DL (ref 250–450)
TRIGL SERPL-MCNC: 82 MG/DL (ref 0–149)
TSH SERPL DL<=0.005 MIU/L-ACNC: 1.43 UIU/ML (ref 0.38–5.33)
UIBC SERPL-MCNC: 273 UG/DL (ref 110–370)
VIT B12 SERPL-MCNC: 904 PG/ML (ref 211–911)
WBC # BLD AUTO: 10 K/UL (ref 4.8–10.8)

## 2025-06-13 PROCEDURE — 84443 ASSAY THYROID STIM HORMONE: CPT

## 2025-06-13 PROCEDURE — 82306 VITAMIN D 25 HYDROXY: CPT

## 2025-06-13 PROCEDURE — 85025 COMPLETE CBC W/AUTO DIFF WBC: CPT

## 2025-06-13 PROCEDURE — 83550 IRON BINDING TEST: CPT

## 2025-06-13 PROCEDURE — 83540 ASSAY OF IRON: CPT

## 2025-06-13 PROCEDURE — 82607 VITAMIN B-12: CPT

## 2025-06-13 PROCEDURE — 82746 ASSAY OF FOLIC ACID SERUM: CPT

## 2025-06-13 PROCEDURE — 36415 COLL VENOUS BLD VENIPUNCTURE: CPT

## 2025-06-13 PROCEDURE — 83735 ASSAY OF MAGNESIUM: CPT

## 2025-06-13 PROCEDURE — 80061 LIPID PANEL: CPT

## 2025-06-13 PROCEDURE — 80053 COMPREHEN METABOLIC PANEL: CPT

## 2025-06-13 PROCEDURE — 83036 HEMOGLOBIN GLYCOSYLATED A1C: CPT

## 2025-06-16 ENCOUNTER — RESULTS FOLLOW-UP (OUTPATIENT)
Dept: MEDICAL GROUP | Facility: LAB | Age: 30
End: 2025-06-16
Payer: COMMERCIAL

## 2025-06-16 DIAGNOSIS — E55.9 VITAMIN D DEFICIENCY: Primary | ICD-10-CM

## 2025-06-16 RX ORDER — ERGOCALCIFEROL 1.25 MG/1
50000 CAPSULE ORAL
Qty: 12 CAPSULE | Refills: 3 | Status: SHIPPED | OUTPATIENT
Start: 2025-06-16

## 2025-06-16 RX ORDER — ERGOCALCIFEROL 1.25 MG/1
50000 CAPSULE ORAL
Qty: 12 CAPSULE | Refills: 3 | Status: CANCELLED | OUTPATIENT
Start: 2025-06-16

## 2025-06-25 ENCOUNTER — OFFICE VISIT (OUTPATIENT)
Dept: MEDICAL GROUP | Facility: LAB | Age: 30
End: 2025-06-25
Payer: COMMERCIAL

## 2025-06-25 VITALS
BODY MASS INDEX: 38.86 KG/M2 | RESPIRATION RATE: 16 BRPM | HEART RATE: 96 BPM | OXYGEN SATURATION: 94 % | DIASTOLIC BLOOD PRESSURE: 88 MMHG | WEIGHT: 262.35 LBS | SYSTOLIC BLOOD PRESSURE: 120 MMHG | TEMPERATURE: 97.6 F | HEIGHT: 69 IN

## 2025-06-25 DIAGNOSIS — F41.9 ANXIETY AND DEPRESSION: ICD-10-CM

## 2025-06-25 DIAGNOSIS — Z00.00 ENCOUNTER FOR PREVENTATIVE ADULT HEALTH CARE EXAMINATION: Primary | ICD-10-CM

## 2025-06-25 DIAGNOSIS — F32.A ANXIETY AND DEPRESSION: ICD-10-CM

## 2025-06-25 RX ORDER — ESCITALOPRAM OXALATE 10 MG/1
10 TABLET ORAL DAILY
Qty: 30 TABLET | Refills: 1 | Status: SHIPPED | OUTPATIENT
Start: 2025-06-25

## 2025-06-25 SDOH — HEALTH STABILITY: PHYSICAL HEALTH: ON AVERAGE, HOW MANY MINUTES DO YOU ENGAGE IN EXERCISE AT THIS LEVEL?: 30 MIN

## 2025-06-25 SDOH — ECONOMIC STABILITY: INCOME INSECURITY: HOW HARD IS IT FOR YOU TO PAY FOR THE VERY BASICS LIKE FOOD, HOUSING, MEDICAL CARE, AND HEATING?: NOT HARD AT ALL

## 2025-06-25 SDOH — ECONOMIC STABILITY: INCOME INSECURITY: IN THE LAST 12 MONTHS, WAS THERE A TIME WHEN YOU WERE NOT ABLE TO PAY THE MORTGAGE OR RENT ON TIME?: NO

## 2025-06-25 SDOH — ECONOMIC STABILITY: FOOD INSECURITY: WITHIN THE PAST 12 MONTHS, THE FOOD YOU BOUGHT JUST DIDN'T LAST AND YOU DIDN'T HAVE MONEY TO GET MORE.: NEVER TRUE

## 2025-06-25 SDOH — ECONOMIC STABILITY: FOOD INSECURITY: WITHIN THE PAST 12 MONTHS, YOU WORRIED THAT YOUR FOOD WOULD RUN OUT BEFORE YOU GOT MONEY TO BUY MORE.: NEVER TRUE

## 2025-06-25 SDOH — HEALTH STABILITY: PHYSICAL HEALTH: ON AVERAGE, HOW MANY DAYS PER WEEK DO YOU ENGAGE IN MODERATE TO STRENUOUS EXERCISE (LIKE A BRISK WALK)?: 3 DAYS

## 2025-06-25 ASSESSMENT — SOCIAL DETERMINANTS OF HEALTH (SDOH)
ARE YOU MARRIED, WIDOWED, DIVORCED, SEPARATED, NEVER MARRIED, OR LIVING WITH A PARTNER?: NEVER MARRIED
WITHIN THE PAST 12 MONTHS, YOU WORRIED THAT YOUR FOOD WOULD RUN OUT BEFORE YOU GOT THE MONEY TO BUY MORE: NEVER TRUE
HOW HARD IS IT FOR YOU TO PAY FOR THE VERY BASICS LIKE FOOD, HOUSING, MEDICAL CARE, AND HEATING?: NOT HARD AT ALL
HOW OFTEN DO YOU HAVE SIX OR MORE DRINKS ON ONE OCCASION: NEVER
HOW MANY DRINKS CONTAINING ALCOHOL DO YOU HAVE ON A TYPICAL DAY WHEN YOU ARE DRINKING: PATIENT DOES NOT DRINK
DO YOU BELONG TO ANY CLUBS OR ORGANIZATIONS SUCH AS CHURCH GROUPS UNIONS, FRATERNAL OR ATHLETIC GROUPS, OR SCHOOL GROUPS?: NO
HOW OFTEN DO YOU GET TOGETHER WITH FRIENDS OR RELATIVES?: ONCE A WEEK
HOW OFTEN DO YOU ATTEND CHURCH OR RELIGIOUS SERVICES?: NEVER
IN A TYPICAL WEEK, HOW MANY TIMES DO YOU TALK ON THE PHONE WITH FAMILY, FRIENDS, OR NEIGHBORS?: TWICE A WEEK
HOW OFTEN DO YOU HAVE A DRINK CONTAINING ALCOHOL: NEVER
DO YOU BELONG TO ANY CLUBS OR ORGANIZATIONS SUCH AS CHURCH GROUPS UNIONS, FRATERNAL OR ATHLETIC GROUPS, OR SCHOOL GROUPS?: NO
HOW OFTEN DO YOU ATTEND CHURCH OR RELIGIOUS SERVICES?: NEVER
IN THE PAST 12 MONTHS, HAS THE ELECTRIC, GAS, OIL, OR WATER COMPANY THREATENED TO SHUT OFF SERVICE IN YOUR HOME?: NO
HOW OFTEN DO YOU ATTENT MEETINGS OF THE CLUB OR ORGANIZATION YOU BELONG TO?: NEVER
HOW OFTEN DO YOU ATTENT MEETINGS OF THE CLUB OR ORGANIZATION YOU BELONG TO?: NEVER
HOW OFTEN DO YOU GET TOGETHER WITH FRIENDS OR RELATIVES?: ONCE A WEEK
ARE YOU MARRIED, WIDOWED, DIVORCED, SEPARATED, NEVER MARRIED, OR LIVING WITH A PARTNER?: NEVER MARRIED
IN A TYPICAL WEEK, HOW MANY TIMES DO YOU TALK ON THE PHONE WITH FAMILY, FRIENDS, OR NEIGHBORS?: TWICE A WEEK

## 2025-06-25 ASSESSMENT — LIFESTYLE VARIABLES
HOW OFTEN DO YOU HAVE A DRINK CONTAINING ALCOHOL: NEVER
HOW MANY STANDARD DRINKS CONTAINING ALCOHOL DO YOU HAVE ON A TYPICAL DAY: PATIENT DOES NOT DRINK
SKIP TO QUESTIONS 9-10: 1
AUDIT-C TOTAL SCORE: 0
HOW OFTEN DO YOU HAVE SIX OR MORE DRINKS ON ONE OCCASION: NEVER

## 2025-06-25 ASSESSMENT — FIBROSIS 4 INDEX: FIB4 SCORE: 0.35

## 2025-06-25 NOTE — ASSESSMENT & PLAN NOTE
Well-controlled on his current management.  - Continue escitalopram (Lexapro) 10 mg tablet daily  - Continue propranolol 3 times daily as needed  - Will follow-up with him in 3 months for med check  Orders:    escitalopram (LEXAPRO) 10 MG Tab; Take 1 Tablet by mouth every day.

## 2025-06-25 NOTE — ASSESSMENT & PLAN NOTE
Labs reviewed today: CBC with differential, CMP, lipid profile, TSH with reflex to free T4, hemoglobin A1c, iron panel, vitamin D, magnesium, vitamin B12, folate, and GFR    -Patient is taking Ergocalciferol 50,000 unit capsule every 7 days, and reports she has been getting outside more to increase his vitamin D

## 2025-06-25 NOTE — PROGRESS NOTES
Subjective  Jayden Nielsen is a 30 y.o. male who presents today to establish care.     Anxiety and Depression  - Experience increased benefit since adding Lexapro to his medication regimen.  - Continues to use propranolol as needed, up to 3 times daily.  - Propranolol effective for physical anxiety symptoms  - Lexapro has helped address emotional and cognitive aspects, particularly intrusive thoughts disrupting sleep.  - Feeling more prepared to return to work on July 7, 2025.  Feels like his anxiety and stress is well-managed on current medication regimen.  - He would like to continue his current medication regimen as is, as he is not experiencing adverse side effects      History of Chronic Sinusitis  -recurrent URIs  -not currently using antihistamines, nasal steroid sprays  -does use saline nasal spray      Health maintenance  Oral hygiene: Dental cleanings every 6 months, and sees a dentist every 1-2 years  Eye care: Sees an optometrist yearly.  No changes in vision.  Sleep: Sleeping well, did not report trouble falling asleep or staying asleep.  Patient reports it used to take him 2 to 3 hours to fall asleep.  Since continuing his current medication regimen of Lexapro and propranolol as needed, he is now able to fall asleep within 30 minutes.   Diet/exercise: Balanced diet (texture issues with vegetables - eats fruits, grains, meats, occ dairy), feeling more mobile.  Family attributes increasing weight before adding Lexapro.  No he feels like he has been getting outside more and has been more active.   Substance Use: denies  Tobacco Use/counseling: denies  BM: Daily, soft.  Not straining  : Denied urgency, frequency, nocturia, intermittency, hesitancy, dysuria, and hematuria      Family History   Problem Relation Age of Onset    Cancer Mother         ?breast       Social History     Socioeconomic History    Marital status: Single     Spouse name: Not on file    Number of children: Not on file    Years of  education: Not on file    Highest education level: Bachelor's degree (e.g., BA, AB, BS)   Occupational History    Not on file   Tobacco Use    Smoking status: Never    Smokeless tobacco: Never   Vaping Use    Vaping status: Never Used   Substance and Sexual Activity    Alcohol use: Not Currently     Comment: socially, maybe 2 drinks a year    Drug use: Never    Sexual activity: Not Currently     Partners: Male     Birth control/protection: Abstinence   Other Topics Concern    Not on file   Social History Narrative    Not on file     Social Drivers of Health     Financial Resource Strain: Low Risk  (6/25/2025)    Overall Financial Resource Strain (CARDIA)     Difficulty of Paying Living Expenses: Not hard at all   Food Insecurity: No Food Insecurity (6/25/2025)    Hunger Vital Sign     Worried About Running Out of Food in the Last Year: Never true     Ran Out of Food in the Last Year: Never true   Transportation Needs: No Transportation Needs (6/25/2025)    PRAPARE - Transportation     Lack of Transportation (Medical): No     Lack of Transportation (Non-Medical): No   Physical Activity: Insufficiently Active (6/25/2025)    Exercise Vital Sign     Days of Exercise per Week: 3 days     Minutes of Exercise per Session: 30 min   Stress: Stress Concern Present (6/25/2025)    [employer] - Occupational Stress Questionnaire     Feeling of Stress : To some extent   Social Connections: Socially Isolated (6/25/2025)    Social Connection and Isolation Panel [NHANES]     Frequency of Communication with Friends and Family: Twice a week     Frequency of Social Gatherings with Friends and Family: Once a week     Attends Anabaptism Services: Never     Active Member of Clubs or Organizations: No     Attends Club or Organization Meetings: Never     Marital Status: Never    Intimate Partner Violence: Not At Risk (5/25/2024)    Humiliation, Afraid, Rape, and Kick questionnaire     Fear of Current or  "Ex-Partner: No     Emotionally Abused: No     Physically Abused: No     Sexually Abused: No   Housing Stability: Low Risk  (6/25/2025)    Housing Stability Vital Sign     Unable to Pay for Housing in the Last Year: No     Number of Times Moved in the Last Year: 0     Homeless in the Last Year: No       Past Surgical History[1]    Past Medical History[2]    Current Medications[3]    Allergies[4]    Objective  /88 (BP Location: Left arm, Patient Position: Sitting, BP Cuff Size: Large adult)   Pulse 96   Temp 36.4 °C (97.6 °F) (Temporal)   Resp 16   Ht 1.753 m (5' 9\")   Wt 119 kg (262 lb 5.6 oz)   SpO2 94%   Physical Exam  Constitutional:       Appearance: Normal appearance.   HENT:      Right Ear: Tympanic membrane, ear canal and external ear normal. There is no impacted cerumen.      Left Ear: Tympanic membrane, ear canal and external ear normal. There is no impacted cerumen.      Mouth/Throat:      Mouth: Mucous membranes are moist.      Pharynx: Oropharynx is clear. No oropharyngeal exudate or posterior oropharyngeal erythema.   Eyes:      General:         Right eye: No discharge.         Left eye: No discharge.      Extraocular Movements: Extraocular movements intact.      Pupils: Pupils are equal, round, and reactive to light.   Cardiovascular:      Rate and Rhythm: Normal rate and regular rhythm.   Pulmonary:      Effort: Pulmonary effort is normal.      Breath sounds: Normal breath sounds.   Musculoskeletal:      Cervical back: Normal range of motion.      Right lower leg: No edema.      Left lower leg: No edema.   Skin:     General: Skin is warm and dry.   Neurological:      Mental Status: He is alert.   Psychiatric:         Mood and Affect: Mood normal.         Behavior: Behavior normal.         Thought Content: Thought content normal.         Labs:   Component      Latest Ref Rn 6/13/2025   WBC      4.8 - 10.8 K/uL 10.0    RBC      4.70 - 6.10 M/uL 5.68    Hemoglobin      14.0 - 18.0 g/dL 17.1  "   Hematocrit      42.0 - 52.0 % 50.9    MCV      81.4 - 97.8 fL 89.6    MCH      27.0 - 33.0 pg 30.1    MCHC      32.3 - 36.5 g/dL 33.6    RDW      35.9 - 50.0 fL 42.3    Platelet Count      164 - 446 K/uL 384    MPV      9.0 - 12.9 fL 9.7    Neutrophils-Polys      44.00 - 72.00 % 58.70    Lymphocytes      22.00 - 41.00 % 31.10    Monocytes      0.00 - 13.40 % 6.20    Eosinophils      0.00 - 6.90 % 3.00    Basophils      0.00 - 1.80 % 0.70    Immature Granulocytes      0.00 - 0.90 % 0.30    Nucleated RBC      0.00 - 0.20 /100 WBC 0.00    Neutrophils (Absolute)      1.82 - 7.42 K/uL 5.88    Lymphs (Absolute)      1.00 - 4.80 K/uL 3.11    Monos (Absolute)      0.00 - 0.85 K/uL 0.62    Eos (Absolute)      0.00 - 0.51 K/uL 0.30    Baso (Absolute)      0.00 - 0.12 K/uL 0.07    Immature Granulocytes (abs)      0.00 - 0.11 K/uL 0.03    NRBC (Absolute)      K/uL 0.00    Sodium      135 - 145 mmol/L 140    Potassium      3.6 - 5.5 mmol/L 4.1    Chloride      96 - 112 mmol/L 105    Co2      20 - 33 mmol/L 21    Anion Gap      7.0 - 16.0  14.0    Glucose      65 - 99 mg/dL 89    Bun      8 - 22 mg/dL 9    Creatinine      0.50 - 1.40 mg/dL 1.13    Calcium      8.5 - 10.5 mg/dL 8.9    Correct Calcium      8.5 - 10.5 mg/dL 8.8    AST(SGOT)      12 - 45 U/L 28    ALT(SGPT)      2 - 50 U/L 39    Alkaline Phosphatase      30 - 99 U/L 105 (H)    Total Bilirubin      0.1 - 1.5 mg/dL 0.7    Albumin      3.2 - 4.9 g/dL 4.1    Total Protein      6.0 - 8.2 g/dL 7.2    Globulin      1.9 - 3.5 g/dL 3.1    A-G Ratio      g/dL 1.3    Cholesterol,Tot      100 - 199 mg/dL 167    Triglycerides      0 - 149 mg/dL 82    HDL      >=40 mg/dL 50    LDL      <100 mg/dL 101 (H)    Iron      50 - 180 ug/dL 62    Total Iron Binding      250 - 450 ug/dL 335    Unsat Iron Binding      110 - 370 ug/dL 273    % Saturation      15 - 55 % 19    Glycohemoglobin      4.0 - 5.6 % 5.3    Estim. Avg Glu      mg/dL 105    TSH      0.380 - 5.330 uIU/mL 1.430     25-Hydroxy   Vitamin D 25      30 - 100 ng/mL 8 (L)    Magnesium      1.5 - 2.5 mg/dL 2.1    Vitamin B12 -True Cobalamin      211 - 911 pg/mL 904    Folate -Folic Acid      >4.0 ng/mL 15.6    GFR (CKD-EPI)      >60 mL/min/1.73 m 2 89       Legend:  (H) High  (L) Low    Imaging: None    Assessment & Plan  Encounter for preventative adult health care examination  Labs reviewed today: CBC with differential, CMP, lipid profile, TSH with reflex to free T4, hemoglobin A1c, iron panel, vitamin D, magnesium, vitamin B12, folate, and GFR    -Patient is taking Ergocalciferol 50,000 unit capsule every 7 days, and reports she has been getting outside more to increase his vitamin D         Anxiety and depression  Well-controlled on his current management.  - Continue escitalopram (Lexapro) 10 mg tablet daily  - Continue propranolol 3 times daily as needed  - Will follow-up with him in 3 months for med check  Orders:    escitalopram (LEXAPRO) 10 MG Tab; Take 1 Tablet by mouth every day.          1. Encounter for preventative adult health care examination    2. Anxiety and depression  - escitalopram (LEXAPRO) 10 MG Tab; Take 1 Tablet by mouth every day.  Dispense: 30 Tablet; Refill: 1             [1]   Past Surgical History:  Procedure Laterality Date    ME ERCP,DIAGNOSTIC N/A 8/28/2024    Procedure: RECALL FOR ENDOSCOPIC RETROGRADE CHOLANGIOPANCREATOGRAPHY FOR BILIARY STENT EXCHANGE;  Surgeon: Robbie Lacy M.D.;  Location: [facility];  Service: EUS    ME ERCP,DIAGNOSTIC N/A 05/27/2024    Procedure: ERCP (ENDOSCOPIC RETROGRADE CHOLANGIOPANCREATOGRAPHY);  Surgeon: Robbie Lacy M.D.;  Location: [facility];  Service: Gastroenterology    SPHINCTEROTOMY N/A 05/27/2024    Procedure: SPHINCTEROTOMY;  Surgeon: Robbie Lacy M.D.;  Location: [facility];  Service: Gastroenterology    BILIARY STENT PLACEMENT N/A 05/27/2024    Procedure: INSERTION, STENT, BILE DUCT;  Surgeon: Robbie RAMIREZ  CHIQUITA Lacy;  Location: [facility]ERY [facility];  Service: Gastroenterology    ADRIEL BY LAPAROSCOPY N/A 05/25/2024    Procedure: CHOLECYSTECTOMY, LAPAROSCOPIC;  Surgeon: Gerry Barry M.D.;  Location: SURGERY [facility];  Service: General   [2]   Past Medical History:  Diagnosis Date    Asthma     Kidney stone     Kidney stone    [3]   Current Outpatient Medications   Medication Sig Dispense Refill    escitalopram (LEXAPRO) 10 MG Tab Take 1 Tablet by mouth every day. 30 Tablet 1    ergocalciferol (DRISDOL) 77567 UNIT capsule Take 1 Capsule by mouth every 7 days. 12 Capsule 3    propranolol (INDERAL) 10 MG Tab Take 1 Tablet by mouth 3 times a day. 90 Tablet 0    SIMETHICONE PO Take  by mouth. DO NOT TAKE DAY OF SURGERY      ibuprofen (MOTRIN) 200 MG Tab Take 400 mg by mouth 1 time a day as needed for Mild Pain. MEDICATION INSTRUCTIONS FOR SURGERY/PROCEDURE SCHEDULED FOR 8/28/24   DO NOT TAKE 5 DAYS PRIOR TO SURGERY, MAY TAKE TYLENOL      albuterol 108 (90 Base) MCG/ACT Aero Soln inhalation aerosol Inhale 2 Puffs every 6 hours as needed for Shortness of Breath. 8.5 g 0     No current facility-administered medications for this visit.   [4] No Known Allergies

## 2025-08-29 DIAGNOSIS — F41.9 ANXIETY AND DEPRESSION: ICD-10-CM

## 2025-08-29 DIAGNOSIS — F32.A ANXIETY AND DEPRESSION: ICD-10-CM

## 2025-08-29 RX ORDER — ESCITALOPRAM OXALATE 10 MG/1
10 TABLET ORAL DAILY
Qty: 30 TABLET | Refills: 1 | Status: SHIPPED | OUTPATIENT
Start: 2025-08-29

## (undated) DEVICE — TOWEL STOP TIMEOUT SAFETY FLAG (40EA/CA)

## (undated) DEVICE — GOWN WARMING STANDARD FLEX - (30/CA)

## (undated) DEVICE — EXTRACTOR PRO XL 9-12 MM ABOVE

## (undated) DEVICE — DRAIN J-P FLAT 7MM X 20CM - (10/CA)

## (undated) DEVICE — SENSOR OXIMETER ADULT SPO2 RD SET (20EA/BX)

## (undated) DEVICE — WATER IRRIGATION STERILE 1000ML (12EA/CA)

## (undated) DEVICE — SYRINGE SAFETY 5 ML 18 GA X 1-1/2 BLUNT LL (100/BX 4BX/CA)

## (undated) DEVICE — GUIDEWIRE .025X450CM JAGWIRE REVOLUTION (2EA/BX)

## (undated) DEVICE — INTRODUCER STENT NAVIFLEX 10FR

## (undated) DEVICE — CLOSURE SKIN STRIP 1/2 X 4 IN - (STERI STRIP) (50/BX 4BX/CA)

## (undated) DEVICE — DERMABOND ADVANCED - (12EA/BX)

## (undated) DEVICE — SUCTION INSTRUMENT YANKAUER BULBOUS TIP W/O VENT (50EA/CA)

## (undated) DEVICE — APPLICATOR ENDOSCOPIC SURGICEL (5EA/BX)

## (undated) DEVICE — DRAIN J-VAC 10MM FLAT - (10/CA)

## (undated) DEVICE — SODIUM CHL IRRIGATION 0.9% 1000ML (12EA/CA)

## (undated) DEVICE — COVER ENDOSCOPE DISTAL SINGLE USE (20EA/BX)

## (undated) DEVICE — NEPTUNE 4 PORT MANIFOLD - (20/PK)

## (undated) DEVICE — SYRINGE 10 ML CONTROL LL (25EA/BX 4BX/CA)

## (undated) DEVICE — SYRINGE SAFETY 3 ML 18 GA X 1 1/2 BLUNT LL (100/BX 8BX/CA)

## (undated) DEVICE — ELECTRODE DUAL RETURN W/ CORD - (50/PK)

## (undated) DEVICE — TUBE SUCTION YANKAUER 1/4 X 6FT (50EA/CA)"

## (undated) DEVICE — SUTURE GENERAL

## (undated) DEVICE — SYRINGE SAFETY 10 ML 18 GA X 1 1/2 BLUNT LL (100/BX 4BX/CA)

## (undated) DEVICE — HEMOSTAT ABSORBABLE POWDER SURGICEL 3G (5EA/BX)

## (undated) DEVICE — KIT  I.V. START (100EA/CA)

## (undated) DEVICE — CANISTER SUCTION 3000ML MECHANICAL FILTER AUTO SHUTOFF MEDI-VAC NONSTERILE LF DISP  (40EA/CA)

## (undated) DEVICE — FORCEP RADIAL JAW 4 STANDARD CAPACITY W/NEEDLE 240CM (40EA/BX)

## (undated) DEVICE — PACK LAP CHOLE OR - (2EA/CA)

## (undated) DEVICE — TUBE E-T HI-LO CUFF 8.0MM (10EA/PK)

## (undated) DEVICE — SUTURE 4-0 VICRYL PLUS FS-2 - 27 INCH (36/BX)

## (undated) DEVICE — GOWN SURGEONS LARGE - (32/CA)

## (undated) DEVICE — SYRINGE DISP. 60 CC LL - (30/BX, 12BX/CA)**WHEN THESE ARE GONE ORDER #500206**

## (undated) DEVICE — DUODENOSCOPE FLEXIBLE EXALT MODEL-D SINGLE USE (2EA/BX)

## (undated) DEVICE — RESERVOIR SUCTION 100 CC - SILICONE (20EA/CA)

## (undated) DEVICE — BLOCK BITE ENDOSCOPIC 2809 - (100/BX) INTERMEDIATE

## (undated) DEVICE — SET LEADWIRE 5 LEAD BEDSIDE DISPOSABLE ECG (1SET OF 5/EA)

## (undated) DEVICE — BAG RETRIEVAL 10ML (10EA/BX)

## (undated) DEVICE — SPONGE GAUZE NON-STERILE 4X4 - (2000/CA 10PK/CA)

## (undated) DEVICE — KIT CUSTOM PROCEDURE SINGLE FOR ENDO  (15/CA)

## (undated) DEVICE — SYRINGE 30 ML LS (56/BX)

## (undated) DEVICE — SET EXTENSION WITH 2 PORTS (48EA/CA) ***PART #2C8610 IS A SUBSTITUTE*****

## (undated) DEVICE — CLIP MED LG INTNL HRZN TI ESCP - (20/BX)

## (undated) DEVICE — MASK OXYGEN VNYL ADLT MED CONC WITH 7 FOOT TUBING  - (50EA/CA)

## (undated) DEVICE — SET SUCTION/IRRIGATION WITH DISPOSABLE TIP (6/CA )PART #0250-070-520 IS A SUB

## (undated) DEVICE — SYRINGE 12 CC LUER TIP - (80/BX) OBSOLETE ITEM

## (undated) DEVICE — GLOVE BIOGEL SZ 7.5 SURGICAL PF LTX - (50PR/BX 4BX/CA)

## (undated) DEVICE — DRESSING TRANSPARENT FILM TEGADERM 2.375 X 2.75"  (100EA/BX)"

## (undated) DEVICE — SUTURE 0 COATED VICRYL 6-18IN - (12PK/BX)

## (undated) DEVICE — TUBING CLEARLINK DUO-VENT - C-FLO (48EA/CA)

## (undated) DEVICE — BLOCK BITE MAXI DENTAL RETENTION RIM (100EA/BX)

## (undated) DEVICE — ADHESIVE MASTISOL - (48/BX)

## (undated) DEVICE — CHLORAPREP 26 ML APPLICATOR - ORANGE TINT(25/CA)

## (undated) DEVICE — CANISTER SUCTION RIGID RED 1500CC (40EA/CA)

## (undated) DEVICE — GUIDE JAGWIRE 035 STRAIGHT (2EA/BX)

## (undated) DEVICE — SPHINCTEROTOME CLEVER CUT

## (undated) DEVICE — TROCAR 5X100 BLADED Z-THREAD - KII (6/BX)

## (undated) DEVICE — SUTURE 2-0 VICRYL PLUS CT-2 - 27 INCH (36/BX)

## (undated) DEVICE — CANNULA W/SEAL 5X100 Z-THRE - ADED KII (12/BX)

## (undated) DEVICE — LACTATED RINGERS INJ 1000 ML - (14EA/CA 60CA/PF)

## (undated) DEVICE — TROCAR Z THREAD 11 X 100 - BLADED (6/BX)

## (undated) DEVICE — ELECTRODE 850 FOAM ADHESIVE - HYDROGEL RADIOTRNSPRNT (50/PK)

## (undated) DEVICE — COVER LIGHT HANDLE FLEXIBLE - SOFT (2EA/PK 80PK/CA)

## (undated) DEVICE — FILM CASSETTE ENDO

## (undated) DEVICE — SPONGE GAUZESTER. 2X2 4-PL - (2/PK 50PK/BX 30BX/CS)

## (undated) DEVICE — SUTURE2-0 27IN VCRL ANTI VIOL (36PK/BX)

## (undated) DEVICE — TUBE CONNECTING SUCTION - CLEAR PLASTIC STERILE 72 IN (50EA/CA)

## (undated) DEVICE — SLEEVE, VASO, THIGH, MED

## (undated) DEVICE — KIT ANESTHESIA W/CIRCUIT & 3/LT BAG W/FILTER (20EA/CA)

## (undated) DEVICE — VESSEL DIVIDER SEAL LAP LIGASURE 37CM (6EA/BX)

## (undated) DEVICE — MASK WITH FACE SHIELD (25/BX 4BX/CA)

## (undated) DEVICE — COVER LIGHT HANDLE ALC PLUS DISP (18EA/BX)

## (undated) DEVICE — KIT CUSTOM PROCEDURE SINGLE FOR ENDO (15/CA)